# Patient Record
Sex: MALE | Race: WHITE | Employment: OTHER | URBAN - METROPOLITAN AREA
[De-identification: names, ages, dates, MRNs, and addresses within clinical notes are randomized per-mention and may not be internally consistent; named-entity substitution may affect disease eponyms.]

---

## 2017-02-15 ENCOUNTER — OFFICE VISIT (OUTPATIENT)
Dept: GASTROENTEROLOGY | Facility: CLINIC | Age: 67
End: 2017-02-15

## 2017-02-15 VITALS
HEIGHT: 68 IN | DIASTOLIC BLOOD PRESSURE: 75 MMHG | HEART RATE: 86 BPM | BODY MASS INDEX: 24.8 KG/M2 | SYSTOLIC BLOOD PRESSURE: 120 MMHG | WEIGHT: 163.63 LBS

## 2017-02-15 DIAGNOSIS — R10.9 RIGHT FLANK PAIN: Primary | ICD-10-CM

## 2017-02-15 PROCEDURE — G0463 HOSPITAL OUTPT CLINIC VISIT: HCPCS | Performed by: INTERNAL MEDICINE

## 2017-02-15 PROCEDURE — 99203 OFFICE O/P NEW LOW 30 MIN: CPT | Performed by: INTERNAL MEDICINE

## 2017-02-16 ENCOUNTER — OFFICE VISIT (OUTPATIENT)
Dept: INTERNAL MEDICINE CLINIC | Facility: CLINIC | Age: 67
End: 2017-02-16

## 2017-02-16 ENCOUNTER — TELEPHONE (OUTPATIENT)
Dept: INTERNAL MEDICINE CLINIC | Facility: CLINIC | Age: 67
End: 2017-02-16

## 2017-02-16 VITALS
DIASTOLIC BLOOD PRESSURE: 78 MMHG | HEIGHT: 68 IN | BODY MASS INDEX: 24.71 KG/M2 | HEART RATE: 78 BPM | WEIGHT: 163 LBS | SYSTOLIC BLOOD PRESSURE: 122 MMHG | RESPIRATION RATE: 16 BRPM

## 2017-02-16 DIAGNOSIS — R07.2 PRECORDIAL PAIN: Primary | ICD-10-CM

## 2017-02-16 PROCEDURE — G0463 HOSPITAL OUTPT CLINIC VISIT: HCPCS | Performed by: INTERNAL MEDICINE

## 2017-02-16 PROCEDURE — 99214 OFFICE O/P EST MOD 30 MIN: CPT | Performed by: INTERNAL MEDICINE

## 2017-02-16 NOTE — TELEPHONE ENCOUNTER
Patient was seen 2/16/17 - dropped ff copies of his cardiology report at the [facility]  2./16/17 - placed copies in the [facility] SYSTEM OF THE [facility] nurse station in box of  Dr Kristal Campo.

## 2017-02-17 NOTE — PROGRESS NOTES
HPI:    Patient ID: Mark Gamez is a 73year old male. HPI  The patient is seen at the request of Dr. Tina Fregoso.   He describes a 2 year history of right upper quadrant/right flank pain along with some epigastric discomfort that occurs after eating disease. His mother had dementia. 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       Review of Systems  See above    Wt Readings from Last 12 Encounters:  02/16/17 : 163 lb (73.936 kg)  02/15/17 : 163 lb 9.6 oz (74.208 kg)  12/19/16 : 162 lb (73.483 kg)  12/08 CREATININE      0.50-1.50 mg/dL 0.96   CALCIUM      8.5-10.5 mg/dL 10.0   ALT (SGPT)      17-63 U/L 23   AST (SGOT)      15-41 U/L 20   ALKALINE PHOSPHATASE       U/L 61   Total Bilirubin      0.3-1.2 mg/dL 0.7   TOTAL PROTEIN      5.9-8.4 g/dL 6.2 intra- or extrahepatic biliary ductal dilatation. SPLEEN:          No enlargement or focal lesion.     STOMACH:      There is thickening of the distal esophagus, most compatible with hiatal hernia. No gastric obstruction.   PANCREAS:     No lesion, fluid c hiatal hernia. 6. Retroaortic left renal vein. ASSESSMENT/PLAN:   Right flank pain  (primary encounter diagnosis)  The patient has a 2 year history of abdominal pain predominately in the right upper quadrant/right sravan pain.   There is associated

## 2017-02-21 NOTE — PATIENT INSTRUCTIONS
1.  Take MiraLAX on a daily basis to maintain bowel regularity. 2.  Your CT scan showed no calcium in the blood vessels supplying the intestine. 3.  You would normally be due for a colonoscopy in April 2019.   4.  Please contact me if your symptoms contin

## 2017-02-22 NOTE — TELEPHONE ENCOUNTER
Reviewed stress test from [facility] from last year and stress test appears normal. This is good news!  Please notify patient

## 2017-02-27 ENCOUNTER — TELEPHONE (OUTPATIENT)
Dept: GASTROENTEROLOGY | Facility: CLINIC | Age: 67
End: 2017-02-27

## 2017-02-27 NOTE — TELEPHONE ENCOUNTER
Spoke with wife who does not think pt will go to ER for evaluation    She states pain is always worse in the evening. Phoned pt who states he did take Miralax and did help the first couple days.   He states he has been having bowel movements since his r

## 2017-02-27 NOTE — TELEPHONE ENCOUNTER
If the MiraLAX is making the patient's pain worse, he should stop therapy. If his symptoms are severe and he cannot eat he should be evaluated in the emergency room.

## 2017-02-27 NOTE — TELEPHONE ENCOUNTER
Spoke with spouse. Last seen 2/15/17 - wife states abdominal pain is worse since starting on Miralax. Had taken Miralax daily for 5 days and pt thinks worse due to Miralax. Appetite is poor due to pain.   Wife states he tries to eat breakfast but pain is

## 2017-02-28 NOTE — TELEPHONE ENCOUNTER
Dr. Katie De La Fuente,     Pt on the phone arguing that he never received a call back yesterday w/ Dr. Jose Heard recommendations.  I went through each message below with the exact time frames and told him that after speaking to the MD, his wife, Shae Amaral (who is listed as OK t

## 2017-03-01 RX ORDER — POLYETHYLENE GLYCOL 3350, SODIUM CHLORIDE, SODIUM BICARBONATE, POTASSIUM CHLORIDE 420; 11.2; 5.72; 1.48 G/4L; G/4L; G/4L; G/4L
POWDER, FOR SOLUTION ORAL
Qty: 1 BOTTLE | Refills: 0 | Status: SHIPPED | OUTPATIENT
Start: 2017-03-01 | End: 2017-10-04

## 2017-03-01 NOTE — TELEPHONE ENCOUNTER
I spoke to the patient. He has right upper quadrant pain worsened with the MiraLAX along with generalized abdominal pain despite having a bowel movement.   I see no alternative endoscopic evaluation, however, for insurance reasons the patient states that h

## 2017-03-01 NOTE — TELEPHONE ENCOUNTER
Medicare will never give you a guarantee on coverage. Always based on Medical necessity. [facility] is a supplement plan and will say the same thing - they will cover whatever Medicare does and again cannot guarantee any coverage.   So unfortunately I cannot gi

## 2017-03-07 ENCOUNTER — OFFICE VISIT (OUTPATIENT)
Dept: OTOLARYNGOLOGY | Facility: CLINIC | Age: 67
End: 2017-03-07

## 2017-03-07 ENCOUNTER — OFFICE VISIT (OUTPATIENT)
Dept: AUDIOLOGY | Facility: CLINIC | Age: 67
End: 2017-03-07

## 2017-03-07 VITALS
WEIGHT: 163 LBS | DIASTOLIC BLOOD PRESSURE: 80 MMHG | SYSTOLIC BLOOD PRESSURE: 118 MMHG | HEIGHT: 68 IN | BODY MASS INDEX: 24.71 KG/M2

## 2017-03-07 DIAGNOSIS — H93.19 TINNITUS, UNSPECIFIED LATERALITY: Primary | ICD-10-CM

## 2017-03-07 DIAGNOSIS — J34.2 DEVIATED SEPTUM: ICD-10-CM

## 2017-03-07 DIAGNOSIS — H90.3 SENSORINEURAL HEARING LOSS, BILATERAL: Primary | ICD-10-CM

## 2017-03-07 DIAGNOSIS — H61.23 BILATERAL IMPACTED CERUMEN: ICD-10-CM

## 2017-03-07 DIAGNOSIS — H93.19 TINNITUS, UNSPECIFIED LATERALITY: ICD-10-CM

## 2017-03-07 PROCEDURE — 99203 OFFICE O/P NEW LOW 30 MIN: CPT | Performed by: OTOLARYNGOLOGY

## 2017-03-07 PROCEDURE — G0463 HOSPITAL OUTPT CLINIC VISIT: HCPCS | Performed by: OTOLARYNGOLOGY

## 2017-03-07 PROCEDURE — 92567 TYMPANOMETRY: CPT | Performed by: AUDIOLOGIST

## 2017-03-07 PROCEDURE — G0268 REMOVAL OF IMPACTED WAX MD: HCPCS | Performed by: OTOLARYNGOLOGY

## 2017-03-07 PROCEDURE — 92557 COMPREHENSIVE HEARING TEST: CPT | Performed by: AUDIOLOGIST

## 2017-03-07 NOTE — PATIENT INSTRUCTIONS
How Hearing Aids Can Help You  If you’re losing your hearing, it can be frustrating: But, hearing aids can help you hear what Bonnie Chacon been missing. Not everyone who has hearing loss needs hearing aids.  But if your hearing loss is keeping you from communic © 6213-2538 [address], [address]. All rights reserved. This information is not intended as a substitute for professional medical care. Always follow your healthcare professional's instructions.

## 2017-03-07 NOTE — PROGRESS NOTES
AUDIOGRAM     Ramiro Molina was referred for testing by Heather Pagan for decreased hearing in both ears and bilateral tinnitus. 3/5/1950  [id number]    Otoscopic inspection: right ear no cerumen; left ear no cerumen.        Tests/Procedures  Patien

## 2017-03-08 NOTE — PROGRESS NOTES
Ashanti Rojas is a 78year old male. Patient presents with:  Nose Problem: Deviated nasal septum since childhood  Ear Problem: Patient has had tinnitus for 8 years now. MRI of brain done in April 2016.  Last hearing test done in early 2016    HPI:   Has Normal Orientation - Oriented to time, place, person & situation. Appropriate mood and affect. Lymph Detail Normal Submental. Submandibular. Anterior cervical. Posterior cervical. Supraclavicular.    Eyes Normal Conjunctiva - Right: Normal, Left: Normal.

## 2017-03-27 ENCOUNTER — TELEPHONE (OUTPATIENT)
Dept: GASTROENTEROLOGY | Facility: CLINIC | Age: 67
End: 2017-03-27

## 2017-03-27 NOTE — TELEPHONE ENCOUNTER
See 2/27 encounter, where Deonna JHONS spoke to spouse. Spouse came to office, wanting to know if Medicare would cover egd/colon. They also have BCBS supplement.  I explained again that everything is determined by medical necessity after the fact with Medicare

## 2017-04-07 ENCOUNTER — TELEPHONE (OUTPATIENT)
Dept: GASTROENTEROLOGY | Facility: CLINIC | Age: 67
End: 2017-04-07

## 2017-04-07 ENCOUNTER — PATIENT MESSAGE (OUTPATIENT)
Dept: GASTROENTEROLOGY | Facility: CLINIC | Age: 67
End: 2017-04-07

## 2017-04-07 NOTE — TELEPHONE ENCOUNTER
Pt calling back to make sure that EDG and CLN are cancelled for 4/11. Pt. Wants to reschedule to beginning of May. Pt. Is requesting to get a call back today.

## 2017-04-07 NOTE — TELEPHONE ENCOUNTER
I spoke with patient who was very upset and states he does not want to deal with our staff.    He requested to be cancelled for his CLN/EGD scheduled with Dr. Warden Walters on 4/11/17 and for me to send a letter via [address]   confirming that his procedure is cancelled

## 2017-04-07 NOTE — TELEPHONE ENCOUNTER
From: Norman Resendez  To: Christy Ruano MD  Sent: 4/7/2017 1:25 PM CDT  Subject: Other    My name is Edra Service. I need to cancel my colonoscopy and endoscopy which is scheduled for April 11. Today is Friday, April 7.  Please call me today to c

## 2017-04-07 NOTE — TELEPHONE ENCOUNTER
I spoke with this patient today to reschedule his procedure which from the time he answered the phone he was very upset regarding his insurance coverage and that since his insurance is not covering his deductible he wants to cancel the procedure but make t

## 2017-04-24 ENCOUNTER — TELEPHONE (OUTPATIENT)
Dept: GASTROENTEROLOGY | Facility: CLINIC | Age: 67
End: 2017-04-24

## 2017-04-24 NOTE — TELEPHONE ENCOUNTER
Spoke with wife who was very pleasant to talk to and rescheduled colon & EGD. She states he is still having abdominal pain.     Scheduled for:  Colonoscopy & EGD  Provider Name: Geovani Calderon  Date:  06/26/2017  [facility]  [facility] - aware of new location  Kartikat

## 2017-04-24 NOTE — TELEPHONE ENCOUNTER
Also see TE from 4/7/2017 -- Pelon Fitzgerald calling to schedule EGD & CLN. Pls call - requesting to speak to someone else other than the   Thank you.

## 2017-05-12 ENCOUNTER — TELEPHONE (OUTPATIENT)
Dept: INTERNAL MEDICINE CLINIC | Facility: CLINIC | Age: 67
End: 2017-05-12

## 2017-05-12 NOTE — TELEPHONE ENCOUNTER
Patient calling for a new request for refill  Current Outpatient Prescriptions:  Atorvastatin Calcium 20 MG Oral   Tab Take 20 mg by mouth daily.  Disp:  Rfl:      [id number]

## 2017-05-16 ENCOUNTER — TELEPHONE (OUTPATIENT)
Dept: GASTROENTEROLOGY | Facility: CLINIC | Age: 67
End: 2017-05-16

## 2017-05-16 RX ORDER — ATORVASTATIN CALCIUM 20 MG/1
TABLET, FILM COATED ORAL
Qty: 90 TABLET | Refills: 0 | Status: SHIPPED | OUTPATIENT
Start: 2017-05-16 | End: 2017-08-07

## 2017-05-16 NOTE — TELEPHONE ENCOUNTER
Nursing: Reviewed pt chart. No evidence of diabetes, chronic renal/liver disease, or CHF. Okay to substitute Suprep - Rx sent.

## 2017-05-16 NOTE — TELEPHONE ENCOUNTER
Ladonna Floras - Pt's colon is 5.26.17 (date error in previous enc). Wife requesting Suprep instead of the [place]. Pls place order if ok to switch. Pls notify GI RNs, as we can [address] new instructions for Suprep to pt. Thank you!

## 2017-05-26 ENCOUNTER — LAB REQUISITION (OUTPATIENT)
Dept: LAB | Facility: HOSPITAL | Age: 67
End: 2017-05-26
Payer: MEDICARE

## 2017-05-26 DIAGNOSIS — Z01.89 ENCOUNTER FOR OTHER SPECIFIED SPECIAL EXAMINATIONS: ICD-10-CM

## 2017-05-26 PROCEDURE — 88305 TISSUE EXAM BY PATHOLOGIST: CPT | Performed by: INTERNAL MEDICINE

## 2017-05-26 PROCEDURE — 88312 SPECIAL STAINS GROUP 1: CPT | Performed by: INTERNAL MEDICINE

## 2017-06-05 ENCOUNTER — TELEPHONE (OUTPATIENT)
Dept: GASTROENTEROLOGY | Facility: CLINIC | Age: 67
End: 2017-06-05

## 2017-06-05 NOTE — TELEPHONE ENCOUNTER
----- Message from Juan Javed MD sent at 6/2/2017  8:31 PM CDT -----  I spoke to the patient. He had a total of 4 adenomatous polyps removed. The fifth polyp was hyperplastic. I have discussed the significance.   I have also discussed diverticu

## 2017-08-07 ENCOUNTER — OFFICE VISIT (OUTPATIENT)
Dept: INTERNAL MEDICINE CLINIC | Facility: CLINIC | Age: 67
End: 2017-08-07

## 2017-08-07 VITALS
BODY MASS INDEX: 23.64 KG/M2 | HEART RATE: 65 BPM | HEIGHT: 68 IN | RESPIRATION RATE: 16 BRPM | DIASTOLIC BLOOD PRESSURE: 74 MMHG | WEIGHT: 156 LBS | SYSTOLIC BLOOD PRESSURE: 127 MMHG

## 2017-08-07 DIAGNOSIS — Z12.5 SCREENING FOR PROSTATE CANCER: ICD-10-CM

## 2017-08-07 DIAGNOSIS — E78.2 MIXED HYPERLIPIDEMIA: ICD-10-CM

## 2017-08-07 DIAGNOSIS — M79.604 RIGHT LEG PAIN: ICD-10-CM

## 2017-08-07 DIAGNOSIS — R53.82 CHRONIC FATIGUE: Primary | ICD-10-CM

## 2017-08-07 DIAGNOSIS — F41.1 ANXIETY STATE: ICD-10-CM

## 2017-08-07 PROCEDURE — G0463 HOSPITAL OUTPT CLINIC VISIT: HCPCS | Performed by: INTERNAL MEDICINE

## 2017-08-07 PROCEDURE — 99214 OFFICE O/P EST MOD 30 MIN: CPT | Performed by: INTERNAL MEDICINE

## 2017-08-07 RX ORDER — HYDROCODONE BITARTRATE AND ACETAMINOPHEN 5; 325 MG/1; MG/1
1 TABLET ORAL EVERY 6 HOURS PRN
Qty: 5 TABLET | Refills: 0 | Status: SHIPPED | OUTPATIENT
Start: 2017-08-07 | End: 2017-11-06

## 2017-08-07 RX ORDER — ATORVASTATIN CALCIUM 20 MG/1
20 TABLET, FILM COATED ORAL DAILY
Qty: 90 TABLET | Refills: 3 | Status: SHIPPED | OUTPATIENT
Start: 2017-08-07 | End: 2018-08-20

## 2017-08-07 RX ORDER — HYDROCODONE BITARTRATE AND ACETAMINOPHEN 5; 325 MG/1; MG/1
1 TABLET ORAL EVERY 6 HOURS PRN
Qty: 5 TABLET | Refills: 0 | Status: SHIPPED | OUTPATIENT
Start: 2017-08-07 | End: 2017-08-07

## 2017-08-07 NOTE — PROGRESS NOTES
Maurice Wynne is a 78year old male. HPI:   1. Fatigue     Has been having more fatigue latey. Has low energy issues Mother had dementia in [place] and this stressed the patient. Has been sleeping poorly as a result of stress.      2. Mixed Hyperlipide heartburn  NEURO: denies headaches    EXAM:   /74 (BP Location: Right arm, Patient Position: Sitting)   Pulse 65   Resp 16   Ht 5' 8\" (1.727 m)   Wt 156 lb (70.8 kg)   BMI 23.72 kg/m²     GENERAL: well developed, well nourished,in no apparent distre

## 2017-08-09 ENCOUNTER — LAB ENCOUNTER (OUTPATIENT)
Dept: LAB | Facility: HOSPITAL | Age: 67
End: 2017-08-09
Attending: INTERNAL MEDICINE
Payer: MEDICARE

## 2017-08-09 DIAGNOSIS — E78.2 MIXED HYPERLIPIDEMIA: ICD-10-CM

## 2017-08-09 DIAGNOSIS — Z12.5 SCREENING FOR PROSTATE CANCER: ICD-10-CM

## 2017-08-09 DIAGNOSIS — R53.82 CHRONIC FATIGUE: ICD-10-CM

## 2017-08-09 LAB
ALBUMIN SERPL BCP-MCNC: 4.2 G/DL (ref 3.5–4.8)
ALBUMIN/GLOB SERPL: 2 {RATIO} (ref 1–2)
ALP SERPL-CCNC: 50 U/L (ref 32–100)
ALT SERPL-CCNC: 14 U/L (ref 17–63)
ANION GAP SERPL CALC-SCNC: 6 MMOL/L (ref 0–18)
AST SERPL-CCNC: 18 U/L (ref 15–41)
BASOPHILS # BLD: 0.1 K/UL (ref 0–0.2)
BASOPHILS NFR BLD: 1 %
BILIRUB SERPL-MCNC: 0.8 MG/DL (ref 0.3–1.2)
BILIRUB UR QL: NEGATIVE
BUN SERPL-MCNC: 9 MG/DL (ref 8–20)
BUN/CREAT SERPL: 11 (ref 10–20)
CALCIUM SERPL-MCNC: 10.2 MG/DL (ref 8.5–10.5)
CHLORIDE SERPL-SCNC: 107 MMOL/L (ref 95–110)
CHOLEST SERPL-MCNC: 148 MG/DL (ref 110–200)
CLARITY UR: CLEAR
CO2 SERPL-SCNC: 30 MMOL/L (ref 22–32)
COLOR UR: YELLOW
CREAT SERPL-MCNC: 0.82 MG/DL (ref 0.5–1.5)
EOSINOPHIL # BLD: 0.1 K/UL (ref 0–0.7)
EOSINOPHIL NFR BLD: 1 %
ERYTHROCYTE [DISTWIDTH] IN BLOOD BY AUTOMATED COUNT: 13.7 % (ref 11–15)
GLOBULIN PLAS-MCNC: 2.1 G/DL (ref 2.5–3.7)
GLUCOSE SERPL-MCNC: 90 MG/DL (ref 70–99)
GLUCOSE UR-MCNC: NEGATIVE MG/DL
HCT VFR BLD AUTO: 46.1 % (ref 41–52)
HDLC SERPL-MCNC: 50 MG/DL
HGB BLD-MCNC: 15.3 G/DL (ref 13.5–17.5)
HGB UR QL STRIP.AUTO: NEGATIVE
KETONES UR-MCNC: NEGATIVE MG/DL
LDLC SERPL CALC-MCNC: 82 MG/DL (ref 0–99)
LEUKOCYTE ESTERASE UR QL STRIP.AUTO: NEGATIVE
LYMPHOCYTES # BLD: 1.5 K/UL (ref 1–4)
LYMPHOCYTES NFR BLD: 24 %
MCH RBC QN AUTO: 30.2 PG (ref 27–32)
MCHC RBC AUTO-ENTMCNC: 33.1 G/DL (ref 32–37)
MCV RBC AUTO: 91.4 FL (ref 80–100)
MONOCYTES # BLD: 0.4 K/UL (ref 0–1)
MONOCYTES NFR BLD: 6 %
NEUTROPHILS # BLD AUTO: 4.2 K/UL (ref 1.8–7.7)
NEUTROPHILS NFR BLD: 67 %
NITRITE UR QL STRIP.AUTO: NEGATIVE
NONHDLC SERPL-MCNC: 98 MG/DL
OSMOLALITY UR CALC.SUM OF ELEC: 294 MOSM/KG (ref 275–295)
PH UR: 7 [PH] (ref 5–8)
PLATELET # BLD AUTO: 210 K/UL (ref 140–400)
PMV BLD AUTO: 8.3 FL (ref 7.4–10.3)
POTASSIUM SERPL-SCNC: 4.3 MMOL/L (ref 3.3–5.1)
PROT SERPL-MCNC: 6.3 G/DL (ref 5.9–8.4)
PROT UR-MCNC: NEGATIVE MG/DL
PSA SERPL-MCNC: 2.9 NG/ML (ref 0–4)
RBC # BLD AUTO: 5.04 M/UL (ref 4.5–5.9)
SODIUM SERPL-SCNC: 143 MMOL/L (ref 136–144)
SP GR UR STRIP: 1.01 (ref 1–1.03)
TRIGL SERPL-MCNC: 80 MG/DL (ref 1–149)
TSH SERPL-ACNC: 1.15 UIU/ML (ref 0.45–5.33)
UROBILINOGEN UR STRIP-ACNC: <2
VIT C UR-MCNC: NEGATIVE MG/DL
WBC # BLD AUTO: 6.2 K/UL (ref 4–11)

## 2017-08-09 PROCEDURE — 85025 COMPLETE CBC W/AUTO DIFF WBC: CPT

## 2017-08-09 PROCEDURE — 84443 ASSAY THYROID STIM HORMONE: CPT

## 2017-08-09 PROCEDURE — 81003 URINALYSIS AUTO W/O SCOPE: CPT

## 2017-08-09 PROCEDURE — 80061 LIPID PANEL: CPT

## 2017-08-09 PROCEDURE — 36415 COLL VENOUS BLD VENIPUNCTURE: CPT

## 2017-08-09 PROCEDURE — 80053 COMPREHEN METABOLIC PANEL: CPT

## 2017-08-17 ENCOUNTER — TELEPHONE (OUTPATIENT)
Dept: INTERNAL MEDICINE CLINIC | Facility: CLINIC | Age: 67
End: 2017-08-17

## 2017-08-23 NOTE — TELEPHONE ENCOUNTER
Message left that form is completed and ready for  at the [facility] SYSTEM OF THE [employer]

## 2017-09-09 ENCOUNTER — HOSPITAL ENCOUNTER (EMERGENCY)
Facility: HOSPITAL | Age: 67
Discharge: HOME | End: 2017-09-09
Attending: EMERGENCY MEDICINE
Payer: MEDICARE

## 2017-09-09 ENCOUNTER — APPOINTMENT (OUTPATIENT)
Dept: GENERAL RADIOLOGY | Facility: HOSPITAL | Age: 67
End: 2017-09-09
Attending: EMERGENCY MEDICINE
Payer: MEDICARE

## 2017-09-09 VITALS
OXYGEN SATURATION: 98 % | BODY MASS INDEX: 21.81 KG/M2 | TEMPERATURE: 98 F | SYSTOLIC BLOOD PRESSURE: 149 MMHG | RESPIRATION RATE: 10 BRPM | WEIGHT: 143.94 LBS | DIASTOLIC BLOOD PRESSURE: 84 MMHG | HEIGHT: 67.99 IN | HEART RATE: 95 BPM

## 2017-09-09 DIAGNOSIS — R53.1 WEAKNESS GENERALIZED: Primary | ICD-10-CM

## 2017-09-09 LAB
ANION GAP SERPL CALC-SCNC: 7 MMOL/L (ref 0–18)
BASOPHILS # BLD: 0 K/UL (ref 0–0.2)
BASOPHILS NFR BLD: 1 %
BILIRUB UR QL: NEGATIVE
BUN SERPL-MCNC: 7 MG/DL (ref 8–20)
BUN/CREAT SERPL: 7.6 (ref 10–20)
CALCIUM SERPL-MCNC: 9.9 MG/DL (ref 8.5–10.5)
CHLORIDE SERPL-SCNC: 106 MMOL/L (ref 95–110)
CLARITY UR: CLEAR
CO2 SERPL-SCNC: 30 MMOL/L (ref 22–32)
COLOR UR: YELLOW
CREAT SERPL-MCNC: 0.92 MG/DL (ref 0.5–1.5)
EOSINOPHIL # BLD: 0 K/UL (ref 0–0.7)
EOSINOPHIL NFR BLD: 1 %
ERYTHROCYTE [DISTWIDTH] IN BLOOD BY AUTOMATED COUNT: 13.7 % (ref 11–15)
GLUCOSE SERPL-MCNC: 98 MG/DL (ref 70–99)
GLUCOSE UR-MCNC: NEGATIVE MG/DL
HCT VFR BLD AUTO: 45.8 % (ref 41–52)
HGB BLD-MCNC: 15.3 G/DL (ref 13.5–17.5)
HGB UR QL STRIP.AUTO: NEGATIVE
KETONES UR-MCNC: NEGATIVE MG/DL
LEUKOCYTE ESTERASE UR QL STRIP.AUTO: NEGATIVE
LYMPHOCYTES # BLD: 1.4 K/UL (ref 1–4)
LYMPHOCYTES NFR BLD: 20 %
MCH RBC QN AUTO: 30.4 PG (ref 27–32)
MCHC RBC AUTO-ENTMCNC: 33.3 G/DL (ref 32–37)
MCV RBC AUTO: 91.1 FL (ref 80–100)
MONOCYTES # BLD: 0.5 K/UL (ref 0–1)
MONOCYTES NFR BLD: 7 %
NEUTROPHILS # BLD AUTO: 5.3 K/UL (ref 1.8–7.7)
NEUTROPHILS NFR BLD: 72 %
NITRITE UR QL STRIP.AUTO: NEGATIVE
OSMOLALITY UR CALC.SUM OF ELEC: 294 MOSM/KG (ref 275–295)
PH UR: 7 [PH] (ref 5–8)
PLATELET # BLD AUTO: 214 K/UL (ref 140–400)
PMV BLD AUTO: 7.7 FL (ref 7.4–10.3)
POTASSIUM SERPL-SCNC: 4.4 MMOL/L (ref 3.3–5.1)
PROT UR-MCNC: NEGATIVE MG/DL
RBC # BLD AUTO: 5.02 M/UL (ref 4.5–5.9)
SODIUM SERPL-SCNC: 143 MMOL/L (ref 136–144)
SP GR UR STRIP: 1 (ref 1–1.03)
TSH SERPL-ACNC: 2.28 UIU/ML (ref 0.45–5.33)
UROBILINOGEN UR STRIP-ACNC: <2
VIT C UR-MCNC: NEGATIVE MG/DL
WBC # BLD AUTO: 7.3 K/UL (ref 4–11)

## 2017-09-09 PROCEDURE — 80048 BASIC METABOLIC PNL TOTAL CA: CPT | Performed by: EMERGENCY MEDICINE

## 2017-09-09 PROCEDURE — 71020 XR CHEST PA + LAT CHEST (CPT=71020): CPT | Performed by: EMERGENCY MEDICINE

## 2017-09-09 PROCEDURE — 85025 COMPLETE CBC W/AUTO DIFF WBC: CPT | Performed by: EMERGENCY MEDICINE

## 2017-09-09 PROCEDURE — 81003 URINALYSIS AUTO W/O SCOPE: CPT | Performed by: EMERGENCY MEDICINE

## 2017-09-09 PROCEDURE — 96360 HYDRATION IV INFUSION INIT: CPT

## 2017-09-09 PROCEDURE — 84443 ASSAY THYROID STIM HORMONE: CPT | Performed by: EMERGENCY MEDICINE

## 2017-09-09 PROCEDURE — 99284 EMERGENCY DEPT VISIT MOD MDM: CPT
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
## 2017-09-09 NOTE — ED PROVIDER NOTES
Patient Seen in: [facility] AND [facility] Emergency Department    History   Patient presents with:  Fatigue (constitutional, neurologic)    Stated Complaint:     HPI    43-year-old male with history of tinnitus and chronic bilateral lower extremity pain seconda injection  ENT, Mouth: mucous membranes moist  Respiratory: there are no retractions, lungs are clear to auscultation  Cardiovascular: regular rate and rhythm  Gastrointestinal:  abdomen is soft and non tender, no masses, bowel sounds normal  Neurological: treatment of his chronic pain and possible referral to a pain clinic.       Disposition and Plan     Clinical Impression:  Weakness generalized  (primary encounter diagnosis)    Disposition:  Discharge    Follow-up:  Rachele Baumann MD  [address] [place] [address]

## 2017-10-03 ENCOUNTER — TELEPHONE (OUTPATIENT)
Dept: GASTROENTEROLOGY | Facility: CLINIC | Age: 67
End: 2017-10-03

## 2017-10-03 NOTE — TELEPHONE ENCOUNTER
Pain post eating upper abdomen. Denies nausea vomiting but can't eat. Pain lasts for days. TUMS are ineffective. Eating a high fiber diet. Denies fever, chills or body aches. C/o constipation. Stools are dry and hard. Pt is hydrating.   OTC stool so

## 2017-10-03 NOTE — TELEPHONE ENCOUNTER
You could add the patient onto the schedule tomorrow at 2:30 PM.  The patient that is currently scheduled at that time had a procedure today and will not be coming in tomorrow (that patient is aware).

## 2017-10-03 NOTE — TELEPHONE ENCOUNTER
Pelon Fitzgerald notified and accepts appt at 2:30. Procedure pt verified and that appt cancelled, Mr. Mara Rose booked. Verbalized understanding and denies questions.

## 2017-10-03 NOTE — TELEPHONE ENCOUNTER
Pts wife states that pt is having stomach pain when he eats anything and is having problems with bowel movements. She is requesting an appt with GS sooner than first available. Prefers to see GS and not Idalia/APN. Please call.

## 2017-10-04 ENCOUNTER — OFFICE VISIT (OUTPATIENT)
Dept: GASTROENTEROLOGY | Facility: CLINIC | Age: 67
End: 2017-10-04

## 2017-10-04 VITALS
HEART RATE: 82 BPM | BODY MASS INDEX: 23.61 KG/M2 | HEIGHT: 68 IN | DIASTOLIC BLOOD PRESSURE: 74 MMHG | SYSTOLIC BLOOD PRESSURE: 127 MMHG | WEIGHT: 155.81 LBS

## 2017-10-04 DIAGNOSIS — R10.11 RUQ PAIN: Primary | ICD-10-CM

## 2017-10-04 DIAGNOSIS — K59.01 SLOW TRANSIT CONSTIPATION: ICD-10-CM

## 2017-10-04 PROCEDURE — 99213 OFFICE O/P EST LOW 20 MIN: CPT | Performed by: INTERNAL MEDICINE

## 2017-10-04 PROCEDURE — G0463 HOSPITAL OUTPT CLINIC VISIT: HCPCS | Performed by: INTERNAL MEDICINE

## 2017-10-04 RX ORDER — VENLAFAXINE HYDROCHLORIDE 75 MG/1
75 CAPSULE, EXTENDED RELEASE ORAL DAILY
COMMUNITY
Start: 2017-08-03 | End: 2020-02-24

## 2017-10-05 NOTE — PROGRESS NOTES
HPI:    Patient ID: Ashanti Rojas is a 78year old male. HPI  The patient returns in follow-up. He was last seen in endoscopy in May 2017 and in the office in February 2017.     As per previous notes he has had over a 2 year history of right upper q MCG Oral Cap Take 145 mcg by mouth daily. Disp: 30 capsule Rfl: 1   Venlafaxine HCl ER 75 MG Oral Capsule SR 24 Hr Take 75 mg by mouth daily. Disp:  Rfl:    atorvastatin 20 MG Oral Tab Take 1 tablet (20 mg total) by mouth daily.  At Bedtime Disp: 90 tablet Count      140 - 400 K/   MEAN PLATELET VOLUME      7.4 - 10.3 fL 7.7   Neutrophils %      % 72   Lymphocytes %      % 20   Monocytes %      % 7   Eosinophils %      % 1   Basophils %      % 1   Neutrophils Absolute      1.8 - 7.7 K/UL 5.3   Lymphocy There is a 21 x 13 mm (0.4 HU) right adrenal nodule. Contralateral adrenal is unremarkable. KIDNEYS:          29 x 23 mm were density cyst in the midpole cortex of the right kidney. No stones or hydroureteronephrosis.   AORTA/VASCULAR:      There is ca are related to IBS-C. Symptoms do not suggest biliary colic or intestinal angina. Imaging has otherwise been negative as outlined above. We discussed potential treatment options. I believe Briseyda Shahid is a reasonable option.   Although I cannot exclude exac

## 2017-10-05 NOTE — PATIENT INSTRUCTIONS
1. Begin Linzess #1 daily. 2.  Watch for diarrhea. 3.  Please contact me with an update on the medication.

## 2017-11-06 ENCOUNTER — OFFICE VISIT (OUTPATIENT)
Dept: INTERNAL MEDICINE CLINIC | Facility: CLINIC | Age: 67
End: 2017-11-06

## 2017-11-06 VITALS
HEART RATE: 85 BPM | RESPIRATION RATE: 16 BRPM | DIASTOLIC BLOOD PRESSURE: 78 MMHG | BODY MASS INDEX: 22.96 KG/M2 | WEIGHT: 155 LBS | SYSTOLIC BLOOD PRESSURE: 125 MMHG | HEIGHT: 69 IN

## 2017-11-06 DIAGNOSIS — R10.11 RIGHT UPPER QUADRANT ABDOMINAL PAIN: ICD-10-CM

## 2017-11-06 DIAGNOSIS — Z23 NEED FOR VACCINATION: ICD-10-CM

## 2017-11-06 DIAGNOSIS — Z00.00 PHYSICAL EXAM, ANNUAL: Primary | ICD-10-CM

## 2017-11-06 DIAGNOSIS — E78.2 MIXED HYPERLIPIDEMIA: ICD-10-CM

## 2017-11-06 DIAGNOSIS — F41.1 ANXIETY STATE: ICD-10-CM

## 2017-11-06 DIAGNOSIS — M25.561 RECURRENT PAIN OF RIGHT KNEE: ICD-10-CM

## 2017-11-06 DIAGNOSIS — G89.4 CHRONIC PAIN SYNDROME: ICD-10-CM

## 2017-11-06 DIAGNOSIS — H90.3 SENSORINEURAL HEARING LOSS, BILATERAL: ICD-10-CM

## 2017-11-06 DIAGNOSIS — Z96.651 S/P REVISION OF TOTAL KNEE, RIGHT: ICD-10-CM

## 2017-11-06 DIAGNOSIS — H93.13 TINNITUS OF BOTH EARS: ICD-10-CM

## 2017-11-06 PROBLEM — Z98.890 S/P LEFT KNEE SURGERY: Status: ACTIVE | Noted: 2017-11-06

## 2017-11-06 PROCEDURE — 90653 IIV ADJUVANT VACCINE IM: CPT | Performed by: INTERNAL MEDICINE

## 2017-11-06 PROCEDURE — G0439 PPPS, SUBSEQ VISIT: HCPCS | Performed by: INTERNAL MEDICINE

## 2017-11-06 PROCEDURE — G0008 ADMIN INFLUENZA VIRUS VAC: HCPCS | Performed by: INTERNAL MEDICINE

## 2017-11-06 RX ORDER — HYDROCODONE BITARTRATE AND ACETAMINOPHEN 5; 325 MG/1; MG/1
1 TABLET ORAL EVERY 6 HOURS PRN
Qty: 20 TABLET | Refills: 0 | Status: SHIPPED | OUTPATIENT
Start: 2017-11-06 | End: 2018-02-15

## 2017-11-06 NOTE — PROGRESS NOTES
Maurice Wynne is a 78year old male who presents for a Medicare Annual Wellness visit.      Patient Care Team: Patient Care Team:  Trae Garces MD as PCP - General (Internal Medicine)  Bob Tee MD (Internal Medicine)    Patient Activ ALT 23 10/20/2016       Lab Results  Component Value Date   TSH 2.28 09/09/2017   TSH 1.15 08/09/2017   TSH 1.53 10/20/2016       Lab Results  Component Value Date   BUN 7 (L) 09/09/2017   BUN 9 08/09/2017   BUN 9 10/20/2016   CREATSERUM 0.92 09/09/2017 1-Yes    Does pain affect your day to day activities?: 1-Yes     Have you had any memory issues?: 0-No    Fall/Risk Scorin          Depression Screening (PHQ-2/PHQ-9): Over the LAST 2 WEEKS   Little interest or pleasure in doing things (over the last t (mg/dL)   Date Value   09/09/2017 98   ----------    Cardiovascular Disease Screening     LDL Annually LDL Cholesterol (mg/dL)   Date Value   08/09/2017 82        EKG - w/ Initial Preventative Physical Exam only, or if medically necessary     Colorectal Ca (mg/dL)   Date Value   08/09/2017 82    No flowsheet data found. Dilated Eye exam  Annually No flowsheet data found. No flowsheet data found. COPD      Spirometry Testing Annually No results found for this or any previous visit.  No flowsheet data f urinary incontinence  MUSCULOSKELETAL: denies back pain  NEURO: denies headaches  PSYCHE: denies depression or anxiety  HEMATOLOGIC: denies hx of anemia  ENDOCRINE: denies thyroid history  ALL/ASTHMA: denies hx of allergy or asthma    EXAM:   /79 (BP years of age unless a positive family history of colon cancer or polyps necessitates earlier testing. 2. Sensorineural hearing loss, bilateral    Has bilateral hearing loss. Refuses to get hearing aides even though recommended by several ENTs.      3. Ti

## 2018-01-16 ENCOUNTER — TELEPHONE (OUTPATIENT)
Dept: INTERNAL MEDICINE CLINIC | Facility: CLINIC | Age: 68
End: 2018-01-16

## 2018-01-16 DIAGNOSIS — E78.5 HYPERLIPIDEMIA, UNSPECIFIED HYPERLIPIDEMIA TYPE: Primary | ICD-10-CM

## 2018-01-16 NOTE — TELEPHONE ENCOUNTER
Donna Myers from [facility] called in stating pt is coming in on 2/8/2018 for an MRI with them. Donna Myers states she needs pt to have lab work done prior to the MRI. Donna Myers states pt's creatine levels needs to be rechecked.   Donna Myers asking if Dr. Ayesha Maher

## 2018-01-20 ENCOUNTER — APPOINTMENT (OUTPATIENT)
Dept: LAB | Facility: HOSPITAL | Age: 68
End: 2018-01-20
Attending: INTERNAL MEDICINE
Payer: MEDICARE

## 2018-01-20 DIAGNOSIS — E78.5 HYPERLIPIDEMIA, UNSPECIFIED HYPERLIPIDEMIA TYPE: ICD-10-CM

## 2018-01-20 LAB
ANION GAP SERPL CALC-SCNC: 6 MMOL/L (ref 0–18)
BUN SERPL-MCNC: 14 MG/DL (ref 8–20)
BUN/CREAT SERPL: 15.6 (ref 10–20)
CALCIUM SERPL-MCNC: 9.8 MG/DL (ref 8.5–10.5)
CHLORIDE SERPL-SCNC: 105 MMOL/L (ref 95–110)
CO2 SERPL-SCNC: 31 MMOL/L (ref 22–32)
CREAT SERPL-MCNC: 0.9 MG/DL (ref 0.5–1.5)
GLUCOSE SERPL-MCNC: 96 MG/DL (ref 70–99)
OSMOLALITY UR CALC.SUM OF ELEC: 294 MOSM/KG (ref 275–295)
POTASSIUM SERPL-SCNC: 4.3 MMOL/L (ref 3.3–5.1)
SODIUM SERPL-SCNC: 142 MMOL/L (ref 136–144)

## 2018-01-20 PROCEDURE — 80048 BASIC METABOLIC PNL TOTAL CA: CPT

## 2018-01-20 PROCEDURE — 36415 COLL VENOUS BLD VENIPUNCTURE: CPT

## 2018-01-24 ENCOUNTER — TELEPHONE (OUTPATIENT)
Dept: OTHER | Age: 68
End: 2018-01-24

## 2018-01-24 NOTE — TELEPHONE ENCOUNTER
Please advise. Thank you.  Please reply to pool: EM RN TRIAGE    Pt calling (Name and  verified) and states that he reviewed his results on MyChart but has some concerns that even though the results are normal, he notices that his BUN and BUN/Creat ratio

## 2018-01-24 NOTE — TELEPHONE ENCOUNTER
What \" increase is he referring to\" Labs were normal. And if he has had mris in the past with infusion getting it now at [facility] should not be a problem. Who ordered the MRI of brain?

## 2018-01-26 NOTE — TELEPHONE ENCOUNTER
Patient is calling and advised Dr Beth Aguilar's note, states that MRI is ordered by his eye Md.  The lab results that patient is referring  Were the lab tests from previous years [id number], advised that the latest blood test that was done on 1/20/18 were all

## 2018-02-15 NOTE — TELEPHONE ENCOUNTER
Pt states that he is having 2 MRIs next week (brain and eyes). He is requesting a refill of his pain medication as he states that it is very hard for him to sit still due to chronic pain ([facility])/  Please advise on refill request. .   Please respond to p

## 2018-02-16 NOTE — TELEPHONE ENCOUNTER
Pt called back to follow up on his norco refill. Pt has 2 MRI Brain Wed, has right side body pain and doesn't know if able to lay down for to long as well as driving his spouse to her appt/test too.   Requesting only 3 tablets of norco to help him thru his

## 2018-02-19 NOTE — TELEPHONE ENCOUNTER
Pt called back again for update. Advised awaiting MD approval if no response by tomorrow at noon please call again.

## 2018-02-20 RX ORDER — HYDROCODONE BITARTRATE AND ACETAMINOPHEN 5; 325 MG/1; MG/1
1 TABLET ORAL EVERY 6 HOURS PRN
Qty: 3 TABLET | Refills: 0 | Status: SHIPPED | OUTPATIENT
Start: 2018-02-20 | End: 2018-04-03

## 2018-02-20 NOTE — TELEPHONE ENCOUNTER
Dr. Sidney Gannon signed and printed the Rx. Pt advised to  at [facility] today.

## 2018-02-21 ENCOUNTER — HOSPITAL ENCOUNTER (OUTPATIENT)
Dept: MRI IMAGING | Facility: HOSPITAL | Age: 68
Discharge: HOME | End: 2018-02-21
Payer: MEDICARE

## 2018-02-21 DIAGNOSIS — R51.9 HEADACHE: ICD-10-CM

## 2018-02-21 DIAGNOSIS — H57.09: ICD-10-CM

## 2018-02-21 PROCEDURE — 70553 MRI BRAIN STEM W/O & W/DYE: CPT

## 2018-02-21 PROCEDURE — A9575 INJ GADOTERATE MEGLUMI 0.1ML: HCPCS

## 2018-02-21 PROCEDURE — 70553 MRI BRAIN STEM W/O & W/DYE: CPT | Performed by: OPTOMETRIST

## 2018-02-21 PROCEDURE — 70543 MRI ORBT/FAC/NCK W/O &W/DYE: CPT | Performed by: OPTOMETRIST

## 2018-02-21 PROCEDURE — 70543 MRI ORBT/FAC/NCK W/O &W/DYE: CPT

## 2018-04-03 ENCOUNTER — TELEPHONE (OUTPATIENT)
Dept: OTHER | Age: 68
End: 2018-04-03

## 2018-04-03 RX ORDER — HYDROCODONE BITARTRATE AND ACETAMINOPHEN 5; 325 MG/1; MG/1
1 TABLET ORAL EVERY 6 HOURS PRN
Qty: 20 TABLET | Refills: 0 | OUTPATIENT
Start: 2018-04-03 | End: 2018-04-04

## 2018-04-03 NOTE — TELEPHONE ENCOUNTER
Patient calling and requesting refill for [facility], seen PCP last 11/6/17, states that his BLE painful right now , and his wife is undergoing angiogram, he needs to do everything at home to take care of the wife and will need pain meds to move around, advised

## 2018-04-04 RX ORDER — HYDROCODONE BITARTRATE AND ACETAMINOPHEN 5; 325 MG/1; MG/1
1 TABLET ORAL EVERY 6 HOURS PRN
Qty: 20 TABLET | Refills: 0 | Status: SHIPPED | OUTPATIENT
Start: 2018-04-04 | End: 2018-05-17

## 2018-05-08 ENCOUNTER — PATIENT MESSAGE (OUTPATIENT)
Dept: INTERNAL MEDICINE CLINIC | Facility: CLINIC | Age: 68
End: 2018-05-08

## 2018-05-08 NOTE — TELEPHONE ENCOUNTER
From: Ayaka Peck  To: Vikki Lemus MD  Sent: 5/8/2018 3:06 PM CDT  Subject: Non-Urgent Medical Question    I have a skin tag on my left eyelid that is becoming increasingly large.  Can I come to you for this or should I go to a dermatologist?

## 2018-05-17 ENCOUNTER — OFFICE VISIT (OUTPATIENT)
Dept: INTERNAL MEDICINE CLINIC | Facility: CLINIC | Age: 68
End: 2018-05-17

## 2018-05-17 VITALS
SYSTOLIC BLOOD PRESSURE: 126 MMHG | RESPIRATION RATE: 16 BRPM | DIASTOLIC BLOOD PRESSURE: 78 MMHG | BODY MASS INDEX: 23.11 KG/M2 | HEIGHT: 69 IN | HEART RATE: 63 BPM | WEIGHT: 156 LBS

## 2018-05-17 DIAGNOSIS — E78.2 MIXED HYPERLIPIDEMIA: ICD-10-CM

## 2018-05-17 DIAGNOSIS — F41.1 ANXIETY STATE: ICD-10-CM

## 2018-05-17 DIAGNOSIS — R53.82 CHRONIC FATIGUE: Primary | ICD-10-CM

## 2018-05-17 DIAGNOSIS — M79.604 RIGHT LEG PAIN: ICD-10-CM

## 2018-05-17 PROCEDURE — G0463 HOSPITAL OUTPT CLINIC VISIT: HCPCS | Performed by: INTERNAL MEDICINE

## 2018-05-17 PROCEDURE — 99214 OFFICE O/P EST MOD 30 MIN: CPT | Performed by: INTERNAL MEDICINE

## 2018-05-17 RX ORDER — HYDROCODONE BITARTRATE AND ACETAMINOPHEN 5; 325 MG/1; MG/1
1 TABLET ORAL EVERY 6 HOURS PRN
Qty: 20 TABLET | Refills: 0 | Status: SHIPPED | OUTPATIENT
Start: 2018-05-17 | End: 2018-08-20

## 2018-05-17 NOTE — PROGRESS NOTES
Mc Tamez is a 80year old male. HPI:   1. Fatigue     Has been having more fatigue latey. Has low energy issues  Wife has been ill with uindefined illness. She has been unable to do housework. Has been sleeping poorly as a result of stress.      2 HEALTH: feels well otherwise  SKIN: denies any unusual skin lesions or rashes  RESPIRATORY: denies shortness of breath with exertion  CARDIOVASCULAR: denies chest pain on exertion  GI: denies abdominal pain and denies heartburn  NEURO: denies headaches of these issues and agrees to the plan. The patient is asked to return as needed.

## 2018-08-02 ENCOUNTER — OFFICE VISIT (OUTPATIENT)
Dept: OTOLARYNGOLOGY | Facility: CLINIC | Age: 68
End: 2018-08-02
Payer: MEDICARE

## 2018-08-02 VITALS
TEMPERATURE: 99 F | BODY MASS INDEX: 23.34 KG/M2 | DIASTOLIC BLOOD PRESSURE: 80 MMHG | SYSTOLIC BLOOD PRESSURE: 122 MMHG | WEIGHT: 154 LBS | HEIGHT: 68 IN

## 2018-08-02 DIAGNOSIS — M26.69 TMJ INFLAMMATION: Primary | ICD-10-CM

## 2018-08-02 DIAGNOSIS — H93.13 TINNITUS OF BOTH EARS: ICD-10-CM

## 2018-08-02 PROCEDURE — G0463 HOSPITAL OUTPT CLINIC VISIT: HCPCS | Performed by: OTOLARYNGOLOGY

## 2018-08-02 PROCEDURE — 99214 OFFICE O/P EST MOD 30 MIN: CPT | Performed by: OTOLARYNGOLOGY

## 2018-08-03 NOTE — PROGRESS NOTES
Bryn Mac is a 80year old male.   Patient presents with:  Ringing In Ear: in both ears, causing headaches every night, pain in both ears, trouble hearing out of both ears  Jaw Pain: pain on right and left side      HISTORY OF PRESENT ILLNESS  She h Grandmother    • Other Haley Mulling Father      parkinson disease   • Dementia Mother    • Heart Attack Sister        Past Medical History:   Diagnosis Date   • Colon polyp 05/2017   • Esophageal reflux    • Tinnitus        Past Surgical History:  05/2017: Cheryl Chin Ears Normal Inspection - Right: Normal, Left: Normal. Canal - Right: Normal, Left: Normal. TM - Right: Normal, Left: Normal.   Skin Normal Inspection - Normal.        Lymph Detail Normal Submental. Submandibular.  Anterior cervical. Posterior cervical. Hernandez return to see me in about a month to see if things have improved. Dorina Rust.  Naun Bunch MD    8/3/2018    5:46 AM

## 2018-08-20 ENCOUNTER — OFFICE VISIT (OUTPATIENT)
Dept: INTERNAL MEDICINE CLINIC | Facility: CLINIC | Age: 68
End: 2018-08-20
Payer: MEDICARE

## 2018-08-20 VITALS
WEIGHT: 159 LBS | HEIGHT: 68 IN | RESPIRATION RATE: 16 BRPM | DIASTOLIC BLOOD PRESSURE: 78 MMHG | SYSTOLIC BLOOD PRESSURE: 121 MMHG | HEART RATE: 75 BPM | BODY MASS INDEX: 24.1 KG/M2

## 2018-08-20 DIAGNOSIS — R53.82 CHRONIC FATIGUE: Primary | ICD-10-CM

## 2018-08-20 DIAGNOSIS — F41.1 ANXIETY STATE: ICD-10-CM

## 2018-08-20 DIAGNOSIS — R51.9 HEADACHE DISORDER: ICD-10-CM

## 2018-08-20 DIAGNOSIS — E78.2 MIXED HYPERLIPIDEMIA: ICD-10-CM

## 2018-08-20 PROCEDURE — 99214 OFFICE O/P EST MOD 30 MIN: CPT | Performed by: INTERNAL MEDICINE

## 2018-08-20 PROCEDURE — G0463 HOSPITAL OUTPT CLINIC VISIT: HCPCS | Performed by: INTERNAL MEDICINE

## 2018-08-20 RX ORDER — HYDROCODONE BITARTRATE AND ACETAMINOPHEN 5; 325 MG/1; MG/1
1 TABLET ORAL EVERY 6 HOURS PRN
Qty: 20 TABLET | Refills: 0 | Status: SHIPPED | OUTPATIENT
Start: 2018-08-20 | End: 2018-11-19

## 2018-08-20 RX ORDER — ATORVASTATIN CALCIUM 20 MG/1
20 TABLET, FILM COATED ORAL DAILY
Qty: 90 TABLET | Refills: 3 | Status: SHIPPED | OUTPATIENT
Start: 2018-08-20 | End: 2019-05-23

## 2018-08-20 NOTE — PROGRESS NOTES
Norman Resendez is a 80year old male. HPI:   1. Fatigue     Has been having more fatigue latey. Has low energy issues  Wife has been ill with uindefined illness. She has been unable to do housework. Has been sleeping poorly as a result of stress.      2 heartburn  NEURO: denies headaches    EXAM:   /78   Pulse 75   Resp 16   Ht 5' 8\" (1.727 m)   Wt 159 lb (72.1 kg)   BMI 24.18 kg/m²     GENERAL: well developed, well nourished,in no apparent distress  SKIN: no rashes,no suspicious lesions  HEENT: at

## 2018-08-21 ENCOUNTER — LAB ENCOUNTER (OUTPATIENT)
Dept: LAB | Facility: HOSPITAL | Age: 68
End: 2018-08-21
Attending: INTERNAL MEDICINE
Payer: MEDICARE

## 2018-08-21 DIAGNOSIS — R53.82 CHRONIC FATIGUE: ICD-10-CM

## 2018-08-21 DIAGNOSIS — E78.2 MIXED HYPERLIPIDEMIA: ICD-10-CM

## 2018-08-21 LAB
ALBUMIN SERPL BCP-MCNC: 3.9 G/DL (ref 3.5–4.8)
ALBUMIN/GLOB SERPL: 1.8 {RATIO} (ref 1–2)
ALP SERPL-CCNC: 56 U/L (ref 32–100)
ALT SERPL-CCNC: 19 U/L (ref 17–63)
ANION GAP SERPL CALC-SCNC: 5 MMOL/L (ref 0–18)
AST SERPL-CCNC: 21 U/L (ref 15–41)
BASOPHILS # BLD: 0.1 K/UL (ref 0–0.2)
BASOPHILS NFR BLD: 1 %
BILIRUB SERPL-MCNC: 0.8 MG/DL (ref 0.3–1.2)
BILIRUB UR QL: NEGATIVE
BUN SERPL-MCNC: 10 MG/DL (ref 8–20)
BUN/CREAT SERPL: 10.4 (ref 10–20)
CALCIUM SERPL-MCNC: 9.9 MG/DL (ref 8.5–10.5)
CHLORIDE SERPL-SCNC: 106 MMOL/L (ref 95–110)
CHOLEST SERPL-MCNC: 150 MG/DL (ref 110–200)
CLARITY UR: CLEAR
CO2 SERPL-SCNC: 31 MMOL/L (ref 22–32)
COLOR UR: YELLOW
CREAT SERPL-MCNC: 0.96 MG/DL (ref 0.5–1.5)
EOSINOPHIL # BLD: 0.1 K/UL (ref 0–0.7)
EOSINOPHIL NFR BLD: 2 %
ERYTHROCYTE [DISTWIDTH] IN BLOOD BY AUTOMATED COUNT: 13.6 % (ref 11–15)
GLOBULIN PLAS-MCNC: 2.2 G/DL (ref 2.5–3.7)
GLUCOSE SERPL-MCNC: 90 MG/DL (ref 70–99)
GLUCOSE UR-MCNC: NEGATIVE MG/DL
HCT VFR BLD AUTO: 44.3 % (ref 41–52)
HDLC SERPL-MCNC: 50 MG/DL
HGB BLD-MCNC: 15 G/DL (ref 13.5–17.5)
HGB UR QL STRIP.AUTO: NEGATIVE
KETONES UR-MCNC: NEGATIVE MG/DL
LDLC SERPL CALC-MCNC: 82 MG/DL (ref 0–99)
LYMPHOCYTES # BLD: 2.1 K/UL (ref 1–4)
LYMPHOCYTES NFR BLD: 30 %
MCH RBC QN AUTO: 30.8 PG (ref 27–32)
MCHC RBC AUTO-ENTMCNC: 33.9 G/DL (ref 32–37)
MCV RBC AUTO: 90.7 FL (ref 80–100)
MONOCYTES # BLD: 0.5 K/UL (ref 0–1)
MONOCYTES NFR BLD: 8 %
NEUTROPHILS # BLD AUTO: 4.1 K/UL (ref 1.8–7.7)
NEUTROPHILS NFR BLD: 60 %
NITRITE UR QL STRIP.AUTO: NEGATIVE
NONHDLC SERPL-MCNC: 100 MG/DL
OSMOLALITY UR CALC.SUM OF ELEC: 293 MOSM/KG (ref 275–295)
PATIENT FASTING: YES
PH UR: 6 [PH] (ref 5–8)
PLATELET # BLD AUTO: 235 K/UL (ref 140–400)
PMV BLD AUTO: 7.9 FL (ref 7.4–10.3)
POTASSIUM SERPL-SCNC: 4.4 MMOL/L (ref 3.3–5.1)
PROT SERPL-MCNC: 6.1 G/DL (ref 5.9–8.4)
PROT UR-MCNC: NEGATIVE MG/DL
RBC # BLD AUTO: 4.88 M/UL (ref 4.5–5.9)
RBC #/AREA URNS AUTO: 2 /HPF
SODIUM SERPL-SCNC: 142 MMOL/L (ref 136–144)
SP GR UR STRIP: 1.02 (ref 1–1.03)
TRIGL SERPL-MCNC: 91 MG/DL (ref 1–149)
TSH SERPL-ACNC: 3.61 UIU/ML (ref 0.45–5.33)
UROBILINOGEN UR STRIP-ACNC: <2
VIT C UR-MCNC: NEGATIVE MG/DL
WBC # BLD AUTO: 6.9 K/UL (ref 4–11)
WBC #/AREA URNS AUTO: 9 /HPF

## 2018-08-21 PROCEDURE — 81001 URINALYSIS AUTO W/SCOPE: CPT

## 2018-08-21 PROCEDURE — 85025 COMPLETE CBC W/AUTO DIFF WBC: CPT

## 2018-08-21 PROCEDURE — 84443 ASSAY THYROID STIM HORMONE: CPT

## 2018-08-21 PROCEDURE — 36415 COLL VENOUS BLD VENIPUNCTURE: CPT

## 2018-08-21 PROCEDURE — 80061 LIPID PANEL: CPT

## 2018-08-21 PROCEDURE — 80053 COMPREHEN METABOLIC PANEL: CPT

## 2018-08-27 ENCOUNTER — TELEPHONE (OUTPATIENT)
Dept: INTERNAL MEDICINE CLINIC | Facility: CLINIC | Age: 68
End: 2018-08-27

## 2018-08-27 NOTE — TELEPHONE ENCOUNTER
Pt's spouse called in requesting to have a letter generated stating that pt is disabled in order for pt to receive a tax credit for his home. Pt's spouse is requesting to have the letter sent through [address] Please advise when the letter is ready.

## 2018-09-04 ENCOUNTER — TELEPHONE (OUTPATIENT)
Dept: OTHER | Age: 68
End: 2018-09-04

## 2018-09-04 NOTE — TELEPHONE ENCOUNTER
Dr Serge Aguilar=please see message below and also Greetzt message sent last 8/30/18 regarding the urine test,with question about the results.       Patient calling and states that he viewed the UA results and read the message of Dr Ashley Oswald sent POST [facility]

## 2018-09-12 NOTE — TELEPHONE ENCOUNTER
Pt's wife called in to check status of note. She states that Pt has been disabled/not able to work since 2008. She states that this will need to be completed and submitted by 10/1 for her property taxes. Please call back once completed.    She states

## 2018-09-18 ENCOUNTER — OFFICE VISIT (OUTPATIENT)
Dept: SURGERY | Facility: CLINIC | Age: 68
End: 2018-09-18
Payer: MEDICARE

## 2018-09-18 VITALS
WEIGHT: 159 LBS | SYSTOLIC BLOOD PRESSURE: 142 MMHG | HEIGHT: 69 IN | BODY MASS INDEX: 23.55 KG/M2 | DIASTOLIC BLOOD PRESSURE: 70 MMHG

## 2018-09-18 DIAGNOSIS — R39.12 WEAK URINARY STREAM: ICD-10-CM

## 2018-09-18 DIAGNOSIS — R39.198 DIFFICULTY IN URINATION: ICD-10-CM

## 2018-09-18 DIAGNOSIS — R35.0 FREQUENCY OF URINATION: Primary | ICD-10-CM

## 2018-09-18 DIAGNOSIS — Z12.5 PROSTATE CANCER SCREENING: ICD-10-CM

## 2018-09-18 DIAGNOSIS — R39.15 URGENCY OF URINATION: ICD-10-CM

## 2018-09-18 DIAGNOSIS — N40.1 BPH ASSOCIATED WITH NOCTURIA: ICD-10-CM

## 2018-09-18 DIAGNOSIS — R35.1 BPH ASSOCIATED WITH NOCTURIA: ICD-10-CM

## 2018-09-18 PROCEDURE — G0463 HOSPITAL OUTPT CLINIC VISIT: HCPCS | Performed by: NURSE PRACTITIONER

## 2018-09-18 PROCEDURE — 99203 OFFICE O/P NEW LOW 30 MIN: CPT | Performed by: NURSE PRACTITIONER

## 2018-09-18 NOTE — PROGRESS NOTES
HPI:    Patient ID: Ashanti Rojas is a 80year old male. Patient is a 80year old male who presents to the clinic with a chief complaint of possible UTI.   Patient states he recently had a UA done and he was concerned it was abnormal and could indica daily. At Bedtime Disp: 90 tablet Rfl: 3   HYDROcodone-acetaminophen (NORCO) 5-325 MG Oral Tab Take 1 tablet by mouth every 6 (six) hours as needed for Pain. Disp: 20 tablet Rfl: 0   Venlafaxine HCl ER 75 MG Oral Capsule SR 24 Hr Take 75 mg by mouth daily. Skin is warm and dry. Psychiatric: He has a normal mood and affect.             ASSESSMENT/PLAN:   Frequency of urination  (primary encounter diagnosis)  Prostate cancer screening  Difficulty in urination  Weak urinary stream  Urgency of urination  Bph as

## 2018-09-22 ENCOUNTER — APPOINTMENT (OUTPATIENT)
Dept: LAB | Facility: HOSPITAL | Age: 68
End: 2018-09-22
Attending: INTERNAL MEDICINE
Payer: MEDICARE

## 2018-09-22 DIAGNOSIS — R35.0 FREQUENCY OF URINATION: ICD-10-CM

## 2018-09-22 DIAGNOSIS — Z12.5 PROSTATE CANCER SCREENING: ICD-10-CM

## 2018-09-22 LAB
BILIRUB UR QL: NEGATIVE
CLARITY UR: CLEAR
COLOR UR: YELLOW
GLUCOSE UR-MCNC: NEGATIVE MG/DL
HGB UR QL STRIP.AUTO: NEGATIVE
KETONES UR-MCNC: NEGATIVE MG/DL
LEUKOCYTE ESTERASE UR QL STRIP.AUTO: NEGATIVE
NITRITE UR QL STRIP.AUTO: NEGATIVE
PH UR: 6 [PH] (ref 5–8)
PROT UR-MCNC: NEGATIVE MG/DL
PSA SERPL-MCNC: 2.3 NG/ML (ref 0–4)
SP GR UR STRIP: 1.02 (ref 1–1.03)
UROBILINOGEN UR STRIP-ACNC: <2
VIT C UR-MCNC: NEGATIVE MG/DL

## 2018-09-22 PROCEDURE — 36415 COLL VENOUS BLD VENIPUNCTURE: CPT

## 2018-09-22 PROCEDURE — 81003 URINALYSIS AUTO W/O SCOPE: CPT | Performed by: NURSE PRACTITIONER

## 2018-11-19 ENCOUNTER — OFFICE VISIT (OUTPATIENT)
Dept: INTERNAL MEDICINE CLINIC | Facility: CLINIC | Age: 68
End: 2018-11-19
Payer: MEDICARE

## 2018-11-19 VITALS
RESPIRATION RATE: 16 BRPM | HEIGHT: 69 IN | WEIGHT: 160 LBS | HEART RATE: 85 BPM | SYSTOLIC BLOOD PRESSURE: 133 MMHG | DIASTOLIC BLOOD PRESSURE: 80 MMHG | BODY MASS INDEX: 23.7 KG/M2

## 2018-11-19 DIAGNOSIS — H93.13 TINNITUS OF BOTH EARS: Primary | ICD-10-CM

## 2018-11-19 DIAGNOSIS — E78.2 MIXED HYPERLIPIDEMIA: ICD-10-CM

## 2018-11-19 DIAGNOSIS — Z23 NEED FOR VACCINATION: ICD-10-CM

## 2018-11-19 DIAGNOSIS — R51.9 HEADACHE DISORDER: ICD-10-CM

## 2018-11-19 PROCEDURE — 90670 PCV13 VACCINE IM: CPT | Performed by: INTERNAL MEDICINE

## 2018-11-19 PROCEDURE — G0009 ADMIN PNEUMOCOCCAL VACCINE: HCPCS | Performed by: INTERNAL MEDICINE

## 2018-11-19 PROCEDURE — G0463 HOSPITAL OUTPT CLINIC VISIT: HCPCS | Performed by: INTERNAL MEDICINE

## 2018-11-19 PROCEDURE — 99214 OFFICE O/P EST MOD 30 MIN: CPT | Performed by: INTERNAL MEDICINE

## 2018-11-19 RX ORDER — HYDROCODONE BITARTRATE AND ACETAMINOPHEN 5; 325 MG/1; MG/1
1 TABLET ORAL EVERY 6 HOURS PRN
Qty: 20 TABLET | Refills: 0 | Status: SHIPPED | OUTPATIENT
Start: 2018-11-19 | End: 2019-05-23

## 2018-11-19 NOTE — PROGRESS NOTES
Toñito Pimentel is a 80year old male. HPI:   1. Tinnitus of both ears    Has been having tinnitus for years and it seems to be getting worse. Is very bothersome. Thinks his hearing is going as well. 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  A developed, well nourished,in no apparent distress  SKIN: no rashes,no suspicious lesions  HEENT: atraumatic, normocephalic,ears and throat are clear  NECK: supple,no adenopathy,no bruits  LUNGS: clear to auscultation  CARDIO: RRR without murmur  GI: good B

## 2018-11-28 ENCOUNTER — HOSPITAL ENCOUNTER (EMERGENCY)
Facility: HOSPITAL | Age: 68
Discharge: HOME | End: 2018-11-28
Attending: EMERGENCY MEDICINE
Payer: MEDICARE

## 2018-11-28 ENCOUNTER — APPOINTMENT (OUTPATIENT)
Dept: GENERAL RADIOLOGY | Facility: HOSPITAL | Age: 68
End: 2018-11-28
Attending: EMERGENCY MEDICINE
Payer: MEDICARE

## 2018-11-28 ENCOUNTER — APPOINTMENT (OUTPATIENT)
Dept: CT IMAGING | Facility: HOSPITAL | Age: 68
End: 2018-11-28
Attending: EMERGENCY MEDICINE
Payer: MEDICARE

## 2018-11-28 VITALS
BODY MASS INDEX: 23.7 KG/M2 | HEIGHT: 69 IN | DIASTOLIC BLOOD PRESSURE: 87 MMHG | TEMPERATURE: 98 F | OXYGEN SATURATION: 97 % | HEART RATE: 105 BPM | RESPIRATION RATE: 20 BRPM | WEIGHT: 160 LBS | SYSTOLIC BLOOD PRESSURE: 140 MMHG

## 2018-11-28 DIAGNOSIS — G44.209 TENSION HEADACHE: Primary | ICD-10-CM

## 2018-11-28 DIAGNOSIS — M25.561 ACUTE PAIN OF RIGHT KNEE: ICD-10-CM

## 2018-11-28 PROCEDURE — 73560 X-RAY EXAM OF KNEE 1 OR 2: CPT | Performed by: EMERGENCY MEDICINE

## 2018-11-28 PROCEDURE — 80048 BASIC METABOLIC PNL TOTAL CA: CPT | Performed by: EMERGENCY MEDICINE

## 2018-11-28 PROCEDURE — 36415 COLL VENOUS BLD VENIPUNCTURE: CPT

## 2018-11-28 PROCEDURE — 70450 CT HEAD/BRAIN W/O DYE: CPT | Performed by: EMERGENCY MEDICINE

## 2018-11-28 PROCEDURE — 99284 EMERGENCY DEPT VISIT MOD MDM: CPT

## 2018-11-28 PROCEDURE — 85025 COMPLETE CBC W/AUTO DIFF WBC: CPT | Performed by: EMERGENCY MEDICINE

## 2018-11-28 NOTE — ED INITIAL ASSESSMENT (HPI)
Pt to ER with c/o \"very bad\" headache for last \"16 hours. Pt states hx of tinnitus. Pt denies cough or fever. Pt denies new visual changes. Pt is alert and oriented x4.  Pt states he took a Norco at home today at 1100 am.  Pt would also like to see MD damon

## 2018-11-28 NOTE — ED PROVIDER NOTES
Patient Seen in: [facility] AND [facility] Emergency Department    History   Patient presents with:  Headache (neurologic)  Pain (neurologic)    Stated Complaint: headache for 16 hours    HPI    58-year-old male with history of GERD and tinnitus with associated Negative for back pain. Skin: Negative for rash. Neurological: Positive for headaches. Negative for weakness and light-headedness. All other systems reviewed and are negative.       Positive for stated complaint: headache for 16 hours  Other systems a Oximeter:  Pulse oximetry on room air is 97%, indicating adequate oxygenation.     PROCEDURES:  none    DIAGNOSTICS:   Labs:  Recent Results (from the past 24 hour(s))   BASIC METABOLIC PANEL (8)    Collection Time: 11/28/18  1:24 PM   Result Value Ref Jesusita Diaz Approved by (CST): Catia Willams MD on 11/28/2018 at [address] ([id number])    Result Date: 11/28/2018  CONCLUSION:  1. No acute intracranial process by noncontrast CT technique.  2. Left anterior frontal lobe encephalomalacia, which lik Sensorineural hearing loss, bilateral; Physical exam, annual; S/P revision of total knee, right; Right leg pain; and Chronic fatigue on their problem list. to contribute to the complexity of his ED evaluation.     - pt  comfortable with d/c at this time, wi

## 2019-02-21 ENCOUNTER — OFFICE VISIT (OUTPATIENT)
Dept: INTERNAL MEDICINE CLINIC | Facility: CLINIC | Age: 69
End: 2019-02-21
Payer: MEDICARE

## 2019-02-21 VITALS
RESPIRATION RATE: 16 BRPM | HEIGHT: 69 IN | HEART RATE: 96 BPM | SYSTOLIC BLOOD PRESSURE: 144 MMHG | WEIGHT: 164 LBS | BODY MASS INDEX: 24.29 KG/M2 | DIASTOLIC BLOOD PRESSURE: 93 MMHG

## 2019-02-21 DIAGNOSIS — Z00.00 PHYSICAL EXAM, ANNUAL: Primary | ICD-10-CM

## 2019-02-21 DIAGNOSIS — E78.2 MIXED HYPERLIPIDEMIA: ICD-10-CM

## 2019-02-21 DIAGNOSIS — Z12.5 SCREENING FOR PROSTATE CANCER: ICD-10-CM

## 2019-02-21 PROBLEM — R51.9 HEADACHE DISORDER: Status: ACTIVE | Noted: 2019-02-21

## 2019-02-21 PROBLEM — H93.13 TINNITUS OF BOTH EARS: Status: ACTIVE | Noted: 2019-02-21

## 2019-02-21 PROCEDURE — G0439 PPPS, SUBSEQ VISIT: HCPCS | Performed by: INTERNAL MEDICINE

## 2019-02-21 RX ORDER — HYDROCODONE BITARTRATE AND ACETAMINOPHEN 5; 325 MG/1; MG/1
1 TABLET ORAL EVERY 8 HOURS PRN
Qty: 20 TABLET | Refills: 0 | Status: SHIPPED | OUTPATIENT
Start: 2019-02-21 | End: 2019-05-23

## 2019-03-06 ENCOUNTER — TELEPHONE (OUTPATIENT)
Dept: INTERNAL MEDICINE CLINIC | Facility: CLINIC | Age: 69
End: 2019-03-06

## 2019-03-06 NOTE — TELEPHONE ENCOUNTER
Pt states he had a pneumonia vaccine administered last year and believes needs a second one.  Informed pt per vaccine record (unless had vaccine elsewhere that we do not have on file), he had the Prevnar 13 on 11/19/18 so is due for the Pneumococcal PPSV23

## 2019-03-27 ENCOUNTER — TELEPHONE (OUTPATIENT)
Dept: OTHER | Age: 69
End: 2019-03-27

## 2019-03-27 NOTE — TELEPHONE ENCOUNTER
Pt states he received a bill from Medicare for a wellness visit on 2/21/18 that was free but was not coded correctly.  Pt states the visit was scheduled for a wellness physical. Pt is requesting that Dr. Adina Nolen code the visit a medicare wellness physical

## 2019-03-28 PROBLEM — Z12.5 SCREENING FOR PROSTATE CANCER: Status: ACTIVE | Noted: 2019-03-28

## 2019-03-29 NOTE — TELEPHONE ENCOUNTER
I redid visit.  Please forward new visit and code to billing department and I sent pt message on my chart

## 2019-04-04 ENCOUNTER — OFFICE VISIT (OUTPATIENT)
Dept: OTOLARYNGOLOGY | Facility: CLINIC | Age: 69
End: 2019-04-04
Payer: MEDICARE

## 2019-04-04 VITALS
BODY MASS INDEX: 24.29 KG/M2 | WEIGHT: 164 LBS | DIASTOLIC BLOOD PRESSURE: 70 MMHG | HEIGHT: 69 IN | TEMPERATURE: 98 F | SYSTOLIC BLOOD PRESSURE: 120 MMHG

## 2019-04-04 DIAGNOSIS — H93.13 TINNITUS OF BOTH EARS: Primary | ICD-10-CM

## 2019-04-04 DIAGNOSIS — H61.23 BILATERAL IMPACTED CERUMEN: ICD-10-CM

## 2019-04-04 PROCEDURE — 69210 REMOVE IMPACTED EAR WAX UNI: CPT | Performed by: OTOLARYNGOLOGY

## 2019-04-04 NOTE — PROGRESS NOTES
Andrea Vazquez is a 77year old male.   Patient presents with:  Cerumen Impaction: Bilateral ear wax       HISTORY OF PRESENT ILLNESS    Patient presents for cerumen removal. No other complaints or concerns at this time    Social History    Socioeconomic (36.6 °C) (Tympanic)   Ht 5' 9\" (1.753 m)   Wt 164 lb (74.4 kg)   BMI 24.22 kg/m²        Constitutional Normal Overall appearance - Normal.        Neck Exam Normal Inspection - Normal. Palpation - Normal. Parotid gland - Normal. Thyroid gland - Normal. REQUIRING INSTRUMENTATION, UNILATERAL      All cerumen was removed using microscopy. I have asked the patient to return to see me as needed for repeat cerumen removal in the future. Kya Lopez.  John De Luna MD    4/4/2019    3:42 PM

## 2019-04-12 ENCOUNTER — LAB ENCOUNTER (OUTPATIENT)
Dept: LAB | Facility: HOSPITAL | Age: 69
End: 2019-04-12
Attending: INTERNAL MEDICINE
Payer: MEDICARE

## 2019-04-12 DIAGNOSIS — Z12.5 SCREENING FOR PROSTATE CANCER: ICD-10-CM

## 2019-04-12 DIAGNOSIS — E78.2 MIXED HYPERLIPIDEMIA: ICD-10-CM

## 2019-04-12 PROCEDURE — 36415 COLL VENOUS BLD VENIPUNCTURE: CPT

## 2019-04-12 PROCEDURE — 81003 URINALYSIS AUTO W/O SCOPE: CPT

## 2019-04-12 PROCEDURE — 80053 COMPREHEN METABOLIC PANEL: CPT

## 2019-04-12 PROCEDURE — 85025 COMPLETE CBC W/AUTO DIFF WBC: CPT

## 2019-04-12 PROCEDURE — 80061 LIPID PANEL: CPT

## 2019-04-12 PROCEDURE — 84443 ASSAY THYROID STIM HORMONE: CPT

## 2019-05-06 ENCOUNTER — PATIENT MESSAGE (OUTPATIENT)
Dept: OTOLARYNGOLOGY | Facility: CLINIC | Age: 69
End: 2019-05-06

## 2019-05-07 NOTE — TELEPHONE ENCOUNTER
From: May Enter  To: Cyndi Givens MD  Sent: 5/6/2019 11:23 PM CDT  Subject: Visit Follow-up Question    I asked the financial and billing department about a facility charge before my last visit to you.  They could not connect me with anybody to g

## 2019-05-23 ENCOUNTER — OFFICE VISIT (OUTPATIENT)
Dept: INTERNAL MEDICINE CLINIC | Facility: CLINIC | Age: 69
End: 2019-05-23
Payer: MEDICARE

## 2019-05-23 VITALS
BODY MASS INDEX: 24.55 KG/M2 | WEIGHT: 162 LBS | RESPIRATION RATE: 16 BRPM | DIASTOLIC BLOOD PRESSURE: 78 MMHG | HEIGHT: 68 IN | SYSTOLIC BLOOD PRESSURE: 122 MMHG

## 2019-05-23 DIAGNOSIS — H93.13 TINNITUS OF BOTH EARS: Primary | ICD-10-CM

## 2019-05-23 DIAGNOSIS — E78.2 MIXED HYPERLIPIDEMIA: ICD-10-CM

## 2019-05-23 DIAGNOSIS — R51.9 HEADACHE DISORDER: ICD-10-CM

## 2019-05-23 PROCEDURE — G0463 HOSPITAL OUTPT CLINIC VISIT: HCPCS | Performed by: INTERNAL MEDICINE

## 2019-05-23 PROCEDURE — 99214 OFFICE O/P EST MOD 30 MIN: CPT | Performed by: INTERNAL MEDICINE

## 2019-05-23 RX ORDER — HYDROCODONE BITARTRATE AND ACETAMINOPHEN 5; 325 MG/1; MG/1
1 TABLET ORAL EVERY 6 HOURS PRN
Qty: 20 TABLET | Refills: 0 | Status: SHIPPED | OUTPATIENT
Start: 2019-05-23 | End: 2019-08-26

## 2019-05-23 RX ORDER — ATORVASTATIN CALCIUM 20 MG/1
20 TABLET, FILM COATED ORAL DAILY
Qty: 90 TABLET | Refills: 3 | Status: SHIPPED | OUTPATIENT
Start: 2019-05-23 | End: 2020-06-09

## 2019-05-23 NOTE — PROGRESS NOTES
Toñito Piemntel is a 77year old male. HPI:   1. Tinnitus of both ears    Has been having tinnitus for years and it seems to be getting worse. Is very bothersome. Thinks his hearing is going as well. 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  A Resp 16   Ht 5' 8\" (1.727 m)   Wt 162 lb (73.5 kg)   BMI 24.63 kg/m²     GENERAL: well developed, well nourished,in no apparent distress  SKIN: no rashes,no suspicious lesions  HEENT: atraumatic, normocephalic,ears and throat are clear  NECK: supple,no

## 2019-06-18 ENCOUNTER — TELEPHONE (OUTPATIENT)
Dept: INTERNAL MEDICINE CLINIC | Facility: CLINIC | Age: 69
End: 2019-06-18

## 2019-06-18 NOTE — TELEPHONE ENCOUNTER
Will check at [facility] Thursday

## 2019-06-18 NOTE — TELEPHONE ENCOUNTER
From: Kelsey Monroy  To: Christy Smith MD  Sent: 6/17/2019  7:24 PM CDT  Subject: Other    About 3 weeks ago my , Isaura Boyer, saw Dr. Ronnell Dumont and at that time gave him a form to sign that is a [address] for Persons with

## 2019-06-20 NOTE — TELEPHONE ENCOUNTER
[facility] When patient calls back please inform him that his form and letter is ready for  at the [address]

## 2019-06-24 NOTE — TELEPHONE ENCOUNTER
Patient  notified that  forms are completed and ready for  at the [facility]

## 2019-08-26 ENCOUNTER — OFFICE VISIT (OUTPATIENT)
Dept: INTERNAL MEDICINE CLINIC | Facility: CLINIC | Age: 69
End: 2019-08-26
Payer: MEDICARE

## 2019-08-26 VITALS
DIASTOLIC BLOOD PRESSURE: 83 MMHG | HEIGHT: 69 IN | RESPIRATION RATE: 16 BRPM | BODY MASS INDEX: 23.7 KG/M2 | HEART RATE: 60 BPM | WEIGHT: 160 LBS | SYSTOLIC BLOOD PRESSURE: 125 MMHG

## 2019-08-26 DIAGNOSIS — R51.9 HEADACHE DISORDER: ICD-10-CM

## 2019-08-26 DIAGNOSIS — H93.13 TINNITUS OF BOTH EARS: ICD-10-CM

## 2019-08-26 DIAGNOSIS — E78.2 MIXED HYPERLIPIDEMIA: ICD-10-CM

## 2019-08-26 DIAGNOSIS — H53.8 BLURRED VISION, BILATERAL: Primary | ICD-10-CM

## 2019-08-26 PROCEDURE — G0463 HOSPITAL OUTPT CLINIC VISIT: HCPCS | Performed by: INTERNAL MEDICINE

## 2019-08-26 PROCEDURE — 99214 OFFICE O/P EST MOD 30 MIN: CPT | Performed by: INTERNAL MEDICINE

## 2019-08-26 RX ORDER — HYDROCODONE BITARTRATE AND ACETAMINOPHEN 5; 325 MG/1; MG/1
1 TABLET ORAL EVERY 6 HOURS PRN
Qty: 20 TABLET | Refills: 0 | Status: SHIPPED | OUTPATIENT
Start: 2019-08-26 | End: 2019-11-18

## 2019-08-26 NOTE — PROGRESS NOTES
Rossana Zelaya is a 77year old male. HPI:   1. Blurred vision, bilateral    Agree with seeing eye MD for follow up of this problem. Has appointment set up for next 2 weeks as well.      2. Tinnitus of both ears    Has been having tinnitus for years and denies abdominal pain and denies heartburn  NEURO: denies headaches    EXAM:   /83 (BP Location: Right arm, Patient Position: Sitting, Cuff Size: large)   Pulse 60   Resp 16   Ht 5' 9\" (1.753 m)   Wt 160 lb (72.6 kg)   BMI 23.63 kg/m²     GENERAL: w

## 2019-08-31 ENCOUNTER — APPOINTMENT (OUTPATIENT)
Dept: LAB | Facility: HOSPITAL | Age: 69
End: 2019-08-31
Attending: INTERNAL MEDICINE
Payer: MEDICARE

## 2019-08-31 DIAGNOSIS — E78.2 MIXED HYPERLIPIDEMIA: ICD-10-CM

## 2019-08-31 LAB
ALBUMIN SERPL-MCNC: 3.8 G/DL (ref 3.4–5)
ALBUMIN/GLOB SERPL: 1.3 {RATIO} (ref 1–2)
ALP LIVER SERPL-CCNC: 71 U/L (ref 45–117)
ALT SERPL-CCNC: 62 U/L (ref 16–61)
ANION GAP SERPL CALC-SCNC: 1 MMOL/L (ref 0–18)
AST SERPL-CCNC: 28 U/L (ref 15–37)
BILIRUB SERPL-MCNC: 0.6 MG/DL (ref 0.1–2)
BUN BLD-MCNC: 14 MG/DL (ref 7–18)
BUN/CREAT SERPL: 13.1 (ref 10–20)
CALCIUM BLD-MCNC: 9.7 MG/DL (ref 8.5–10.1)
CHLORIDE SERPL-SCNC: 109 MMOL/L (ref 98–112)
CO2 SERPL-SCNC: 35 MMOL/L (ref 21–32)
CREAT BLD-MCNC: 1.07 MG/DL (ref 0.7–1.3)
GLOBULIN PLAS-MCNC: 2.9 G/DL (ref 2.8–4.4)
GLUCOSE BLD-MCNC: 92 MG/DL (ref 70–99)
M PROTEIN MFR SERPL ELPH: 6.7 G/DL (ref 6.4–8.2)
OSMOLALITY SERPL CALC.SUM OF ELEC: 300 MOSM/KG (ref 275–295)
PATIENT FASTING: YES
POTASSIUM SERPL-SCNC: 4.3 MMOL/L (ref 3.5–5.1)
SODIUM SERPL-SCNC: 145 MMOL/L (ref 136–145)

## 2019-08-31 PROCEDURE — 36415 COLL VENOUS BLD VENIPUNCTURE: CPT

## 2019-08-31 PROCEDURE — 80053 COMPREHEN METABOLIC PANEL: CPT

## 2019-09-03 ENCOUNTER — OFFICE VISIT (OUTPATIENT)
Dept: OTOLARYNGOLOGY | Facility: CLINIC | Age: 69
End: 2019-09-03
Payer: MEDICARE

## 2019-09-03 VITALS
BODY MASS INDEX: 23.7 KG/M2 | HEIGHT: 69 IN | WEIGHT: 160 LBS | SYSTOLIC BLOOD PRESSURE: 128 MMHG | DIASTOLIC BLOOD PRESSURE: 78 MMHG

## 2019-09-03 DIAGNOSIS — H61.23 BILATERAL IMPACTED CERUMEN: Primary | ICD-10-CM

## 2019-09-03 PROCEDURE — 69210 REMOVE IMPACTED EAR WAX UNI: CPT | Performed by: OTOLARYNGOLOGY

## 2019-09-03 NOTE — PROGRESS NOTES
Juan David Wells is a 77year old male.   Patient presents with:  Ear Problem: pt preents with Cerumen impaction in both ears       HISTORY OF PRESENT ILLNESS    Patient presents for cerumen removal. No other complaints or concerns at this time    Social H Sitting, Cuff Size: adult)   Ht 5' 9\" (1.753 m)   Wt 160 lb (72.6 kg)   BMI 23.63 kg/m²        Constitutional Normal Overall appearance - Normal.        Neck Exam Normal Inspection - Normal. Palpation - Normal. Parotid gland - Normal. Thyroid gland - Norm

## 2019-09-04 RX ORDER — ATORVASTATIN CALCIUM 20 MG/1
20 TABLET, FILM COATED ORAL DAILY
Qty: 90 TABLET | Refills: 3 | OUTPATIENT
Start: 2019-09-04

## 2019-09-05 NOTE — TELEPHONE ENCOUNTER
Duplicate request, previously addressed. [id number] [place], [place] - [address], [phone], [phone]   Outpatient Medication Detail      Disp Refills Start End    atorvastatin 20 MG Oral Tab 90 tablet 3 5/23/2019     Sig - Route: Take 1 tablet (20 mg total) by mouth daily. At Bedtime - Oral    Sent to pharmacy as:  Atorvastatin Calcium 20 MG Oral Tablet    E-Prescribing Status: Receipt confirmed by pharmacy (5/23/2019 12:21 PM CDT)      Too soon for refill - message sent through my chart

## 2019-09-11 ENCOUNTER — TELEPHONE (OUTPATIENT)
Dept: OTHER | Age: 69
End: 2019-09-11

## 2019-09-11 NOTE — TELEPHONE ENCOUNTER
Pt states he received my chart letter with his results today and is concerned about the liver enzyme elevation. Pt had multiple questions about past results and that his anion gap results have been consistently abnormal for several years.     Pt states h

## 2019-09-12 ENCOUNTER — APPOINTMENT (OUTPATIENT)
Dept: LAB | Facility: HOSPITAL | Age: 69
End: 2019-09-12
Attending: INTERNAL MEDICINE
Payer: MEDICARE

## 2019-09-12 ENCOUNTER — OFFICE VISIT (OUTPATIENT)
Dept: INTERNAL MEDICINE CLINIC | Facility: CLINIC | Age: 69
End: 2019-09-12
Payer: MEDICARE

## 2019-09-12 VITALS
HEIGHT: 69 IN | WEIGHT: 159 LBS | RESPIRATION RATE: 16 BRPM | BODY MASS INDEX: 23.55 KG/M2 | SYSTOLIC BLOOD PRESSURE: 117 MMHG | HEART RATE: 78 BPM | DIASTOLIC BLOOD PRESSURE: 75 MMHG

## 2019-09-12 DIAGNOSIS — R51.9 HEADACHE DISORDER: ICD-10-CM

## 2019-09-12 DIAGNOSIS — E78.2 MIXED HYPERLIPIDEMIA: ICD-10-CM

## 2019-09-12 DIAGNOSIS — R53.82 CHRONIC FATIGUE: Primary | ICD-10-CM

## 2019-09-12 DIAGNOSIS — H93.13 TINNITUS OF BOTH EARS: ICD-10-CM

## 2019-09-12 DIAGNOSIS — R53.82 CHRONIC FATIGUE: ICD-10-CM

## 2019-09-12 PROCEDURE — 99214 OFFICE O/P EST MOD 30 MIN: CPT | Performed by: INTERNAL MEDICINE

## 2019-09-12 PROCEDURE — 36415 COLL VENOUS BLD VENIPUNCTURE: CPT

## 2019-09-12 PROCEDURE — G0463 HOSPITAL OUTPT CLINIC VISIT: HCPCS | Performed by: INTERNAL MEDICINE

## 2019-09-12 PROCEDURE — 80076 HEPATIC FUNCTION PANEL: CPT

## 2019-09-12 NOTE — PROGRESS NOTES
Sarthak Gastelum is a 77year old male. HPI:   1. Fatigue    Has been more fatigued than usual the last few weeks. Has been sleeping quite poorly the last few years as well. Has NOT been feeling rested.      2. Tinnitus of both ears    Has been having tin exertion  GI: denies abdominal pain and denies heartburn  NEURO: denies headaches    EXAM:   /75 (BP Location: Right arm, Patient Position: Sitting, Cuff Size: large)   Pulse 78   Resp 16   Ht 5' 9\" (1.753 m)   Wt 159 lb (72.1 kg)   BMI 23.48 kg/m²

## 2019-11-18 ENCOUNTER — OFFICE VISIT (OUTPATIENT)
Dept: INTERNAL MEDICINE CLINIC | Facility: CLINIC | Age: 69
End: 2019-11-18
Payer: MEDICARE

## 2019-11-18 VITALS
DIASTOLIC BLOOD PRESSURE: 82 MMHG | SYSTOLIC BLOOD PRESSURE: 147 MMHG | BODY MASS INDEX: 23.25 KG/M2 | HEIGHT: 69 IN | WEIGHT: 157 LBS | RESPIRATION RATE: 16 BRPM

## 2019-11-18 DIAGNOSIS — R51.9 HEADACHE DISORDER: ICD-10-CM

## 2019-11-18 DIAGNOSIS — Z23 NEED FOR VACCINATION: ICD-10-CM

## 2019-11-18 DIAGNOSIS — H93.13 TINNITUS OF BOTH EARS: ICD-10-CM

## 2019-11-18 DIAGNOSIS — E78.2 MIXED HYPERLIPIDEMIA: ICD-10-CM

## 2019-11-18 DIAGNOSIS — R53.82 CHRONIC FATIGUE: Primary | ICD-10-CM

## 2019-11-18 PROCEDURE — G0008 ADMIN INFLUENZA VIRUS VAC: HCPCS | Performed by: INTERNAL MEDICINE

## 2019-11-18 PROCEDURE — 90662 IIV NO PRSV INCREASED AG IM: CPT | Performed by: INTERNAL MEDICINE

## 2019-11-18 PROCEDURE — G0463 HOSPITAL OUTPT CLINIC VISIT: HCPCS | Performed by: INTERNAL MEDICINE

## 2019-11-18 PROCEDURE — 99214 OFFICE O/P EST MOD 30 MIN: CPT | Performed by: INTERNAL MEDICINE

## 2019-11-18 RX ORDER — HYDROCODONE BITARTRATE AND ACETAMINOPHEN 5; 325 MG/1; MG/1
1 TABLET ORAL EVERY 6 HOURS PRN
Qty: 20 TABLET | Refills: 0 | Status: SHIPPED | OUTPATIENT
Start: 2019-11-18 | End: 2020-02-24

## 2019-11-18 NOTE — PROGRESS NOTES
Amara Omer is a 77year old male. HPI:   1. Fatigue    Has been more fatigued than usual the last few weeks. Has been sleeping quite poorly the last few years as well. Has NOT been feeling rested.      Wt Readings from Last 6 Encounters:  11/18/19 : REVIEW OF SYSTEMS:     GENERAL HEALTH: feels well otherwise  SKIN: denies any unusual skin lesions or rashes  RESPIRATORY: denies shortness of breath with exertion  CARDIOVASCULAR: denies chest pain on exertion  GI: denies abdominal pain and denies hea disorder    Continues to have headache issues. Seeing a headache specialist regularly to help with this condition. Getting eye evaluation in next few weeks. Refill hydrocodone 5/325    5.  Need for vaccination      Patient is to receive HIGH DOSE FLU SHOT T

## 2020-02-24 ENCOUNTER — OFFICE VISIT (OUTPATIENT)
Dept: INTERNAL MEDICINE CLINIC | Facility: CLINIC | Age: 70
End: 2020-02-24
Payer: MEDICARE

## 2020-02-24 VITALS
RESPIRATION RATE: 16 BRPM | BODY MASS INDEX: 22.81 KG/M2 | WEIGHT: 154 LBS | DIASTOLIC BLOOD PRESSURE: 78 MMHG | HEART RATE: 64 BPM | SYSTOLIC BLOOD PRESSURE: 135 MMHG | HEIGHT: 69 IN

## 2020-02-24 DIAGNOSIS — Z00.00 PHYSICAL EXAM, ANNUAL: Primary | ICD-10-CM

## 2020-02-24 DIAGNOSIS — Z12.5 SCREENING FOR PROSTATE CANCER: ICD-10-CM

## 2020-02-24 DIAGNOSIS — Z12.11 SCREENING FOR COLORECTAL CANCER: ICD-10-CM

## 2020-02-24 DIAGNOSIS — Z12.12 SCREENING FOR COLORECTAL CANCER: ICD-10-CM

## 2020-02-24 DIAGNOSIS — E78.2 MIXED HYPERLIPIDEMIA: ICD-10-CM

## 2020-02-24 PROCEDURE — G0439 PPPS, SUBSEQ VISIT: HCPCS | Performed by: INTERNAL MEDICINE

## 2020-02-24 RX ORDER — HYDROCODONE BITARTRATE AND ACETAMINOPHEN 5; 325 MG/1; MG/1
1 TABLET ORAL EVERY 6 HOURS PRN
Qty: 20 TABLET | Refills: 0 | Status: SHIPPED | OUTPATIENT
Start: 2020-02-24 | End: 2020-05-18

## 2020-02-24 NOTE — PROGRESS NOTES
REASON FOR VISIT:    Elroy Kurtz is a 77year old male who presents for a Medicare Annual Wellness visit.      Patient Care Team: Patient Care Team:  Rogers Pederson MD as PCP - General (Internal Medicine)  João Briseno MD as PCP - [facility] 4/12/2019 8/21/2018 8/9/2017 10/20/2016   AST 15 - 37 U/L 22 28 24 21 18 20   ALT 16 - 61 U/L 36 62(H) 42 19 14(L) 23     TSH and Free T4 Latest Ref Rng & Units 4/12/2019 8/21/2018 9/9/2017 8/9/2017 10/20/2016   TSH 0.358 - 3.740 mIU/mL 1.810 3.61 2.28 1.1 have a healthcare power of ?: Yes  Do you have a living will?: Yes     Cognitive Assessment     What day of the week is this?: Correct  What month is it?: Correct  What year is it?: Correct  Recall \"Ball\": Correct  Recall \"Flag\": Correct  Recal 10/24/2018  11/18/2019      FLUAD High Dose 65 yr and older (88694)                          11/06/2017      Influenza             10/23/2018      Pneumococcal (Prevnar 13)                          11/19/2018      Pneumovax 23          11/01 back  EXTREMITIES: no cyanosis, clubbing or edema  NEURO: Oriented times three, cranial nerves are intact, motor and sensory are grossly intact      ASSESSMENT AND OTHER RELEVANT CHRONIC CONDITIONS:   Manuel Favorite is a 77year old male who presents fo

## 2020-02-26 ENCOUNTER — TELEPHONE (OUTPATIENT)
Dept: GASTROENTEROLOGY | Facility: CLINIC | Age: 70
End: 2020-02-26

## 2020-02-26 DIAGNOSIS — Z86.010 PERSONAL HISTORY OF COLONIC POLYPS: Primary | ICD-10-CM

## 2020-02-26 NOTE — TELEPHONE ENCOUNTER
The patient's chart has been reviewed. Okay to schedule pt for 3 year colonoscopy recall r/t history of colon polyps with Dr. Marimar Monet.      Advise MAC sedation related to known history of high sedation requirements with split dose Colyte/TriLyte or equ

## 2020-02-26 NOTE — TELEPHONE ENCOUNTER
Shaan Brooke  MR #:   [id number] :   3/5/1950 [facility]  PHYSICIAN:  Kenyatta Gonzalez M.D. Operative Report    DATE OF PROCEDURE: May 26, 2017    PREOPERATIVE DIAGNOSES:    1. Colorectal cancer screening. 2.  Abdominal pain.          POSTOPERATIVE the anal verge was normal with an intact vascular pattern. There were multiple colon polyps encountered and removed as follows:         1. In the ascending colon there were a total of three polyps measuring approximately 5-9 mm in size.   The polyps had a Insufflated air and fluid were suctioned and the endoscope was withdrawn. There were no immediate complications. IMPRESSION:    1. Colon polyps. 2.  Sigmoid colon diverticulosis. 3.  Fair colonoscopic preparation.     4.  Normal upper endosc

## 2020-02-26 NOTE — TELEPHONE ENCOUNTER
Kian Rosas-     Patient is due for CLN. I reviewed medications, allergies and pharmacy. Please advise on orders and prep. Thank you.

## 2020-02-26 NOTE — TELEPHONE ENCOUNTER
Component  Ref Range & Units    Case Report   Surgical Pathology                                Case: [id number]                                   Authorizing Provider: Lindsey Caal MD   Collected:           05/26/2017                    Pathol Giemsa stain (with appropriate control reactivity).     E.  Distal esophagus; biopsy:   · Fragments of squamous esophageal mucosa demonstrating features compatible with mild chronic reflux esophagitis and small superficial focus of minimally active/acute es specimen container are multiple irregular to granular fragments of tan-brown to white foreign body food/fecal material measuring 3.2 cm in maximum dimension in aggregate.  This component of the specimen is submitted for gross examination only.      Specimen

## 2020-02-26 NOTE — TELEPHONE ENCOUNTER
Last Procedure, Date, MD: Colonoscopy w/ polyptectomy   5/26/2017, Dr. Mireles Alert,   Last Diagnosis:   1. Colon polyps.     2. Sigmoid colon diverticulosis.     3.   Fair colonoscopic preparation.     4.  Normal upper endoscopy  Recalled for (mth/yrs): 3

## 2020-02-26 NOTE — TELEPHONE ENCOUNTER
Pt calling to schedule repeat CLN in May. Pt aware of at least 72hr call back time.  Please call [phone](CARRINGTON schultz)

## 2020-02-27 ENCOUNTER — TELEPHONE (OUTPATIENT)
Dept: GASTROENTEROLOGY | Facility: CLINIC | Age: 70
End: 2020-02-27

## 2020-02-27 ENCOUNTER — OFFICE VISIT (OUTPATIENT)
Dept: OTOLARYNGOLOGY | Facility: CLINIC | Age: 70
End: 2020-02-27
Payer: MEDICARE

## 2020-02-27 VITALS
WEIGHT: 152 LBS | SYSTOLIC BLOOD PRESSURE: 134 MMHG | HEART RATE: 63 BPM | TEMPERATURE: 98 F | BODY MASS INDEX: 22.51 KG/M2 | HEIGHT: 69 IN | DIASTOLIC BLOOD PRESSURE: 77 MMHG

## 2020-02-27 DIAGNOSIS — Z86.010 PERSONAL HISTORY OF COLONIC POLYPS: Primary | ICD-10-CM

## 2020-02-27 DIAGNOSIS — H61.23 BILATERAL IMPACTED CERUMEN: Primary | ICD-10-CM

## 2020-02-27 PROCEDURE — 69210 REMOVE IMPACTED EAR WAX UNI: CPT | Performed by: OTOLARYNGOLOGY

## 2020-02-27 NOTE — TELEPHONE ENCOUNTER
Scheduled for:  Colonoscopy - 30235  Provider Name:  Dr. Negro Benedict  Date:  5/26/20  Location:  [facility]  Sedation:  MAC  Time:  8:30 am (pt is aware to arrive at 7:30 am)  Prep:  Colyte, Prep instructions were given to pt's wife over the phone, pt verbalized

## 2020-02-27 NOTE — PROGRESS NOTES
Bryn Mac is a 77year old male.   Patient presents with:  Ear Wax      HISTORY OF PRESENT ILLNESS    Patient presents for cerumen removal. No other complaints or concerns at this time    Social History    Socioeconomic History      Marital status: 22.45 kg/m²        Constitutional Normal Overall appearance - Normal.        Neck Exam Normal Inspection - Normal. Palpation - Normal. Parotid gland - Normal. Thyroid gland - Normal.             Head/Face Normal Facial features - Normal. Eyebrows - Normal. removal in the future. Vicky Blanchard.  Maynor Casillas MD    2/27/2020    3:46 PM

## 2020-02-27 NOTE — TELEPHONE ENCOUNTER
Rescheduled for:  Colonoscopy - 41965  Provider Name:  Dr. Marco Tejada  Date: FROM- 5/26/20               TO 6/8/20  Location:  [facility]  Sedation:  MAC  Time:  FROM- 8:30 am        TO-7:30am (pt is aware to arrive at 6:30am)    Prep:  Colyte, Prep instruction

## 2020-02-27 NOTE — TELEPHONE ENCOUNTER
Pts wife requesting to speak to Surgery sched to resched pts 5/26/20 proc. Wife wants to know if proc can be done in June?

## 2020-05-11 ENCOUNTER — TELEPHONE (OUTPATIENT)
Dept: GASTROENTEROLOGY | Facility: CLINIC | Age: 70
End: 2020-05-11

## 2020-05-11 DIAGNOSIS — Z86.010 HISTORY OF COLON POLYPS: Primary | ICD-10-CM

## 2020-05-11 NOTE — TELEPHONE ENCOUNTER
Patient contacted. He confirms he wants to cancel 6/8 colonoscopy. Aware I am forwarding to GI schedulers to reschedule. Cancelled in epic, case request sent to endo. Thanks.

## 2020-05-13 ENCOUNTER — APPOINTMENT (OUTPATIENT)
Dept: LAB | Facility: HOSPITAL | Age: 70
End: 2020-05-13
Attending: INTERNAL MEDICINE
Payer: MEDICARE

## 2020-05-13 DIAGNOSIS — Z12.5 SCREENING FOR PROSTATE CANCER: ICD-10-CM

## 2020-05-13 DIAGNOSIS — E78.2 MIXED HYPERLIPIDEMIA: ICD-10-CM

## 2020-05-13 PROCEDURE — 80053 COMPREHEN METABOLIC PANEL: CPT

## 2020-05-13 PROCEDURE — 80061 LIPID PANEL: CPT

## 2020-05-13 PROCEDURE — 84443 ASSAY THYROID STIM HORMONE: CPT

## 2020-05-13 PROCEDURE — 36415 COLL VENOUS BLD VENIPUNCTURE: CPT

## 2020-05-13 PROCEDURE — 81003 URINALYSIS AUTO W/O SCOPE: CPT

## 2020-05-13 PROCEDURE — 85025 COMPLETE CBC W/AUTO DIFF WBC: CPT

## 2020-05-18 ENCOUNTER — VIRTUAL PHONE E/M (OUTPATIENT)
Dept: INTERNAL MEDICINE CLINIC | Facility: CLINIC | Age: 70
End: 2020-05-18
Payer: MEDICARE

## 2020-05-18 DIAGNOSIS — R51.9 HEADACHE DISORDER: ICD-10-CM

## 2020-05-18 DIAGNOSIS — E78.2 MIXED HYPERLIPIDEMIA: ICD-10-CM

## 2020-05-18 DIAGNOSIS — H93.13 TINNITUS OF BOTH EARS: ICD-10-CM

## 2020-05-18 DIAGNOSIS — R53.82 CHRONIC FATIGUE: Primary | ICD-10-CM

## 2020-05-18 PROCEDURE — 99442 PHONE E/M BY PHYS 11-20 MIN: CPT | Performed by: INTERNAL MEDICINE

## 2020-05-18 RX ORDER — HYDROCODONE BITARTRATE AND ACETAMINOPHEN 5; 325 MG/1; MG/1
1 TABLET ORAL EVERY 6 HOURS PRN
Qty: 20 TABLET | Refills: 0 | Status: SHIPPED | OUTPATIENT
Start: 2020-05-18 | End: 2020-08-20

## 2020-05-18 NOTE — PROGRESS NOTES
Patient ID: David Perdomo is a 72year old male. No chief complaint on file. Virtual/Telephone Check-In    David Perdomo verbally consents to   David Perdomo is a 72year old male. HPI:   1.  Fatigue    Has been more fatigued than usual daily. Past Medical History:   Diagnosis Date   • Colon polyp 05/2017   • Esophageal reflux    • Tinnitus       Social History:  Social History    Tobacco Use      Smoking status: Never Smoker      Smokeless tobacco: Never Used    Alcohol use:  No make tinnitus worse. 3. Mixed Hyperlipidemia    Patient has been following low cholesterol diet and has been taking and tolerating Cholesterol medicine as prescribed.  No serious side effects such as rash, muscle tenderness or weakness have been encount

## 2020-06-09 RX ORDER — ATORVASTATIN CALCIUM 20 MG/1
20 TABLET, FILM COATED ORAL DAILY
Qty: 90 TABLET | Refills: 3 | Status: ON HOLD | OUTPATIENT
Start: 2020-06-09 | End: 2020-11-01

## 2020-06-17 NOTE — TELEPHONE ENCOUNTER
Scheduled for:  Colonoscopy 27710  Provider Name:  Dr. Cheyenne Yip  Date:  7/24/20  Location:    [facility]  Sedation:  MAC  Time:  1000 (pt is aware that [address] will call the day before to confirm arrival time)   Prep:   Talita, sent new  Instructions via [employer]

## 2020-07-15 ENCOUNTER — TELEPHONE (OUTPATIENT)
Dept: GASTROENTEROLOGY | Facility: CLINIC | Age: 70
End: 2020-07-15

## 2020-07-15 NOTE — TELEPHONE ENCOUNTER
Canceled in [place]:    Please cancel in OMP and contact patient to reschedule.     Thank You.     TE from 5-:    Scheduled for:  Colonoscopy 37924  Provider Name:  Dr. Maxine Lennon  Date:  7/24/20  Location:    [facility]  Sedation:  MAC  Stevens Virgil

## 2020-07-16 NOTE — TELEPHONE ENCOUNTER
Cancelled for:  Colonoscopy 89307  Provider Name:  Dr. Winchester  Date:  7/24/20  [address]  Time:  1000  Prep:   Colyte  Meds/Allergies Reconciled?:  Physician reviewed   Diagnosis with codes:  History of colon polyps Z86.010  Wa

## 2020-08-20 ENCOUNTER — VIRTUAL PHONE E/M (OUTPATIENT)
Dept: INTERNAL MEDICINE CLINIC | Facility: CLINIC | Age: 70
End: 2020-08-20

## 2020-08-20 DIAGNOSIS — H93.13 TINNITUS OF BOTH EARS: Primary | ICD-10-CM

## 2020-08-20 DIAGNOSIS — R53.82 CHRONIC FATIGUE: ICD-10-CM

## 2020-08-20 PROCEDURE — 99442 PHONE E/M BY PHYS 11-20 MIN: CPT | Performed by: INTERNAL MEDICINE

## 2020-08-20 RX ORDER — HYDROCODONE BITARTRATE AND ACETAMINOPHEN 5; 325 MG/1; MG/1
1 TABLET ORAL EVERY 6 HOURS PRN
Qty: 20 TABLET | Refills: 0 | Status: ON HOLD | OUTPATIENT
Start: 2020-08-20 | End: 2020-11-01

## 2020-08-20 NOTE — PROGRESS NOTES
Patient ID: Christen Rogel is a 72year old male. 1. Tinnitus of both ears  The patient complains of chronic tinnitus of both ears that has gotten worse recently.   He had his ears cleaned out by Dr. Ana Rosa Cotton of ENT and that did seem to help somewhat bu mg by mouth 2 (two) times daily. , Disp: , Rfl:   •  PARoxetine HCl 40 MG Oral Tab, Take 40 mg by mouth daily. , Disp: , Rfl:     Allergies:No Known Allergies    Social History    Socioeconomic History      Marital status:       Spouse name: Not on fi disorder is been rather chronic is not getting better so seeing neurology might be a good idea to try to look into other causes and he will see Dr. felton cough and neurology.     2. Chronic fatigue    The patient has chronic fatigue that is been an issue b

## 2020-09-24 ENCOUNTER — OFFICE VISIT (OUTPATIENT)
Dept: OTOLARYNGOLOGY | Facility: CLINIC | Age: 70
End: 2020-09-24
Payer: MEDICARE

## 2020-09-24 VITALS
HEIGHT: 69 IN | SYSTOLIC BLOOD PRESSURE: 128 MMHG | DIASTOLIC BLOOD PRESSURE: 77 MMHG | BODY MASS INDEX: 22.51 KG/M2 | TEMPERATURE: 98 F | WEIGHT: 152 LBS

## 2020-09-24 DIAGNOSIS — H61.23 BILATERAL IMPACTED CERUMEN: Primary | ICD-10-CM

## 2020-09-24 PROCEDURE — 69210 REMOVE IMPACTED EAR WAX UNI: CPT | Performed by: OTOLARYNGOLOGY

## 2020-09-24 NOTE — PROGRESS NOTES
Rossana Zelaya is a 72year old male.   Patient presents with:  Cerumen Impaction: Patient Presents with Bilateral Ear wax removal       HISTORY OF PRESENT ILLNESS    Patient presents for cerumen removal. No other complaints or concerns at this time    S Position: Sitting, Cuff Size: adult)   Temp 97.5 °F (36.4 °C) (Tympanic)   Ht 5' 9\" (1.753 m)   Wt 152 lb (68.9 kg)   BMI 22.45 kg/m²        Constitutional Normal Overall appearance - Normal.        Neck Exam Normal Inspection - Normal. Palpation - Normal INSTRUMENTATION, UNILATERAL      All cerumen was removed using microscopy. I have asked the patient to return to see me as needed for repeat cerumen removal in the future. Greg Stringer.  Angelo Joshua MD    9/24/2020    10:28 AM

## 2020-10-22 ENCOUNTER — PATIENT MESSAGE (OUTPATIENT)
Dept: INTERNAL MEDICINE CLINIC | Facility: CLINIC | Age: 70
End: 2020-10-22

## 2020-10-22 NOTE — TELEPHONE ENCOUNTER
From: Rodolfo Danielson  To: Sae Dougherty MD  Sent: 10/22/2020 2:07 PM CDT  Subject: Other    I received a notice that I have an appointment scheduled for December 17, and that appointment should be for November 20 since I schedule them every 3 mon

## 2020-10-29 ENCOUNTER — APPOINTMENT (OUTPATIENT)
Dept: CT IMAGING | Facility: HOSPITAL | Age: 70
DRG: 085 | End: 2020-10-29
Attending: EMERGENCY MEDICINE
Payer: MEDICARE

## 2020-10-29 ENCOUNTER — HOSPITAL ENCOUNTER (INPATIENT)
Facility: HOSPITAL | Age: 70
LOS: 3 days | Discharge: HOME HEALTH | DRG: 085 | End: 2020-11-01
Attending: EMERGENCY MEDICINE | Admitting: HOSPITALIST
Payer: MEDICARE

## 2020-10-29 DIAGNOSIS — S06.5X9A ACUTE SUBDURAL HEMATOMA (HCC): ICD-10-CM

## 2020-10-29 DIAGNOSIS — I60.9 SUBARACHNOID HEMORRHAGE (HCC): ICD-10-CM

## 2020-10-29 DIAGNOSIS — R29.6 FREQUENT FALLS: Primary | ICD-10-CM

## 2020-10-29 PROBLEM — S06.5XAA ACUTE SUBDURAL HEMATOMA: Status: ACTIVE | Noted: 2020-10-29

## 2020-10-29 PROBLEM — S06.5XAA ACUTE SUBDURAL HEMATOMA (HCC): Status: ACTIVE | Noted: 2020-10-29

## 2020-10-29 PROCEDURE — 70450 CT HEAD/BRAIN W/O DYE: CPT | Performed by: EMERGENCY MEDICINE

## 2020-10-29 PROCEDURE — 99223 1ST HOSP IP/OBS HIGH 75: CPT | Performed by: HOSPITALIST

## 2020-10-29 PROCEDURE — 72125 CT NECK SPINE W/O DYE: CPT | Performed by: EMERGENCY MEDICINE

## 2020-10-29 RX ORDER — LORAZEPAM 2 MG/ML
1 INJECTION INTRAMUSCULAR
Status: DISCONTINUED | OUTPATIENT
Start: 2020-10-29 | End: 2020-10-30

## 2020-10-29 RX ORDER — LORAZEPAM 1 MG/1
1 TABLET ORAL
Status: DISCONTINUED | OUTPATIENT
Start: 2020-10-29 | End: 2020-10-29

## 2020-10-29 RX ORDER — ACETAMINOPHEN 325 MG/1
650 TABLET ORAL EVERY 6 HOURS PRN
Status: DISCONTINUED | OUTPATIENT
Start: 2020-10-29 | End: 2020-11-01

## 2020-10-29 RX ORDER — LEVETIRACETAM 500 MG/1
500 TABLET ORAL 2 TIMES DAILY
Status: DISCONTINUED | OUTPATIENT
Start: 2020-10-29 | End: 2020-11-01

## 2020-10-29 RX ORDER — MELATONIN
100 DAILY
Status: DISCONTINUED | OUTPATIENT
Start: 2020-10-30 | End: 2020-11-01

## 2020-10-29 RX ORDER — ALPRAZOLAM 1 MG/1
1 TABLET ORAL EVERY 4 HOURS PRN
Status: DISCONTINUED | OUTPATIENT
Start: 2020-10-29 | End: 2020-10-30

## 2020-10-29 RX ORDER — ONDANSETRON 2 MG/ML
4 INJECTION INTRAMUSCULAR; INTRAVENOUS EVERY 6 HOURS PRN
Status: DISCONTINUED | OUTPATIENT
Start: 2020-10-29 | End: 2020-11-01

## 2020-10-29 RX ORDER — LORAZEPAM 2 MG/ML
1 INJECTION INTRAMUSCULAR
Status: DISCONTINUED | OUTPATIENT
Start: 2020-10-29 | End: 2020-10-29

## 2020-10-29 RX ORDER — LORAZEPAM 1 MG/1
2 TABLET ORAL
Status: DISCONTINUED | OUTPATIENT
Start: 2020-10-29 | End: 2020-10-29

## 2020-10-29 RX ORDER — ATORVASTATIN CALCIUM 20 MG/1
20 TABLET, FILM COATED ORAL NIGHTLY
Status: DISCONTINUED | OUTPATIENT
Start: 2020-10-29 | End: 2020-11-01

## 2020-10-29 RX ORDER — MAGNESIUM OXIDE 400 MG (241.3 MG MAGNESIUM) TABLET
400 TABLET ONCE
Status: COMPLETED | OUTPATIENT
Start: 2020-10-29 | End: 2020-10-29

## 2020-10-29 RX ORDER — LORAZEPAM 1 MG/1
2 TABLET ORAL
Status: DISCONTINUED | OUTPATIENT
Start: 2020-10-29 | End: 2020-10-30

## 2020-10-29 RX ORDER — LORAZEPAM 2 MG/ML
0.5 INJECTION INTRAMUSCULAR ONCE
Status: COMPLETED | OUTPATIENT
Start: 2020-10-29 | End: 2020-10-29

## 2020-10-29 RX ORDER — SODIUM CHLORIDE 9 MG/ML
INJECTION, SOLUTION INTRAVENOUS CONTINUOUS
Status: DISCONTINUED | OUTPATIENT
Start: 2020-10-29 | End: 2020-11-01

## 2020-10-29 RX ORDER — LORAZEPAM 2 MG/ML
2 INJECTION INTRAMUSCULAR
Status: DISCONTINUED | OUTPATIENT
Start: 2020-10-29 | End: 2020-10-29

## 2020-10-29 RX ORDER — LORAZEPAM 2 MG/ML
2 INJECTION INTRAMUSCULAR
Status: DISCONTINUED | OUTPATIENT
Start: 2020-10-29 | End: 2020-10-30

## 2020-10-29 RX ORDER — MULTIPLE VITAMINS W/ MINERALS TAB 9MG-400MCG
1 TAB ORAL DAILY
Status: DISCONTINUED | OUTPATIENT
Start: 2020-10-29 | End: 2020-11-01

## 2020-10-29 RX ORDER — LORAZEPAM 2 MG/ML
1 INJECTION INTRAMUSCULAR EVERY 6 HOURS PRN
Status: DISCONTINUED | OUTPATIENT
Start: 2020-10-29 | End: 2020-10-30

## 2020-10-29 RX ORDER — ALPRAZOLAM 0.5 MG/1
0.5 TABLET ORAL SEE ADMIN INSTRUCTIONS
Status: ON HOLD | COMMUNITY
Start: 2020-10-14 | End: 2020-10-31

## 2020-10-29 RX ORDER — FOLIC ACID 1 MG/1
1 TABLET ORAL DAILY
Status: DISCONTINUED | OUTPATIENT
Start: 2020-10-29 | End: 2020-11-01

## 2020-10-29 RX ORDER — PAROXETINE HYDROCHLORIDE 20 MG/1
40 TABLET, FILM COATED ORAL DAILY
Status: DISCONTINUED | OUTPATIENT
Start: 2020-10-29 | End: 2020-11-01

## 2020-10-29 RX ORDER — ALPRAZOLAM 0.25 MG/1
0.25 TABLET ORAL 2 TIMES DAILY
Status: DISCONTINUED | OUTPATIENT
Start: 2020-10-29 | End: 2020-10-29

## 2020-10-29 RX ORDER — ALPRAZOLAM 0.5 MG/1
0.5 TABLET ORAL
Status: DISCONTINUED | OUTPATIENT
Start: 2020-10-29 | End: 2020-10-31

## 2020-10-29 RX ORDER — LORAZEPAM 1 MG/1
1 TABLET ORAL
Status: DISCONTINUED | OUTPATIENT
Start: 2020-10-29 | End: 2020-10-30

## 2020-10-29 RX ORDER — ONDANSETRON 2 MG/ML
4 INJECTION INTRAMUSCULAR; INTRAVENOUS ONCE
Status: COMPLETED | OUTPATIENT
Start: 2020-10-29 | End: 2020-10-29

## 2020-10-29 NOTE — ED NOTES
Patient complains of headache 6/10, states he does not want medicine because of his tinnitus, vss, patient aware of plan of care, patient asking for water, dr Michelle James states patient cannot have water at this time, patient aware

## 2020-10-29 NOTE — H&P
Ashley 56 Patient Status:  Emergency    3/5/1950 MRN [id number]   Location [address] Attending Jess Ramos MD   Hosp Day # 0 PCP Man Huang MD     Date Sig: Take 40 mg by mouth daily. PEG 3350-KCl-NaBcb-NaCl-NaSulf (COLYTE WITH FLAVOR PACKS) 240 g Oral Recon Soln   No No   Sig: Take prep as directed by gastro office.  May substitute with Trilyte/generic equivalent if needed   alprazolam 0.25 MG Oral Ta clear and coherent. Psychiatric:  Cooperative, appropriate mood & affect.       Laboratory Data:   Lab Results   Component Value Date    WBC 5.6 10/29/2020    HGB 14.7 10/29/2020    HCT 44.7 10/29/2020    .0 10/29/2020    CREATSERUM 0.80 10/29/2020 protocol    Prophylaxis  SCDs, heparin on hold considering subdural and subarachnoid bleed    CODE STATUS  Full    Primary care physician  Alec Slaughter MD    Disposition  Clinical course will dictate outcome      Luz Elena Smyth MD  10/29/2020  5:2

## 2020-10-29 NOTE — ED NOTES
Patient complaining of his pulse getting high and when that happens he says his tinnitus gets worse, dr Kenneth Maurice aware, verbal order for 0.5 mg ativan iv

## 2020-10-29 NOTE — CM/SW NOTE
GERRY received- ETOH/SA & d/c planning. SW spoke w/RN Paulino during rounds this morning. Pt admitted from home w/wife. Pt self-medicates his tinnitus w/ETOH & has had multiple falls over the past several wks.   Most recent fall resulted in a scalp lacerati

## 2020-10-29 NOTE — PROGRESS NOTES
Post midnight follow up note. Patient was seen and examined. +tinnitus, +headache, +blurred vision which is chronic.   Neuro surgery following  Continue keppra per Neuro sx  Repeat CT scan in am  Psych consult  May need neuro eval either inpatient or outpa

## 2020-10-29 NOTE — BH PROGRESS NOTE
Behavioral Health Note:  REASON FOR ADMISSION:   Frequent falls     REASON FOR CONSULT:  Psychiatry Consultation requested for evaluation and advice for CIWA, hx depression and anxiety.      OBJECTIVE:  Aron Martinez is a 72year old male with PMH significant hype disorder, r/o unspecified anxiety disorder    PLAN  1. Psych Consult ordered for Dr. Curt Evans   2. Substance abuse treatment facilities/programming is unfortunately not covered by any Medicare plans.  The only options available for treatment are AA groups
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
## 2020-10-29 NOTE — ED PROVIDER NOTES
Patient Seen in: [facility] AND [facility] Emergency Department      History   Patient presents with:  Fall    Stated Complaint: fall    HPI    25-year-old male with history of chronic tinnitus, self-medicating with alcohol, here with increasing frequency of fal HPI.  Constitutional and vital signs reviewed. All other systems reviewed and negative except as noted above.     Physical Exam     ED Triage Vitals [10/29/20 0225]   /76   Pulse 78   Resp 18   Temp 98.6 °F (37 °C)   Temp src Oral   SpO2 100 % Result Value Ref Range    Hold Lt Green Auto Resulted    RAINBOW DRAW GOLD    Collection Time: 10/29/20  2:41 AM   Result Value Ref Range    Hold Gold Auto Resulted    BASIC METABOLIC PANEL (8)    Collection Time: 10/29/20  2:41 AM   Result Value Ref Ran Result Value Ref Range    ABO BLOOD TYPE B     RH BLOOD TYPE Positive    ANTIBODY SCREEN    Collection Time: 10/29/20  3:25 AM   Result Value Ref Range    Antibody Screen Negative    ED/MRSA SCREEN BY PCR-CC    Collection Time: 10/29/20  4:37 AM   Result supplementation noted. Mild compression of the right lateral ventricle temporal horn and trigone. No specific evidence of entrapment. Visualized paranasal sinuses and mastoids are clear.   Unchanged sclerotic lesion right side of the clivus measuring 1 MAKING:  After obtaining the patient's history, performing the physical exam and reviewing the diagnostics, multiple initial diagnoses were considered based on the presenting problem including ICH, fracture, contusion.     Critical Care Time:  Total critica

## 2020-10-29 NOTE — CONSULTS
[facility]    Neurosurgery Consultation    Andreatucker Vazquez Patient Status:  Emergency    3/5/1950 MRN [id number]   Location [address] Attending Alfredo Gage MD   Hosp Day # 0 PCP Laureen Wood MD Once    Review of Systems:  A 10-point system was reviewed. Pertinent positives and negatives are noted in HPI.       Physical Exam:  Temp:  [98.6 °F (37 °C)] 98.6 °F (37 °C)  Pulse:  [74-92] 92  Resp:  [14-20] 18  BP: (120-138)/(70-85) 138/75  I/O last 3 Assessment/Plan:  Active Problems:    Frequent falls    S/p fall with R tSDH, non-operative. No hx of blood thinners, neuro intact. Repeat CTH tomorrow. Keppra 500 B x 6 days  Keep off all blood thinners. Will follow.     More than 60 minutes were sp

## 2020-10-30 ENCOUNTER — APPOINTMENT (OUTPATIENT)
Dept: CT IMAGING | Facility: HOSPITAL | Age: 70
DRG: 085 | End: 2020-10-30
Attending: NURSE PRACTITIONER
Payer: MEDICARE

## 2020-10-30 PROCEDURE — 99233 SBSQ HOSP IP/OBS HIGH 50: CPT | Performed by: HOSPITALIST

## 2020-10-30 PROCEDURE — 70450 CT HEAD/BRAIN W/O DYE: CPT | Performed by: NURSE PRACTITIONER

## 2020-10-30 PROCEDURE — 99223 1ST HOSP IP/OBS HIGH 75: CPT | Performed by: OTHER

## 2020-10-30 RX ORDER — DIAZEPAM 5 MG/1
5 TABLET ORAL EVERY 6 HOURS PRN
Status: DISCONTINUED | OUTPATIENT
Start: 2020-10-30 | End: 2020-10-31

## 2020-10-30 NOTE — PROGRESS NOTES
[facility]    Progress Note    Carlos Solis Patient Status:  Inpatient    3/5/1950 MRN [id number]   Location [facility] 2W/SW Attending Kailee Ugalde MD   Hosp Day # 1 PCP Nancy Slater MD       Subjective:   Kimberly Weeks increased in density. There is mild increased right to left midline shift of 4 mm. 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       Dictated by (CST): Kavon Connell MD on 10/30/2020 at 8:06 AM     Finalized by (CST): Sravanthi Villafana neurosurgery on board  neurochecks    Alcohol withdrawal  On CIWA protocol  On thiamine and folic acid  CIWAs have been normal     Severe tinnitus  Neurology on board  Patient has seen a lot of different physicians  Workup of meneirres disease has been neg

## 2020-10-30 NOTE — CONSULTS
[facility]    PATIENT'S NAME: Gabino Shakila   ATTENDING PHYSICIAN: Kimmy Griffin MD   CONSULTING PHYSICIAN: Alessandro Parra MD   PATIENT ACCOUNT#:   [id number]    LOCATION:  [address] [place] [address] RECORD #:   [id number]       DATE OF BI his wife have been trying to clean up and sell their house so they could move to a house in [place] which his wife really wanted to have. He notes that when he first drank, he got rid of all the tinnitus completely.   He has not been in this sit couple previous office visits. The patient reports that his appetite is poor, and he eats twice a day. Certain foods would make the tinnitus worse, and he would have to restrict his diet even more. His weight has gone down somewhat.     Evidently, he h hallucinations, and denies suicidal or homicidal ideation. Insight and judgment are fair. Thought content and process are grossly within normal limits. Intellect and memory are about average, and assets include a desire to get well.   Liabilities include him.    Thank you for referring this patient to me.     Dictated By Jenaro Salamanca MD  d: 10/29/2020 21:01:12  t: 10/29/2020 21:40:36  Job [id number]  TPB/

## 2020-10-30 NOTE — PLAN OF CARE
Problem: Patient Centered Care  Goal: Patient preferences are identified and integrated in the patient's plan of care  Description: Interventions:  - What would you like us to know as we care for you? 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

## 2020-10-30 NOTE — PHYSICAL THERAPY NOTE
PHYSICAL THERAPY EVALUATION - INPATIENT     Room Number: 215/215-A  Evaluation Date: 10/30/2020  Type of Evaluation: Initial   Physician Order: PT Eval and Treat    Presenting Problem: subdural hematoma, subarachnoid hemorrhage  Reason for Therapy: [facility] been calculated based on documentation in the [facility] '6 clicks' Inpatient Basic Mobility Short Form. Research supports that patients with this level of impairment may benefit from home with home health PT and 24/7 family support.     Patient will benefit fr HOME SITUATION  Type of Home: House   Home Layout: One level  Stairs to Enter : 0     Lives With: Spouse     Patient Owned Equipment: Cane     Prior Level of [place]: mod I with cane    SUBJECTIVE  \"the tinnitus is the hardest thing to deal wit Modifier (G-Code): CK    FUNCTIONAL ABILITY STATUS  Gait Assessment   Gait Assistance: Minimum assistance  Distance (ft): 100  Assistive Device: Cane  Pattern: Shuffle  Stoop/Curb Assistance: Not tested       Bed Mobility: supine to sit, SBA    Transfers:

## 2020-10-30 NOTE — CM/SW NOTE
Received MDO for Advanced Directives and for Rehab upon discharge. Advanced Directives Met w/patient at the bedside to discuss. Pt states he has not filled out any HCPOA paperwork, but wife would be POA.   [place] wife is automatically t

## 2020-10-30 NOTE — CONSULTS
[facility]    Report of Consultation    Marilee Roland Patient Status:  Inpatient    3/5/1950 MRN [id number]   Location [facility] 2W/SW Attending Lashay Viera MD   Hosp Day # 1 PCP Leonce Seip, MD     Date of Admiss REPLACEMENT SURGERY     • TOTAL KNEE REPLACEMENT  2005, 2008     Has had 15 surgeries on Right Knee        Family History  Family History   Problem Relation Age of Onset   • Hypertension Maternal Grandmother    • Other (Other) Father         parkinson dise alprazolam 0.25 MG Oral Tab, Take 0.25 mg by mouth 2 (two) times daily. •  PARoxetine HCl 40 MG Oral Tab, Take 40 mg by mouth daily.     •  PEG 3350-KCl-NaBcb-NaCl-NaSulf (COLYTE WITH FLAVOR PACKS) 240 g Oral Recon Soln, Take prep as directed by gastro o bilateral symmetric  Brachioradialis 2 + bilateral symmetric  Patellar 1 bilateral symmetric  Ankle jerk 2+ bilateral symmetric    No clonus  No Babinski sign    Coordination:  Finger to nose tremulous and mildly ataxic bilaterally  Rapid alternating movem subarachnoid hemorrhage right parietal and left frontal regions near the convexity. Both sites are deep to areas of scalp contusion and laceration . 3. Moderate chronic left frontal subcortical infarct.  4. No calvarial fracture visualized   Dictated by (C

## 2020-10-30 NOTE — PROGRESS NOTES
Dr. Dejesus Risk in to see pt currently, will resume marcelino for fall risk safety after doctor's visit.

## 2020-10-30 NOTE — PLAN OF CARE
Problem: Patient Centered Care  Goal: Patient preferences are identified and integrated in the patient's plan of care  Description: Interventions:  - What would you like us to know as we care for you? 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 Tiffany Weir was inially frustrated with all of the cords and alarms entangling him. Discussed safety and use of call light, did not use the bed alarm overnight, instead attended to the call light promptly, and changes on the monitor.  Katie Trevino was annoyed

## 2020-10-30 NOTE — PROGRESS NOTES
Mr. Angela Murry is resting comfortably. He complains of a bifrontal headache, severe bilateral tinnitus, and a brief episode of dizziness. He feels about the same as yesterday. No nausea, vomiting, or vision changes.   He also reports a several decade histo

## 2020-10-31 PROCEDURE — 99233 SBSQ HOSP IP/OBS HIGH 50: CPT | Performed by: OTHER

## 2020-10-31 PROCEDURE — 99233 SBSQ HOSP IP/OBS HIGH 50: CPT | Performed by: HOSPITALIST

## 2020-10-31 RX ORDER — ALPRAZOLAM 0.5 MG/1
0.5 TABLET ORAL
Status: DISCONTINUED | OUTPATIENT
Start: 2020-10-31 | End: 2020-11-01

## 2020-10-31 RX ORDER — ALPRAZOLAM 0.5 MG/1
1.5 TABLET ORAL EVERY MORNING
Status: DISCONTINUED | OUTPATIENT
Start: 2020-10-31 | End: 2020-11-01

## 2020-10-31 NOTE — PLAN OF CARE
Problem: Patient Centered Care  Goal: Patient preferences are identified and integrated in the patient's plan of care  Description: Interventions:  - What would you like us to know as we care for you? 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 obtain 12 lead EKG if indicated  - Evaluate effectiveness of antiarrhythmic and heart rate control medications as ordered  - Initiate emergency measures for life threatening arrhythmias  - Monitor electrolytes and administer replacement therapy as ordered Progressing  Goal: Maintains adequate nutritional intake (undernourished)  Description: INTERVENTIONS:  - Monitor percentage of each meal consumed  - Identify factors contributing to decreased intake, treat as appropriate  - Assist with meals as needed  - ordered  - Monitor response to electrolyte replacements, including rhythm and repeat lab results as appropriate  - Fluid restriction as ordered  - Instruct patient on fluid and nutrition restrictions as appropriate  Outcome: Progressing  Goal: Hemodynamic stability  Description: INTERVENTIONS  - Assess for signs and symptoms of bleeding or hemorrhage  - Monitor labs and vital signs for trends  - Administer supportive blood products/factors, fluids and medications as ordered and appropriate  - Administer sup Assess for and report changes in neurological status  - Initiate measures to prevent increased intracranial pressure  - Maintain blood pressure and fluid volume within ordered parameters to optimize cerebral perfusion and minimize risk of hemorrhage  - Mon highest/safest level of independence in self care  Description: Interventions:  - Assess ability and encourage patient to participate in ADLs to maximize function  - Promote sitting position while performing ADLs such as feeding, grooming, and bathing  - E

## 2020-10-31 NOTE — PLAN OF CARE
Patient cooperative and pleasant; enjoys talking with staff; did not like how valium was working and satisfied after put back on xanax.   This RN included 2 doses of xanax tonight since he did not receive any today; patient received 1.5 mg and will receive decreased cardiac output  - Evaluate effectiveness of vasoactive medications to optimize hemodynamic stability  - Monitor arterial and/or venous puncture sites for bleeding and/or hematoma  - Assess quality of pulses, skin color and temperature  - Assess f tolerated, if ordered  - Obtain nutritional consult as needed  - Evaluate fluid balance  Outcome: Progressing  Goal: Maintains or returns to baseline bowel function  Description: INTERVENTIONS:  - Assess bowel function  - Maintain adequate hydration with I Glucose as ordered  - Assess for signs and symptoms of hyperglycemia and hypoglycemia  - Administer ordered medications to maintain glucose within target range  - Assess barriers to adequate nutritional intake and initiate nutrition consult as needed  - In Assess and document dressing/incision, wound bed, drain sites and surrounding tissue  - Implement wound care per orders  - Initiate isolation precautions as appropriate  - Initiate Pressure Ulcer prevention bundle as indicated  Outcome: Progressing  Goal: appropriate  - Communicate ordered activity level and limitations with patient/family  Outcome: Progressing  Goal: Maintain proper alignment of affected body part  Description: INTERVENTIONS:  - Support and protect limb and body alignment per provider's or PT/OT  - Encourage visually impaired, hearing impaired and aphasic patients to use assistive/communication devices  Outcome: Progressing     Problem: Impaired Cognition  Goal: Patient will exhibit improved attention, thought processing and/or memory  Descr

## 2020-10-31 NOTE — PROGRESS NOTES
Double RN skin check done prior to transfer off Unit. Skin check performed by this RN and Johnny Parents, PCT. Wounds are as follows: see charting, all charting complete and correct. All wounds from fall and open to air.      Will remain available for any fur

## 2020-10-31 NOTE — PLAN OF CARE
VSS. Pt is ok for transfer to 3rd floor. Neuro symptoms unchanged, pt complaints on blurred vision and left eye is sluggish (states he always had issues as a kid, has a lazy eye).  Xanax was discontinued per MD and taken off home med list. IV fluids discont decreased coronary artery perfusion - ex.  Angina  - Evaluate fluid balance, assess for edema, trend weights  Outcome: Progressing  Goal: Absence of cardiac arrhythmias or at baseline  Description: INTERVENTIONS:  - Continuous cardiac monitoring, monitor vi and tolerated  - Evaluate effectiveness of GI medications  - Encourage mobilization and activity  - Obtain nutritional consult as needed  - Establish a toileting routine/schedule  - Consider collaborating with pharmacy to review patient's medication profil management of diabetes  Outcome: Progressing  Goal: Electrolytes maintained within normal limits  Description: INTERVENTIONS:  - Monitor labs and rhythm and assess patient for signs and symptoms of electrolyte imbalances  - Administer electrolyte replaceme remain intact  Description: INTERVENTIONS  - Assess oral mucosa and hygiene practices  - Implement preventative oral hygiene regimen  - Implement oral medicated treatments as ordered  Outcome: Progressing     Problem: HEMATOLOGIC - ADULT  Goal: Maintains h reinforce with patient and family use of appropriate assistive device and precautions (e.g. spinal or hip dislocation precautions)  Outcome: Progressing     Problem: NEUROLOGICAL - ADULT  Goal: Achieves stable or improved neurological status  Description: functional ability and stability  - Promote increasing activity/tolerance for mobility and gait  - Educate and engage patient/family in tolerated activity level and precautions  Outcome: Progressing     Problem: Impaired Activities of Daily Living  Goal: A

## 2020-10-31 NOTE — PROGRESS NOTES
Handoff report given to receiving RN at bedside, all questions answered. Patient resting comfortably in bed, no acute distress noted. Patient sheets clean, dry. Call light within reach, three side rails up, bed is locked in lowest position.  Lines in g

## 2020-10-31 NOTE — PROGRESS NOTES
Per patient request, attending ok'd going back on xanax due to patient saying valium not effective; this RN put same orders back in prior to change to valium; patient taking 1.5 in the morning and .5mg before lunch. Informed patient of change.

## 2020-10-31 NOTE — BH PROGRESS NOTE
Chart reviewed, discussed with RN, interviewed pt with wife in the room the whole time. Pt is still feeling depressed and ashamed of the events leading him to be here. He said that his vision has been worse since the head injury.   His hearing is no

## 2020-10-31 NOTE — OCCUPATIONAL THERAPY NOTE
OCCUPATIONAL THERAPY EVALUATION - INPATIENT     Room Number: 215/215-A  Evaluation Date: 10/31/2020  Type of Evaluation: Initial  Presenting Problem: Subdural hematoma, Subarachnoid hemorrhage.      Physician Order: IP Consult to Occupational Therapy  Fernando RECOMMENDATIONS  OT Discharge Recommendations: 24 hour care/supervision;Home  OT Device Recommendations: Shower chair    PLAN  OT Treatment Plan: Balance activities; ADL training;Functional transfer training; Endurance training;Patient/Family education;Equip of need for assistance  Awareness of Deficits:  decreased awareness of deficits    VISION  Impaired from cataracts.        SENSATION  WFL for ADL    RANGE OF MOTION   Upper extremity ROM is within functional limits     STRENGTH ASSESSMENT  Upper extremity s will tolerate standing for 5 minutes in prep for adls with Mod I   Comment:    Patient will complete LE dressing Mod I  Comment:          Goals  on: 2020  Frequency:  5 x/week    Rosette De Los Santos MA, OTR/L  Occupational Therapist

## 2020-10-31 NOTE — PHYSICAL THERAPY NOTE
PHYSICAL THERAPY TREATMENT NOTE - INPATIENT     Room Number: 278/778-Q       Presenting Problem: subdural hematoma, subarachnoid hemorrhage    Problem List  Principal Problem:    Frequent falls  Active Problems:    Acute subdural hematoma ([facility]    Satinder Martinez Static Standing: Fair +  Dynamic [facility] '6-Clicks' INPATIENT SHORT FORM - BASIC MOBILITY  How much difficulty does the patient currently have. ..  -   Turning over in bed (including adjusting bedclothes, sheets and blankets)?: None Goal #5   Current Status    Goal #6    Goal #6  Current Status

## 2020-10-31 NOTE — PROGRESS NOTES
[facility]    Progress Note    Jayce [facility] Patient Status:  Inpatient    3/5/1950 MRN [id number]   Location [facility] 2W/SW Attending Mildred Valadez MD   Hosp Day # 2 PCP Elisabeth Martinez MD       Subjective:   Sia Morton left midline shift of 4 mm. 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       Dictated by (CST): oCurtney Carbone MD on 10/30/2020 at 8:06 AM     Finalized by (CST): Courtney Carbone MD on 10/30/2020 at 8:20 AM          Ct Brain acid  CIWAs have been normal     Severe tinnitus  Neurology on board  Patient has seen a lot of different physicians  Workup of meneirres disease has been negative in the past  Will start trial of valium- will increase dose since patient feels its not stro

## 2020-11-01 VITALS
RESPIRATION RATE: 16 BRPM | HEART RATE: 81 BPM | HEIGHT: 69 IN | BODY MASS INDEX: 22.07 KG/M2 | WEIGHT: 149 LBS | TEMPERATURE: 98 F | DIASTOLIC BLOOD PRESSURE: 73 MMHG | SYSTOLIC BLOOD PRESSURE: 121 MMHG | OXYGEN SATURATION: 93 %

## 2020-11-01 PROCEDURE — 99239 HOSP IP/OBS DSCHRG MGMT >30: CPT | Performed by: HOSPITALIST

## 2020-11-01 RX ORDER — LEVETIRACETAM 500 MG/1
500 TABLET ORAL 2 TIMES DAILY
Qty: 120 TABLET | Refills: 0 | Status: SHIPPED | OUTPATIENT
Start: 2020-11-01 | End: 2021-02-23

## 2020-11-01 RX ORDER — ATORVASTATIN CALCIUM 20 MG/1
20 TABLET, FILM COATED ORAL NIGHTLY
Qty: 30 TABLET | Refills: 0 | Status: SHIPPED | OUTPATIENT
Start: 2020-11-01 | End: 2021-02-22

## 2020-11-01 RX ORDER — PAROXETINE HYDROCHLORIDE 40 MG/1
40 TABLET, FILM COATED ORAL EVERY MORNING
Qty: 30 TABLET | Refills: 0 | Status: SHIPPED | OUTPATIENT
Start: 2020-11-01 | End: 2021-08-12

## 2020-11-01 RX ORDER — ALPRAZOLAM 0.5 MG/1
1.5 TABLET ORAL EVERY MORNING
Status: DISCONTINUED | OUTPATIENT
Start: 2020-11-02 | End: 2020-11-01

## 2020-11-01 NOTE — DISCHARGE SUMMARY
[facility]    Discharge Summary    Eli Ugalde Patient Status:  Inpatient    3/5/1950 MRN [id number]   Location [facility] 3W/SW Attending Yanni Saldivar MD   Hosp Day # 3 PCP Silva Schaefer MD     Date of Admission: falls  Secondary to severe deconditioning  Patient wants to go home today- will discharge      Major depression  On paroxetine  Psych on board   Will follow up with Dr. Jada Hernandez        Acute subdural hematoma (HCC)/ Subarachnoid hemorrhage ([facility]  MRI of t PARoxetine HCl 40 MG Tabs  Commonly known as: PAXIL  What changed: when to take this      Take 1 tablet (40 mg total) by mouth every morning.    Quantity: 30 tablet  Refills: 0        STOP taking these medications    HYDROcodone-acetaminophen 5-325 MG Tab

## 2020-11-01 NOTE — PROGRESS NOTES
This RN was reconciling the medication for discharge and accidentally stopped home medications that ended up showing \"stop taking\" on the AVS; so I \"prescribed it again\" so it would show up on the AVS.  Discharge instructions were reviewed with patient

## 2020-11-01 NOTE — PROGRESS NOTES
Patient walked with this RN half-way around unit's hallway, continued the remainder of his walk with his wife; patient steady on his feet; aware of his surroundings and abilities; stated he just feels a little weak but steady; told patient alarm would not

## 2020-11-01 NOTE — CM/SW NOTE
SW informed by pt RN that pt is stable for discharge. SW discussed [facility] vs SNF. RN states that latest PT documentation recommendation is for [facility] and pt is agreeable.   SW cancelled SNF referral and made reservation to the following [facility] agency:    [facility] at [facility]

## 2020-11-01 NOTE — PROGRESS NOTES
This RN modified time morning xanax dose is given due to time not being adjusted yesterday prior to end of shift after speaking with attending.   Xanax was restarted as how it was being taken prior to trying valium; patient would like it at 5am every mornin

## 2020-11-01 NOTE — PROGRESS NOTES
[facility]    Progress Note    Jackson Favorite Patient Status:  Inpatient    3/5/1950 MRN [id number]   Location [facility] 3W/SW Attending Can Mclean MD   Hosp Day # 2 PCP Lisa Carpenter MD       Subjective:   Jimmy Lloyd wheezing or cough   CARDIOVASCULAR: denies chest pain or SINGLETON; no palpitations   GI: denies nausea, vomiting, constipation, diarrhea; no heartburn  GENITAL/: no dysuria, urgency or frequency; no nocturia  MUSCULOSKELETAL: no joint complaints upper or lowe

## 2020-11-01 NOTE — PLAN OF CARE
Anxious last night about the xanax not given on time as he wants it. Explanations and emotional support provided. Was calm and cooperative after TL spoke with him.  Early dose of xanax given at 05 am instead of 0930 am.     Problem: Patient Centered Care  G optimal ventilation and oxygenation  Description: INTERVENTIONS:  - Assess for changes in respiratory status  - Assess for changes in mentation and behavior  - Position to facilitate oxygenation and minimize respiratory effort  - Oxygen supplementation bas needed  - Optimize oral hygiene and moisture  - Encourage food from home; allow for food preferences  - Enhance eating environment  Outcome: Progressing  Goal: Achieves appropriate nutritional intake (bariatric)  Description: INTERVENTIONS:  - Monitor for INTERVENTIONS:  - Monitor labs and assess for signs and symptoms of volume excess or deficit  - Monitor intake, output and patient weight  - Monitor urine specific gravity, serum osmolarity and serum sodium as indicated or ordered  - Monitor response to in appropriate  Outcome: Progressing  Goal: Free from bleeding injury  Description: (Example usage: patient with low platelets)  INTERVENTIONS:  - Avoid intramuscular injections, enemas and rectal medication administration  - Ensure safe mobilization of patie Initiate appropriate interventions as ordered  Outcome: Progressing  Goal: Absence of seizures  Description: INTERVENTIONS  - Monitor for seizure activity  - Administer anti-seizure medications as ordered  - Monitor neurological status  Outcome: Progressin functional activity level and precautions during self-care    Outcome: Progressing     Problem: Impaired Communication  Goal: Patient will achieve maximal communication potential  Description: Interventions:    Outcome: Progressing

## 2020-11-01 NOTE — CM/SW NOTE
11/01/20 1200   Discharge disposition   Expected discharge disposition Home-Health   Name of Kayode Boogie [address]  ([facility] at [facility])   Discharge transportation Private car     SW informed [facility] agency of pt d/c.     Edie Messina, DON, L

## 2020-11-01 NOTE — PLAN OF CARE
This RN sat with patient this morning; explained there is going to be adjustments and things to work through over time physically, emotionally and cognitively regarding his state of health.   Patient very agitated regarding incident not getting xanax last n additional Care Plan goals for specific interventions  Outcome: Progressing     Problem: CARDIOVASCULAR - ADULT  Goal: Maintains optimal cardiac output and hemodynamic stability  Description: INTERVENTIONS:  - Monitor vital signs, rhythm, and trends  - Mon INTERVENTIONS:  - Assess patient’s ability to void and empty bladder  - Monitor intake/output and perform bladder scan as needed  - Follow urinary retention protocol/standard of care  - Consider collaborating with pharmacy to review patient's medication pr function  Description: INTERVENTIONS:  - Assess patient stability and activity tolerance for standing, transferring and ambulating w/ or w/o assistive devices  - Assist with transfers and ambulation using safe patient handling equipment as needed  - Ensure activity  - Instruct patient/family to call for assistance with activity based on assessment  Outcome: Progressing  Goal: Achieves maximal functionality and self care  Description: INTERVENTIONS  - Monitor swallowing and airway patency with patient fatigue

## 2020-11-02 ENCOUNTER — TELEPHONE (OUTPATIENT)
Dept: INTERNAL MEDICINE CLINIC | Facility: CLINIC | Age: 70
End: 2020-11-02

## 2020-11-02 NOTE — TELEPHONE ENCOUNTER
Amara Payne from [facility] is requesting doctor to follow patient for home health.  Patient was discharged yesterday from [facility].

## 2020-11-04 ENCOUNTER — MED REC SCAN ONLY (OUTPATIENT)
Dept: INTERNAL MEDICINE CLINIC | Facility: CLINIC | Age: 70
End: 2020-11-04

## 2020-11-05 ENCOUNTER — TELEPHONE (OUTPATIENT)
Dept: INTERNAL MEDICINE CLINIC | Facility: CLINIC | Age: 70
End: 2020-11-05

## 2020-11-05 NOTE — TELEPHONE ENCOUNTER
Herrera Henderson RN from [email or web address] states patient was informed to go to ER to have sean removed. Herrera Henderson states she can remove them herself on Monday at patient's home.

## 2020-11-09 ENCOUNTER — TELEPHONE (OUTPATIENT)
Dept: INTERNAL MEDICINE CLINIC | Facility: CLINIC | Age: 70
End: 2020-11-09

## 2020-11-09 NOTE — TELEPHONE ENCOUNTER
Spoke with patient and relayed your message. He was not happy with the response. Per patient, \"Benadryl and melatonin are for normal people who cannot sleep, not those with brain bleeds and a concussion. \" Wanted to speak to you directly.  Offered a teleph

## 2020-11-09 NOTE — TELEPHONE ENCOUNTER
Dr. Merline Rather:  Leslie Valladares called to report:  Asking if you would send prescription to help him sleep. Patient has been c/o of lack of sleep due to issues with tinnitus. Patient unable to sleep all week. Patient reports his tinnitus is \"bad\".

## 2020-11-09 NOTE — TELEPHONE ENCOUNTER
I am hesitant to use prescription sleep medication in this patient with his problems. I would recommend using Benadryl 25 mg over-the-counter or try melatonin over-the-counter before considering other treatments for insomnia.

## 2020-11-09 NOTE — TELEPHONE ENCOUNTER
Vita Lyons a physical therapist from [facility] is requesting verbal approval for physical therapy orders. Plan of care is for the patient to be seen one time a week for 4 weeks and one time a week every other week for 4 weeks.

## 2020-11-09 NOTE — TELEPHONE ENCOUNTER
I give this approval for home physical therapy under the number of therapy sessions that were recommended in the note.

## 2020-11-10 ENCOUNTER — TELEPHONE (OUTPATIENT)
Dept: INTERNAL MEDICINE CLINIC | Facility: CLINIC | Age: 70
End: 2020-11-10

## 2020-11-10 NOTE — TELEPHONE ENCOUNTER
[place]/ Nurse of [facility] states patient has not been sleeping lately. Nurse states patient has a CT Scan on the 12th and is requesting that Dr. Eunice Kelley send anything to assist patient with sleep.

## 2020-11-11 ENCOUNTER — TELEPHONE (OUTPATIENT)
Dept: INTERNAL MEDICINE CLINIC | Facility: CLINIC | Age: 70
End: 2020-11-11

## 2020-11-11 RX ORDER — TEMAZEPAM 7.5 MG/1
7.5 CAPSULE ORAL NIGHTLY PRN
Qty: 20 CAPSULE | Refills: 0 | Status: SHIPPED | OUTPATIENT
Start: 2020-11-11 | End: 2021-02-23

## 2020-11-11 NOTE — TELEPHONE ENCOUNTER
A prescription for temazepam has been sent to the patient's pharmacy.   He should use it on an as-needed basis but will not be regularly refilled

## 2020-11-12 ENCOUNTER — HOSPITAL ENCOUNTER (OUTPATIENT)
Dept: CT IMAGING | Facility: HOSPITAL | Age: 70
Discharge: HOME | End: 2020-11-12
Attending: HOSPITALIST
Payer: MEDICARE

## 2020-11-12 ENCOUNTER — TELEPHONE (OUTPATIENT)
Dept: CARDIOLOGY CLINIC | Facility: CLINIC | Age: 70
End: 2020-11-12

## 2020-11-12 ENCOUNTER — PATIENT MESSAGE (OUTPATIENT)
Dept: INTERNAL MEDICINE CLINIC | Facility: CLINIC | Age: 70
End: 2020-11-12

## 2020-11-12 DIAGNOSIS — R29.6 FREQUENT FALLS: ICD-10-CM

## 2020-11-12 PROCEDURE — 70450 CT HEAD/BRAIN W/O DYE: CPT | Performed by: HOSPITALIST

## 2020-11-12 NOTE — TELEPHONE ENCOUNTER
Per pharmacy fax insurance does not cover med below please call plan to initiate prior auth [id number] pt id# [id number]    Current Outpatient Medications:   •  temazepam 7.5 MG Oral Cap, Take 1 capsule (7.5 mg total) by mouth nightly as needed for Sleep.

## 2020-11-12 NOTE — TELEPHONE ENCOUNTER
Prior authorization for Temazepam completed w/ [employer] on cover my meds Key: [id number], turn around time 1-5 days.

## 2020-11-13 ENCOUNTER — TELEPHONE (OUTPATIENT)
Dept: NEUROLOGY | Facility: CLINIC | Age: 70
End: 2020-11-13

## 2020-11-13 ENCOUNTER — TELEPHONE (OUTPATIENT)
Dept: INTERNAL MEDICINE CLINIC | Facility: CLINIC | Age: 70
End: 2020-11-13

## 2020-11-13 NOTE — TELEPHONE ENCOUNTER
I called the patient immediately after receiving the report from the radiologist.  I instructed the patient to also call and follow-up with the neurologist he saw in the hospital.  I was somewhat relieved to hear that the patient had not fallen again and w

## 2020-11-13 NOTE — TELEPHONE ENCOUNTER
Pt has an appt with you on 11/19/20. Brain CT done yesterday was ordered by Dr. Dashawn Cabrera:   1.  Small 4 mm acute right anterior parafalcine subdural hematoma, which measures up to 4 mm in maximal diameter and appears new since 10/30/202

## 2020-11-13 NOTE — TELEPHONE ENCOUNTER
From: Sarthak Gastelum  To: Go Fernandez MD  Sent: 11/12/2020 5:11 PM CST  Subject: Test Results Question    I am Juanice Peers. I understand that my  may have a new brain bleed, and I am quite distressed because he has not fallen again.  Am

## 2020-11-13 NOTE — TELEPHONE ENCOUNTER
Routed to neuro staff to please assist with appt as requested by PCP.     Per hospital discharge notes:  Consultants      Provider Role Specialty     Gissell Rodas MD Consulting Physician  David Crenshaw MD Consulting Physician  [facility]SS[facility]

## 2020-11-13 NOTE — TELEPHONE ENCOUNTER
Regarding: RE: Test Results Question  Contact: [phone]  ----- Message from Natty Christensen MD sent at 11/13/2020 10:58 AM CST -----  Sterling Guerrero MD    10:58 AM  Note     I called the patient immediately after receiving the report from the

## 2020-11-13 NOTE — TELEPHONE ENCOUNTER
Please see telephone encounter created. Message has been routed to neuro for appt assistance as requested by Dr. Yumiko Dias.

## 2020-11-14 NOTE — TELEPHONE ENCOUNTER
I discussed the results of the CT scan of the head this evening with the wife. She states he has no new symptoms.   I told the wife if he develops any new symptoms, headache, blurred vision, trouble walking, weakness in arm or leg, lethargy, change in thin

## 2020-11-16 ENCOUNTER — MED REC SCAN ONLY (OUTPATIENT)
Dept: INTERNAL MEDICINE CLINIC | Facility: CLINIC | Age: 70
End: 2020-11-16

## 2020-11-16 NOTE — TELEPHONE ENCOUNTER
Patient's wife calling and left voicemail on 's line. stating that the patient had another CT head. Would like to discuss with . Per patient's chart, she spoke with Dr. Carmen Patel on 11/13. Called and spoke with patient's wife.  Sta

## 2020-11-21 NOTE — TELEPHONE ENCOUNTER
Per chart review, neuro spoke to wife regarding CT scan on 11/13/2020. Future appt has been scheduled by neuro staff.     Future Appointments   Date Time Provider Baylee Maria   1/13/2021  2:30 PM Amish Flores MD [facility]

## 2020-11-24 ENCOUNTER — TELEPHONE (OUTPATIENT)
Dept: NEUROLOGY | Facility: CLINIC | Age: 70
End: 2020-11-24

## 2020-11-24 ENCOUNTER — LAB ENCOUNTER (OUTPATIENT)
Dept: LAB | Facility: HOSPITAL | Age: 70
End: 2020-11-24
Attending: Other
Payer: MEDICARE

## 2020-11-24 ENCOUNTER — OFFICE VISIT (OUTPATIENT)
Dept: NEUROLOGY | Facility: CLINIC | Age: 70
End: 2020-11-24
Payer: MEDICARE

## 2020-11-24 VITALS
WEIGHT: 153 LBS | SYSTOLIC BLOOD PRESSURE: 118 MMHG | BODY MASS INDEX: 22.66 KG/M2 | DIASTOLIC BLOOD PRESSURE: 64 MMHG | HEIGHT: 69 IN

## 2020-11-24 DIAGNOSIS — H93.13 TINNITUS OF BOTH EARS: ICD-10-CM

## 2020-11-24 DIAGNOSIS — G62.9 POLYNEUROPATHY: ICD-10-CM

## 2020-11-24 DIAGNOSIS — S06.5X9A SUBDURAL HEMATOMA (HCC): Primary | ICD-10-CM

## 2020-11-24 PROCEDURE — 86334 IMMUNOFIX E-PHORESIS SERUM: CPT

## 2020-11-24 PROCEDURE — 99214 OFFICE O/P EST MOD 30 MIN: CPT | Performed by: OTHER

## 2020-11-24 PROCEDURE — 83516 IMMUNOASSAY NONANTIBODY: CPT

## 2020-11-24 PROCEDURE — 36415 COLL VENOUS BLD VENIPUNCTURE: CPT

## 2020-11-24 PROCEDURE — 1111F DSCHRG MED/CURRENT MED MERGE: CPT | Performed by: OTHER

## 2020-11-24 PROCEDURE — 86038 ANTINUCLEAR ANTIBODIES: CPT

## 2020-11-24 PROCEDURE — 86256 FLUORESCENT ANTIBODY TITER: CPT

## 2020-11-24 PROCEDURE — 84165 PROTEIN E-PHORESIS SERUM: CPT

## 2020-11-24 PROCEDURE — 82784 ASSAY IGA/IGD/IGG/IGM EACH: CPT

## 2020-11-24 PROCEDURE — 82607 VITAMIN B-12: CPT | Performed by: OTHER

## 2020-11-24 RX ORDER — ALPRAZOLAM 0.25 MG/1
0.25 TABLET ORAL 4 TIMES DAILY PRN
COMMUNITY
End: 2021-08-12

## 2020-11-24 NOTE — PROGRESS NOTES
Neurology Follow up Visit     Referred By: Dr. Brush ref. provider found    Chief Complaint: Patient presents with: Other: Tinnitus bilateral ears for approximately 6 years. Pt states is much worse. Review CT scan from 11- reviews with Dr, Aminta Kocher. subarachnoid hemorrhages. No surgeries were indicated. CT of the head was repeated in November 2020, small possible additional peripheral signs subdural hematoma noted.   No new symptoms however except of extremely severe tinnitus noted as reported in Nov 11/24/2020 ), Disp: 20 capsule, Rfl: 0    No Known Allergies    ROS:   As in HPI, the rest of the 14 system review was done and was negative      Physical Exam:   11/24/20  1104   BP: 118/64   Weight: 153 lb (69.4 kg)   Height: 69\"       General: No appar establish improvement.  - CT BRAIN OR HEAD (40363); Future    2. Tinnitus of both ears  We discussed that no specific neurological treatment available for that. I will refer him to ENT as well as neurotology for an opinion.   We discussed potential anticon

## 2020-11-24 NOTE — TELEPHONE ENCOUNTER
Medicare Online for insurance coverage of CT brain wo cpt code 69281. Insurance was verified and procedure is a covered benefit. Authorization is not required. Will call Pt. to inform. L/m advising authorization is not required.  Can proceed with scheduling

## 2020-11-30 ENCOUNTER — TELEPHONE (OUTPATIENT)
Dept: NEUROLOGY | Facility: CLINIC | Age: 70
End: 2020-11-30

## 2020-11-30 NOTE — TELEPHONE ENCOUNTER
----- Message from Eliana Joseph MD sent at 11/30/2020 11:33 AM CST -----  Please let the patient know that results of these particular lab tests so far were normal.    Thank you

## 2020-12-07 ENCOUNTER — MED REC SCAN ONLY (OUTPATIENT)
Dept: INTERNAL MEDICINE CLINIC | Facility: CLINIC | Age: 70
End: 2020-12-07

## 2020-12-08 ENCOUNTER — TELEPHONE (OUTPATIENT)
Dept: INTERNAL MEDICINE CLINIC | Facility: CLINIC | Age: 70
End: 2020-12-08

## 2020-12-08 NOTE — TELEPHONE ENCOUNTER
[facility] calling regarding this patient, they have two outstanding orders from over a month ago for this patient. They are waiting for interim order for [facility] PT to eval and treat for management of disease processes. This was dated 11/2 and they faxed it over to us. This is order [id number] under 11/4 med rec scan, scanned in on 11/6    They are also waiting for order [id number], this is the plan of care and needs to be signed. This is 3 pages and says [facility] Certification and Plan of Care\" on the top.     I spoke with [address] [place] [address],[address]       Fax: [phone]    Phone: [phone] - general line, confidential voicemail

## 2020-12-15 ENCOUNTER — HOSPITAL ENCOUNTER (OUTPATIENT)
Dept: CT IMAGING | Age: 70
Discharge: HOME | End: 2020-12-15
Attending: Other
Payer: MEDICARE

## 2020-12-15 ENCOUNTER — TELEPHONE (OUTPATIENT)
Dept: NEUROLOGY | Facility: CLINIC | Age: 70
End: 2020-12-15

## 2020-12-15 DIAGNOSIS — S06.5X9A SUBDURAL HEMATOMA (HCC): ICD-10-CM

## 2020-12-15 PROCEDURE — 70450 CT HEAD/BRAIN W/O DYE: CPT | Performed by: OTHER

## 2020-12-15 NOTE — TELEPHONE ENCOUNTER
----- Message from Momo Torres MD sent at 12/15/2020  3:22 PM CST -----  Please let patient know that CT of the head showed completely resolved hemorrhage. Elroy Jaramillo

## 2020-12-16 NOTE — TELEPHONE ENCOUNTER
Spoke to patient's wife and notified her of below. She was understanding and very thankful for the call.

## 2020-12-17 ENCOUNTER — TELEPHONE (OUTPATIENT)
Dept: INTERNAL MEDICINE CLINIC | Facility: CLINIC | Age: 70
End: 2020-12-17

## 2020-12-17 NOTE — TELEPHONE ENCOUNTER
Dr Elyssa Blake:  PT called asking for verbal ok to move his PT session to next week. PT states patient cancelled appt today because he wasn't feeling well due to tinnitus.

## 2020-12-30 ENCOUNTER — TELEPHONE (OUTPATIENT)
Dept: INTERNAL MEDICINE CLINIC | Facility: CLINIC | Age: 70
End: 2020-12-30

## 2020-12-30 ENCOUNTER — APPOINTMENT (OUTPATIENT)
Dept: CT IMAGING | Facility: HOSPITAL | Age: 70
End: 2020-12-30
Attending: EMERGENCY MEDICINE
Payer: MEDICARE

## 2020-12-30 ENCOUNTER — APPOINTMENT (OUTPATIENT)
Dept: GENERAL RADIOLOGY | Facility: HOSPITAL | Age: 70
End: 2020-12-30
Attending: EMERGENCY MEDICINE
Payer: MEDICARE

## 2020-12-30 ENCOUNTER — HOSPITAL ENCOUNTER (EMERGENCY)
Facility: HOSPITAL | Age: 70
Discharge: LEFT BEFORE DISCHARGE | End: 2020-12-30
Attending: EMERGENCY MEDICINE
Payer: MEDICARE

## 2020-12-30 VITALS
HEIGHT: 68 IN | BODY MASS INDEX: 23.34 KG/M2 | HEART RATE: 72 BPM | OXYGEN SATURATION: 95 % | SYSTOLIC BLOOD PRESSURE: 114 MMHG | DIASTOLIC BLOOD PRESSURE: 73 MMHG | TEMPERATURE: 98 F | WEIGHT: 154 LBS | RESPIRATION RATE: 18 BRPM

## 2020-12-30 DIAGNOSIS — G93.40 ENCEPHALOPATHY ACUTE: ICD-10-CM

## 2020-12-30 DIAGNOSIS — R53.1 WEAKNESS GENERALIZED: Primary | ICD-10-CM

## 2020-12-30 PROBLEM — R41.82 ALTERED MENTAL STATUS: Status: ACTIVE | Noted: 2020-12-30

## 2020-12-30 PROCEDURE — 80307 DRUG TEST PRSMV CHEM ANLYZR: CPT | Performed by: EMERGENCY MEDICINE

## 2020-12-30 PROCEDURE — 83690 ASSAY OF LIPASE: CPT | Performed by: EMERGENCY MEDICINE

## 2020-12-30 PROCEDURE — 96365 THER/PROPH/DIAG IV INF INIT: CPT

## 2020-12-30 PROCEDURE — 80076 HEPATIC FUNCTION PANEL: CPT | Performed by: EMERGENCY MEDICINE

## 2020-12-30 PROCEDURE — 81003 URINALYSIS AUTO W/O SCOPE: CPT | Performed by: EMERGENCY MEDICINE

## 2020-12-30 PROCEDURE — 82140 ASSAY OF AMMONIA: CPT | Performed by: EMERGENCY MEDICINE

## 2020-12-30 PROCEDURE — 85025 COMPLETE CBC W/AUTO DIFF WBC: CPT | Performed by: EMERGENCY MEDICINE

## 2020-12-30 PROCEDURE — 71045 X-RAY EXAM CHEST 1 VIEW: CPT | Performed by: EMERGENCY MEDICINE

## 2020-12-30 PROCEDURE — 96366 THER/PROPH/DIAG IV INF ADDON: CPT

## 2020-12-30 PROCEDURE — 70450 CT HEAD/BRAIN W/O DYE: CPT | Performed by: EMERGENCY MEDICINE

## 2020-12-30 PROCEDURE — 80048 BASIC METABOLIC PNL TOTAL CA: CPT | Performed by: EMERGENCY MEDICINE

## 2020-12-30 PROCEDURE — 80320 DRUG SCREEN QUANTALCOHOLS: CPT | Performed by: EMERGENCY MEDICINE

## 2020-12-30 PROCEDURE — 99284 EMERGENCY DEPT VISIT MOD MDM: CPT

## 2020-12-30 NOTE — TELEPHONE ENCOUNTER
Spoke with spouse Francie Ochoa ( and HALEY verified)--tearful--\"I don't know what to do anymore. I am at the end of my rope. I am afraid to sleep anymore--all he does is drink. I heard a thump 2, 2 1/2 weeks ago-I think he fell and blacked out.  Every day is

## 2020-12-30 NOTE — ED INITIAL ASSESSMENT (HPI)
Pt states he fell two weeks and hit head and has been getting more confused. Pt hx of brain bleed. Pt a/o x4. Patient wife states he does not know the front door from the back door. Patient reports weakness and fatigue.

## 2020-12-31 NOTE — ED NOTES
Reviewed discharge information with patient and patient;s wife. Patient and spouse verbalized understanding, no further questions or complaints at this time. Patient is alert and orientated x4, in no apparent distress,  Wheelchair assistance to lobby.  IVs

## 2020-12-31 NOTE — ED PROVIDER NOTES
Patient Seen in: [facility] Emergency Department      History   Patient presents with:  Altered Mental Status    Stated Complaint: confusion and getting worse    HPI    Wife brings patient to the emergency department today due to increased confusi noted in HPI. Constitutional and vital signs reviewed. All other systems reviewed and negative except as noted above.     Physical Exam     ED Triage Vitals [12/30/20 1736]   /68   Pulse 79   Resp 18   Temp 98.1 °F (36.7 °C)   Temp src Temporal Thought Content: Thought content does not include homicidal or suicidal plan.                ED Course     Labs Reviewed   BASIC METABOLIC PANEL (8) - Abnormal; Notable for the following components:       Result Value    Calculated Osmolality 298 (*)     Al (CST): Aura Peterson MD on 12/30/2020 at 6:58 PM     Finalized by (CST): Aura Peterson MD on 12/30/2020 at 7:07 PM          Xr Chest Ap Portable  (cpt=71045)    Result Date: 12/30/2020  CONCLUSION: No acute disease.      Dictated by (CST): Kai Carpenter [id number]  [phone]    Schedule an appointment as soon as possible for a visit      We recommend that you schedule follow up care with a primary care provider within the next three months to obtain basic health screening including reassessment of your bloo

## 2020-12-31 NOTE — ED NOTES
When speaking with patient in regards to suicide screening questions pt does admit to having though of harming himself in the past related to the pain and discomfort from his tinnitus. Pt denies any active plans or thoughts as well as any suicidal intent.

## 2020-12-31 NOTE — ED NOTES
Assumed care of patient from Volodymyr Higgins at this time. Wife at bedside, patient and wife updated on plan of care- awaiting labs and dispo from ER MD. All needs met at this time, banana bag still infusing. Patient is AOX4, just forgetful.

## 2021-01-02 ENCOUNTER — APPOINTMENT (OUTPATIENT)
Dept: GENERAL RADIOLOGY | Facility: HOSPITAL | Age: 71
End: 2021-01-02
Attending: EMERGENCY MEDICINE
Payer: MEDICARE

## 2021-01-02 ENCOUNTER — APPOINTMENT (OUTPATIENT)
Dept: CT IMAGING | Facility: HOSPITAL | Age: 71
End: 2021-01-02
Attending: EMERGENCY MEDICINE
Payer: MEDICARE

## 2021-01-02 ENCOUNTER — HOSPITAL ENCOUNTER (EMERGENCY)
Facility: HOSPITAL | Age: 71
Discharge: HOME | End: 2021-01-02
Attending: EMERGENCY MEDICINE
Payer: MEDICARE

## 2021-01-02 VITALS
SYSTOLIC BLOOD PRESSURE: 122 MMHG | DIASTOLIC BLOOD PRESSURE: 78 MMHG | TEMPERATURE: 100 F | HEIGHT: 69 IN | OXYGEN SATURATION: 98 % | BODY MASS INDEX: 22.07 KG/M2 | WEIGHT: 149 LBS | RESPIRATION RATE: 16 BRPM | HEART RATE: 80 BPM

## 2021-01-02 DIAGNOSIS — S22.41XA CLOSED FRACTURE OF MULTIPLE RIBS OF RIGHT SIDE, INITIAL ENCOUNTER: Primary | ICD-10-CM

## 2021-01-02 LAB
ANION GAP SERPL CALC-SCNC: 6 MMOL/L (ref 0–18)
BACTERIA UR QL AUTO: NEGATIVE /HPF
BASOPHILS # BLD AUTO: 0.02 X10(3) UL (ref 0–0.2)
BASOPHILS NFR BLD AUTO: 0.3 %
BILIRUB UR QL: NEGATIVE
BUN BLD-MCNC: 15 MG/DL (ref 7–18)
BUN/CREAT SERPL: 18.8 (ref 10–20)
CALCIUM BLD-MCNC: 9.5 MG/DL (ref 8.5–10.1)
CHLORIDE SERPL-SCNC: 107 MMOL/L (ref 98–112)
CLARITY UR: CLEAR
CO2 SERPL-SCNC: 29 MMOL/L (ref 21–32)
COLOR UR: YELLOW
CREAT BLD-MCNC: 0.8 MG/DL
DEPRECATED RDW RBC AUTO: 53.5 FL (ref 35.1–46.3)
EOSINOPHIL # BLD AUTO: 0 X10(3) UL (ref 0–0.7)
EOSINOPHIL NFR BLD AUTO: 0 %
ERYTHROCYTE [DISTWIDTH] IN BLOOD BY AUTOMATED COUNT: 14.7 % (ref 11–15)
GLUCOSE BLD-MCNC: 107 MG/DL (ref 70–99)
GLUCOSE UR-MCNC: NEGATIVE MG/DL
HCT VFR BLD AUTO: 42.2 %
HGB BLD-MCNC: 13.6 G/DL
IMM GRANULOCYTES # BLD AUTO: 0.02 X10(3) UL (ref 0–1)
IMM GRANULOCYTES NFR BLD: 0.3 %
LEUKOCYTE ESTERASE UR QL STRIP.AUTO: NEGATIVE
LYMPHOCYTES # BLD AUTO: 0.8 X10(3) UL (ref 1–4)
LYMPHOCYTES NFR BLD AUTO: 10.1 %
MCH RBC QN AUTO: 31.8 PG (ref 26–34)
MCHC RBC AUTO-ENTMCNC: 32.2 G/DL (ref 31–37)
MCV RBC AUTO: 98.6 FL
MONOCYTES # BLD AUTO: 0.76 X10(3) UL (ref 0.1–1)
MONOCYTES NFR BLD AUTO: 9.6 %
NEUTROPHILS # BLD AUTO: 6.33 X10 (3) UL (ref 1.5–7.7)
NEUTROPHILS # BLD AUTO: 6.33 X10(3) UL (ref 1.5–7.7)
NEUTROPHILS NFR BLD AUTO: 79.7 %
NITRITE UR QL STRIP.AUTO: NEGATIVE
OSMOLALITY SERPL CALC.SUM OF ELEC: 295 MOSM/KG (ref 275–295)
PH UR: 6 [PH] (ref 5–8)
PLATELET # BLD AUTO: 173 10(3)UL (ref 150–450)
POTASSIUM SERPL-SCNC: 3.9 MMOL/L (ref 3.5–5.1)
PROT UR-MCNC: 30 MG/DL
RBC # BLD AUTO: 4.28 X10(6)UL
RBC #/AREA URNS AUTO: 4 /HPF
SODIUM SERPL-SCNC: 142 MMOL/L (ref 136–145)
SP GR UR STRIP: 1.03 (ref 1–1.03)
UROBILINOGEN UR STRIP-ACNC: <2
WBC # BLD AUTO: 7.9 X10(3) UL (ref 4–11)
WBC #/AREA URNS AUTO: <1 /HPF

## 2021-01-02 PROCEDURE — 71101 X-RAY EXAM UNILAT RIBS/CHEST: CPT | Performed by: EMERGENCY MEDICINE

## 2021-01-02 PROCEDURE — 85025 COMPLETE CBC W/AUTO DIFF WBC: CPT | Performed by: EMERGENCY MEDICINE

## 2021-01-02 PROCEDURE — 36415 COLL VENOUS BLD VENIPUNCTURE: CPT

## 2021-01-02 PROCEDURE — 81001 URINALYSIS AUTO W/SCOPE: CPT | Performed by: EMERGENCY MEDICINE

## 2021-01-02 PROCEDURE — 99284 EMERGENCY DEPT VISIT MOD MDM: CPT

## 2021-01-02 PROCEDURE — 80048 BASIC METABOLIC PNL TOTAL CA: CPT | Performed by: EMERGENCY MEDICINE

## 2021-01-02 RX ORDER — HYDROCODONE BITARTRATE AND ACETAMINOPHEN 5; 325 MG/1; MG/1
1 TABLET ORAL EVERY 6 HOURS PRN
Qty: 15 TABLET | Refills: 0 | Status: SHIPPED | OUTPATIENT
Start: 2021-01-02 | End: 2021-01-09

## 2021-01-02 RX ORDER — ACETAMINOPHEN 500 MG
500 TABLET ORAL ONCE
Status: DISCONTINUED | OUTPATIENT
Start: 2021-01-02 | End: 2021-01-02

## 2021-01-02 NOTE — ED PROVIDER NOTES
Patient Seen in: [facility] AND [facility] Emergency Department    History   Patient presents with:  Fall    Stated Complaint: Fall    HPI    Patient complains of mechanical fall that occurred  Couple days ago.   Has gait instability since recent brain bleed 2 mo frequency: Weekly      Comment: 4-5 shots/day    Drug use: No      Review of Systems    Positive for stated complaint: Fall  Other systems are as noted in HPI. Constitutional and vital signs reviewed.       All other systems reviewed and negative except as components:    RDW-SD 53.5 (*)     Lymphocyte Absolute 0.80 (*)     All other components within normal limits   CBC WITH DIFFERENTIAL WITH PLATELET    Narrative: The following orders were created for panel order CBC WITH DIFFERENTIAL WITH PLATELET.   Pr blood pressure.       Medications Prescribed:  Current Discharge Medication List                           Disposition and Plan     Clinical Impression:  Closed fracture of multiple ribs of right side, initial encounter  (primary encounter diagnosis)    Dis

## 2021-01-02 NOTE — ED INITIAL ASSESSMENT (HPI)
Pt with increased falling, last was last night. +tinnus. C/o generalized body pain, but denies head pain specifically. Pt recently hospitalized in October with a brain bleed which has healed since.

## 2021-01-02 NOTE — ED NOTES
Patient presents with:  Fall    /72   Pulse 88   Temp 99.7 °F (37.6 °C) (Oral)   Resp 16   Ht 175.3 cm (5' 9\")   Wt 67.6 kg   SpO2 92%   BMI 22.00 kg/m²   Patient aox3 to ed via private vehicle patient co of fall due to generalized weakness and tinn

## 2021-01-02 NOTE — ED NOTES
PATIENT APPROVED FOR DISCHARGE PER ER PROVIDER. Patient and wife GIVEN VERBAL AND WRITTEN DISCHARGE INSTRUCTIONS. GIVEN INSTRUCTIONS ON RX X 1, FOLLOW UP WITH pcp. VERBALIZES UNDERSTANDING AND SIGNED DISCHARGE INSTRUCTIONS.      PATIENT AOX3 OUT OF ED via w

## 2021-01-05 ENCOUNTER — TELEPHONE (OUTPATIENT)
Dept: INTERNAL MEDICINE CLINIC | Facility: CLINIC | Age: 71
End: 2021-01-05

## 2021-01-05 NOTE — TELEPHONE ENCOUNTER
Patient calling reports was seen at ER on 1/2/2021 for broken ribs   Was given Norco and tried one dose and \" it didn't work \"   Has taken [place] before without success       Rates pain currently at 8-9/10   Very difficult to move around , breathing is st

## 2021-01-05 NOTE — TELEPHONE ENCOUNTER
On further evaluation tramadol may interfere with his antidepressant and causes seizures so I will not be sending tramadol over. I am unaware of any other product stronger than Norco or tramadol to help with rib pain.   I am not sure why [place] does not wor

## 2021-01-05 NOTE — TELEPHONE ENCOUNTER
A prescription for tramadol will be sent over for the patient to see if this is more effective than the Norco he has tried.   Please call him and have him pick them up at the pharmacy but did not use the tramadol and the Norco together as this could be too

## 2021-01-05 NOTE — TELEPHONE ENCOUNTER
Was seen in the ED on 1/2/2020 not address by triage. ----- Message from Khushboo Duong sent at 1/5/2021  3:28 PM CST -----  Regarding: Prescription Question  Contact: [phone]  I am in a lot of pain because of my broken ribs.   Is there somethin

## 2021-01-06 ENCOUNTER — VIRTUAL PHONE E/M (OUTPATIENT)
Dept: INTERNAL MEDICINE CLINIC | Facility: CLINIC | Age: 71
End: 2021-01-06
Payer: MEDICARE

## 2021-01-06 ENCOUNTER — MED REC SCAN ONLY (OUTPATIENT)
Dept: INTERNAL MEDICINE CLINIC | Facility: CLINIC | Age: 71
End: 2021-01-06

## 2021-01-06 DIAGNOSIS — S22.49XA MULTIPLE RIB FRACTURES INVOLVING FOUR OR MORE RIBS: Primary | ICD-10-CM

## 2021-01-06 PROCEDURE — 99442 PHONE E/M BY PHYS 11-20 MIN: CPT | Performed by: INTERNAL MEDICINE

## 2021-01-06 RX ORDER — HYDROCODONE BITARTRATE AND ACETAMINOPHEN 5; 325 MG/1; MG/1
1 TABLET ORAL EVERY 6 HOURS PRN
Qty: 40 TABLET | Refills: 0 | Status: SHIPPED | OUTPATIENT
Start: 2021-01-06 | End: 2021-06-16

## 2021-01-06 NOTE — PROGRESS NOTES
Patient ID: Christen Rogel is a 72year old male. No chief complaint on file. Virtual/Telephone Check-In    Christen Rogel verbally consents to a Virtual/Telephone Check-In service on 01/06/21.  Patient understands and accepts financial respons file    Social Needs      Financial resource strain: Not on file      Food insecurity        Worry: Not on file        Inability: Not on file      Transportation needs        Medical: Not on file        Non-medical: Not on file    Tobacco Use      Smoking stairs. PHYSICAL EXAM:   Unable to perform vitals or do physical exam as this is a telephone visit.     ASSESSMENT/PLAN:   1. Multiple rib fractures involving four or more ribs    Ice ribs intermittently as tolerated for short periods of time to decrea

## 2021-01-21 ENCOUNTER — TELEPHONE (OUTPATIENT)
Dept: INTERNAL MEDICINE CLINIC | Facility: CLINIC | Age: 71
End: 2021-01-21

## 2021-01-21 NOTE — TELEPHONE ENCOUNTER
Lynnette Sanford states she have a outstanding order she need sign it is for to change a visit that the patient was going to been seen the week of the 20th December.       She states it was sent via fax and she will fax again

## 2021-01-25 NOTE — TELEPHONE ENCOUNTER
Wei/  of [address] [place] [address] is calling to follow up on status of order. Tae Key states she has not received signed order.     [facility]#[phone]

## 2021-01-28 ENCOUNTER — TELEPHONE (OUTPATIENT)
Dept: INTERNAL MEDICINE CLINIC | Facility: CLINIC | Age: 71
End: 2021-01-28

## 2021-01-28 NOTE — TELEPHONE ENCOUNTER
Pt's wife very tearful, stated pt is not better, drinking a lot of alcohol due to severe tinnitus , he sleeps in the Den in the recliner, she has spoken to the [employer], for Audiology regarding his Dx and was advised Medical Marijuana    Stated that wo

## 2021-01-29 NOTE — TELEPHONE ENCOUNTER
Followed up with Bryan Fairchild, advised of recommendations, Bryan Fairchild confirms she started the application process for the medical marijuana but patient will need a provider that works with medical marijuana treatment.  Advised if I can confirm another provider we will pilar

## 2021-01-29 NOTE — TELEPHONE ENCOUNTER
Followed up with supervisor, confirmed that FM providers in the [facility] office are certified for medical marijuana, please advise how we should proceed.

## 2021-01-29 NOTE — TELEPHONE ENCOUNTER
I am not involved with prescribing marijuana. I believe there may be someone in our family practice department to does prescribe marijuana for various conditions.   Please check with family practice to see who might be able to help the patient in this rega

## 2021-01-29 NOTE — TELEPHONE ENCOUNTER
If patient would like to be evaluated by the physicians in [facility] they prescribe medical marijuana I have no problem with it. That might expedite it’s getting in and getting treatment for his condition.  Please notify him

## 2021-02-01 NOTE — TELEPHONE ENCOUNTER
Dr Cherelle Bello, are you able to see this patient for evaluation and treatment of his condition with Medical Marijuana?

## 2021-02-02 NOTE — TELEPHONE ENCOUNTER
Spoke with patient. Verified name and . Patient verbalizes understanding of Dr. Nida Ryan of note below.

## 2021-02-02 NOTE — TELEPHONE ENCOUNTER
I would suggest they check the Internet for local doctors that do prescribe marijuana and establish care with them so he can get it through their services.  I am unfamiliar with any doctors at [facility] other than the [facility] office that do use marijuana so

## 2021-02-05 ENCOUNTER — NURSE TRIAGE (OUTPATIENT)
Dept: INTERNAL MEDICINE CLINIC | Facility: CLINIC | Age: 71
End: 2021-02-05

## 2021-02-05 NOTE — TELEPHONE ENCOUNTER
Spouse Pat(on hipaa) indicated that patient went to [address] [place] [address] on 1/2/2021 and was found to have 5 broken ribs. Has been doing the incentive spirometry. For the last 5 days has had a dry cough. Yesterday patient was reaching and fell out of bed.  Today patient

## 2021-02-06 ENCOUNTER — APPOINTMENT (OUTPATIENT)
Dept: CT IMAGING | Facility: HOSPITAL | Age: 71
End: 2021-02-06
Attending: EMERGENCY MEDICINE
Payer: MEDICARE

## 2021-02-06 ENCOUNTER — HOSPITAL ENCOUNTER (OUTPATIENT)
Age: 71
Discharge: ANOTHER HOSPITAL | End: 2021-02-06
Payer: MEDICARE

## 2021-02-06 ENCOUNTER — HOSPITAL ENCOUNTER (EMERGENCY)
Facility: HOSPITAL | Age: 71
Discharge: HOME | End: 2021-02-06
Attending: EMERGENCY MEDICINE
Payer: MEDICARE

## 2021-02-06 VITALS
WEIGHT: 155 LBS | BODY MASS INDEX: 23.49 KG/M2 | HEIGHT: 68 IN | OXYGEN SATURATION: 99 % | HEART RATE: 81 BPM | SYSTOLIC BLOOD PRESSURE: 156 MMHG | RESPIRATION RATE: 14 BRPM | DIASTOLIC BLOOD PRESSURE: 87 MMHG | TEMPERATURE: 99 F

## 2021-02-06 VITALS
SYSTOLIC BLOOD PRESSURE: 128 MMHG | RESPIRATION RATE: 18 BRPM | TEMPERATURE: 100 F | HEART RATE: 86 BPM | DIASTOLIC BLOOD PRESSURE: 70 MMHG | OXYGEN SATURATION: 93 %

## 2021-02-06 DIAGNOSIS — R07.81 RIB PAIN: ICD-10-CM

## 2021-02-06 DIAGNOSIS — R29.6 MULTIPLE FALLS: ICD-10-CM

## 2021-02-06 DIAGNOSIS — R07.89 RIGHT-SIDED CHEST WALL PAIN: Primary | ICD-10-CM

## 2021-02-06 DIAGNOSIS — R53.83 FATIGUE, UNSPECIFIED TYPE: Primary | ICD-10-CM

## 2021-02-06 DIAGNOSIS — R41.0 DISORIENTATION: ICD-10-CM

## 2021-02-06 LAB
ALBUMIN SERPL-MCNC: 3.9 G/DL (ref 3.4–5)
ALP LIVER SERPL-CCNC: 138 U/L
ALT SERPL-CCNC: 53 U/L
ANION GAP SERPL CALC-SCNC: 7 MMOL/L (ref 0–18)
AST SERPL-CCNC: 57 U/L (ref 15–37)
BASOPHILS # BLD AUTO: 0.04 X10(3) UL (ref 0–0.2)
BASOPHILS NFR BLD AUTO: 0.7 %
BILIRUB DIRECT SERPL-MCNC: 0.1 MG/DL (ref 0–0.2)
BILIRUB SERPL-MCNC: 0.6 MG/DL (ref 0.1–2)
BUN BLD-MCNC: 13 MG/DL (ref 7–18)
BUN/CREAT SERPL: 16.3 (ref 10–20)
CALCIUM BLD-MCNC: 9.7 MG/DL (ref 8.5–10.1)
CHLORIDE SERPL-SCNC: 111 MMOL/L (ref 98–112)
CO2 SERPL-SCNC: 26 MMOL/L (ref 21–32)
CREAT BLD-MCNC: 0.8 MG/DL
DEPRECATED RDW RBC AUTO: 51.7 FL (ref 35.1–46.3)
EOSINOPHIL # BLD AUTO: 0.15 X10(3) UL (ref 0–0.7)
EOSINOPHIL NFR BLD AUTO: 2.5 %
ERYTHROCYTE [DISTWIDTH] IN BLOOD BY AUTOMATED COUNT: 14.1 % (ref 11–15)
GLUCOSE BLD-MCNC: 84 MG/DL (ref 70–99)
HCT VFR BLD AUTO: 47.7 %
HGB BLD-MCNC: 15 G/DL
IMM GRANULOCYTES # BLD AUTO: 0.02 X10(3) UL (ref 0–1)
IMM GRANULOCYTES NFR BLD: 0.3 %
LYMPHOCYTES # BLD AUTO: 1.17 X10(3) UL (ref 1–4)
LYMPHOCYTES NFR BLD AUTO: 19.1 %
M PROTEIN MFR SERPL ELPH: 7.2 G/DL (ref 6.4–8.2)
MCH RBC QN AUTO: 31.1 PG (ref 26–34)
MCHC RBC AUTO-ENTMCNC: 31.4 G/DL (ref 31–37)
MCV RBC AUTO: 98.8 FL
MONOCYTES # BLD AUTO: 0.56 X10(3) UL (ref 0.1–1)
MONOCYTES NFR BLD AUTO: 9.2 %
NEUTROPHILS # BLD AUTO: 4.17 X10 (3) UL (ref 1.5–7.7)
NEUTROPHILS # BLD AUTO: 4.17 X10(3) UL (ref 1.5–7.7)
NEUTROPHILS NFR BLD AUTO: 68.2 %
OSMOLALITY SERPL CALC.SUM OF ELEC: 297 MOSM/KG (ref 275–295)
PLATELET # BLD AUTO: 167 10(3)UL (ref 150–450)
POTASSIUM SERPL-SCNC: 4.3 MMOL/L (ref 3.5–5.1)
RBC # BLD AUTO: 4.83 X10(6)UL
SODIUM SERPL-SCNC: 144 MMOL/L (ref 136–145)
WBC # BLD AUTO: 6.1 X10(3) UL (ref 4–11)

## 2021-02-06 PROCEDURE — 80076 HEPATIC FUNCTION PANEL: CPT | Performed by: EMERGENCY MEDICINE

## 2021-02-06 PROCEDURE — 80048 BASIC METABOLIC PNL TOTAL CA: CPT | Performed by: EMERGENCY MEDICINE

## 2021-02-06 PROCEDURE — 99213 OFFICE O/P EST LOW 20 MIN: CPT | Performed by: NURSE PRACTITIONER

## 2021-02-06 PROCEDURE — 36415 COLL VENOUS BLD VENIPUNCTURE: CPT

## 2021-02-06 PROCEDURE — 99284 EMERGENCY DEPT VISIT MOD MDM: CPT

## 2021-02-06 PROCEDURE — 70450 CT HEAD/BRAIN W/O DYE: CPT | Performed by: EMERGENCY MEDICINE

## 2021-02-06 PROCEDURE — 71260 CT THORAX DX C+: CPT | Performed by: EMERGENCY MEDICINE

## 2021-02-06 PROCEDURE — 85025 COMPLETE CBC W/AUTO DIFF WBC: CPT | Performed by: EMERGENCY MEDICINE

## 2021-02-06 PROCEDURE — 93000 ELECTROCARDIOGRAM COMPLETE: CPT | Performed by: NURSE PRACTITIONER

## 2021-02-06 NOTE — ED INITIAL ASSESSMENT (HPI)
Patient presents to ER with c/o worsening cough. Patient currently has right sided rib fractures from a fall. HX of multiple falls recently that patient states is due to his drinking excessively. Last had 1.5 shots of whiskey this morning.  Can drink 5-6 sh

## 2021-02-06 NOTE — ED PROVIDER NOTES
Patient Seen in: [facility] AND [facility] Emergency Department      History   Patient presents with:  Cough/URI    Stated Complaint: pneumonia? blood clot?  sent by IC     HPI/Subjective:   HPI    41-year-old male with past medical history significant for intrac systems are as noted in HPI. Constitutional and vital signs reviewed. All other systems reviewed and negative except as noted above.     Physical Exam     ED Triage Vitals [02/06/21 1239]   /89   Pulse 80   Resp 20   Temp 98.7 °F (37.1 °C)   Tem DIFFERENTIAL WITH PLATELET.   Procedure                               Abnormality         Status                     ---------                               -----------         ------                     CBC W/ DIFFERENTIAL[[id number]]          Abnormal

## 2021-02-06 NOTE — ED PROVIDER NOTES
Patient Seen in: [facility]    History   CC: extreme fatigue  HPI: Bryn Mac 72year old male  who presents w/ wife for multiple complaints.  They advise he has been on a decline for the past 6mo in which pt began drinking increasingly Alcohol/week: 0.0 standard drinks      Frequency: 4 or more times a week      Drinks per session: 5 or 6      Binge frequency: Weekly      Comment: 4-5 shots/day    Drug use: No      ROS:  Review of Systems    Positive for stated complaint: fell 1/2 broken posterior pharynx is without erythema and without tonsilar enlargement or exudate, uvula midline, +gag, voice is clear  Neck - supple with trachea midline, no tenderness upon palpation to posterior c-spine, no obvious sign of trauma/swelling/step-off, full

## 2021-02-06 NOTE — TELEPHONE ENCOUNTER
Wife called and she stated that she is calling us to let us know she is going to take pt to [facility] and that she needed a wheelchair once she got there. Wife was informed that they do have wheelchairs if needed by the entrance. She verbalized understanding.

## 2021-02-06 NOTE — TELEPHONE ENCOUNTER
Wife states they attempted to go to  and started driving and patient was falling asleep in the car and she turned around and went home. Today he is worse. Very tired and confused today, especially with time of day.   Wife denies fever but mentions giana

## 2021-02-06 NOTE — ED INITIAL ASSESSMENT (HPI)
Pt states he broke 5 ribs on the right side x 1 month ago. Pt has dry cough, fatigue and has worsening pain. Pain rating 10/10, throbbing pain.

## 2021-02-20 NOTE — TELEPHONE ENCOUNTER
Patient seen at [address] [place] [address] ER on 2/6/2021. Disposition and Plan    Clinical Impression:  Right-sided chest wall pain  (primary encounter diagnosis)     Disposition:  There is no disposition on file for this visit.   There is no disposition time on file for this

## 2021-02-22 ENCOUNTER — TELEPHONE (OUTPATIENT)
Dept: INTERNAL MEDICINE CLINIC | Facility: CLINIC | Age: 71
End: 2021-02-22

## 2021-02-22 RX ORDER — ATORVASTATIN CALCIUM 20 MG/1
TABLET, FILM COATED ORAL
Qty: 30 TABLET | Refills: 0 | Status: SHIPPED | OUTPATIENT
Start: 2021-02-22 | End: 2021-03-23

## 2021-02-22 NOTE — TELEPHONE ENCOUNTER
Informed wife of Dr. Dona Siemens Reken's message as per below. Wife verbalizes understanding and agrees.

## 2021-02-22 NOTE — TELEPHONE ENCOUNTER
Patient's spouse (on HALEY) states that patient was seen in the ER 2/6/21 for multiple falls in past several months. Spouse states patient is still having pain in his right side, he has \"cracked ribs\".  Patient's spouse is asking if Renu Barksdale can order

## 2021-02-22 NOTE — TELEPHONE ENCOUNTER
Call and tell the wife the patient did have liver function studies done 2 weeks ago that were only mildly abnormal.

## 2021-02-23 ENCOUNTER — TELEMEDICINE (OUTPATIENT)
Dept: INTERNAL MEDICINE CLINIC | Facility: CLINIC | Age: 71
End: 2021-02-23
Payer: MEDICARE

## 2021-02-23 DIAGNOSIS — R51.9 HEADACHE DISORDER: ICD-10-CM

## 2021-02-23 DIAGNOSIS — E78.2 MIXED HYPERLIPIDEMIA: Primary | ICD-10-CM

## 2021-02-23 DIAGNOSIS — H93.13 TINNITUS OF BOTH EARS: ICD-10-CM

## 2021-02-23 PROCEDURE — 99442 PHONE E/M BY PHYS 11-20 MIN: CPT | Performed by: INTERNAL MEDICINE

## 2021-02-23 NOTE — PROGRESS NOTES
Patient ID: David Perdomo is a 72year old male. No chief complaint on file. Virtual/Telephone Check-In    David Perdomo verbally consents to a Virtual/Telephone Check-In service on 01/06/21.  Patient understands and accepts financial respons on file    Occupational History      Not on file    Social Needs      Financial resource strain: Not on file      Food insecurity        Worry: Not on file        Inability: Not on file      Transportation needs        Medical: Not on file        Non-medic The patient does live in a home with stairs. PHYSICAL EXAM:   Unable to perform vitals or do physical exam as this is a telephone visit. ASSESSMENT/PLAN:   1.  Mixed hyperlipidemia    Patient instructed to take cholesterol lowering medications as two-way, real-time interactive audio and/or video communication.   This has been done in good ameya to provide continuity of care in the best interest of the provider-patient relationship, due to the ongoing public health crisis/national emergency and becau

## 2021-03-04 ENCOUNTER — TELEPHONE (OUTPATIENT)
Dept: INTERNAL MEDICINE CLINIC | Facility: CLINIC | Age: 71
End: 2021-03-04

## 2021-03-04 NOTE — TELEPHONE ENCOUNTER
Message # [phone] [id number]         2021 03:10a   [MEGANT]  To:  Omar Castellon  From:  Primary MD:  Phone#:  ----------------------------------------------------------------------  POOL Frazier [phone];  [birth year]; RE PT Carissa Martínez; RE PAIN          (Mes

## 2021-03-04 NOTE — TELEPHONE ENCOUNTER
Wife called at 3 AM stating that patient had a concussion in October and he is requesting to be taken to the ER  She is not sure what to do--asked patient if he has any complaints and she said no but he just wants to go to the ER and she does not think giana

## 2021-03-23 RX ORDER — ATORVASTATIN CALCIUM 20 MG/1
TABLET, FILM COATED ORAL
Qty: 30 TABLET | Refills: 0 | Status: SHIPPED | OUTPATIENT
Start: 2021-03-23 | End: 2021-04-08

## 2021-04-04 ENCOUNTER — APPOINTMENT (OUTPATIENT)
Dept: CT IMAGING | Facility: HOSPITAL | Age: 71
End: 2021-04-04
Attending: EMERGENCY MEDICINE
Payer: MEDICARE

## 2021-04-04 ENCOUNTER — HOSPITAL ENCOUNTER (EMERGENCY)
Facility: HOSPITAL | Age: 71
Discharge: HOME | End: 2021-04-04
Attending: EMERGENCY MEDICINE
Payer: MEDICARE

## 2021-04-04 VITALS
TEMPERATURE: 98 F | SYSTOLIC BLOOD PRESSURE: 141 MMHG | HEIGHT: 69 IN | RESPIRATION RATE: 18 BRPM | BODY MASS INDEX: 23.7 KG/M2 | HEART RATE: 85 BPM | WEIGHT: 160 LBS | OXYGEN SATURATION: 93 % | DIASTOLIC BLOOD PRESSURE: 75 MMHG
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
## 2021-04-04 DIAGNOSIS — S01.01XA LACERATION OF SCALP WITHOUT FOREIGN BODY, INITIAL ENCOUNTER: ICD-10-CM

## 2021-04-04 PROCEDURE — 99284 EMERGENCY DEPT VISIT MOD MDM: CPT

## 2021-04-04 PROCEDURE — 72125 CT NECK SPINE W/O DYE: CPT | Performed by: EMERGENCY MEDICINE

## 2021-04-04 PROCEDURE — 70450 CT HEAD/BRAIN W/O DYE: CPT | Performed by: EMERGENCY MEDICINE

## 2021-04-04 PROCEDURE — 12001 RPR S/N/AX/GEN/TRNK 2.5CM/<: CPT

## 2021-04-04 NOTE — ED INITIAL ASSESSMENT (HPI)
EMS states that the pt's wife heard a thud in thee BR. Pt fell and hit his head. Has a lac to the back of the head and abrasions to forehead and nose.  No N/V.  BN other trauma noted

## 2021-04-04 NOTE — ED QUICK NOTES
Patient received discharge instructions with follow-up instructions and verbalized understanding. Patient discharged out of ER in stable condition in no apparent distress accompanied by wife.

## 2021-04-04 NOTE — ED PROVIDER NOTES
Patient Seen in: [facility] AND [facility] Emergency Department      History   Patient presents with:  Fall    Stated Complaint: fall    HPI/Subjective:   HPI    26-year-old male presents for evaluation after a fall.   Patient with the patient fell, got a month a blood to right nare, no septal hematoma bilaterally  Eyes:      Conjunctiva/sclera: Conjunctivae normal.   Cardiovascular:      Rate and Rhythm: Normal rate and regular rhythm. Heart sounds: Normal heart sounds.    Pulmonary:      Effort: Pulmonary eff performed by myself. No evidence of fracture, intracranial hemorrhage, dislocation. Patient at baseline according to wife. Laceration repaired as above. Discharged with outpatient follow-up and return precautions.                        Disposition an

## 2021-04-08 RX ORDER — ATORVASTATIN CALCIUM 20 MG/1
TABLET, FILM COATED ORAL
Qty: 30 TABLET | Refills: 0 | Status: SHIPPED | OUTPATIENT
Start: 2021-04-08 | End: 2021-05-10

## 2021-04-12 ENCOUNTER — OFFICE VISIT (OUTPATIENT)
Dept: INTERNAL MEDICINE CLINIC | Facility: CLINIC | Age: 71
End: 2021-04-12
Payer: MEDICARE

## 2021-04-12 VITALS — RESPIRATION RATE: 16 BRPM | SYSTOLIC BLOOD PRESSURE: 111 MMHG | HEART RATE: 93 BPM | DIASTOLIC BLOOD PRESSURE: 77 MMHG

## 2021-04-12 DIAGNOSIS — H93.13 TINNITUS OF BOTH EARS: ICD-10-CM

## 2021-04-12 DIAGNOSIS — E78.2 MIXED HYPERLIPIDEMIA: ICD-10-CM

## 2021-04-12 DIAGNOSIS — S09.90XD TRAUMATIC INJURY OF HEAD, SUBSEQUENT ENCOUNTER: Primary | ICD-10-CM

## 2021-04-12 PROBLEM — S09.90XA HEAD TRAUMA: Status: ACTIVE | Noted: 2021-04-12

## 2021-04-12 PROCEDURE — 99214 OFFICE O/P EST MOD 30 MIN: CPT | Performed by: INTERNAL MEDICINE

## 2021-04-12 NOTE — PROGRESS NOTES
Ashanti Rojas is a 80year old male. HPI:   1. Traumatic injury of head, subsequent encounter    Had a fall 8 days ago and fell over and hit head causing a cut that required stitches.   Currently is doing better from the cut he had in his head is not h denies any unusual skin lesions or rashes  RESPIRATORY: denies shortness of breath with exertion  CARDIOVASCULAR: denies chest pain on exertion  GI: denies abdominal pain and denies heartburn  NEURO: denies headaches    EXAM:   /77 (BP Location: Righ him over the last 10 years.     The patient has found a specialist who will help with the tinnitus and I recommend he see this doctor since current treatments the patient has been offered have been unsuccessful at relieving his chronic problem he has suffer

## 2021-04-21 ENCOUNTER — TELEPHONE (OUTPATIENT)
Dept: OTOLARYNGOLOGY | Age: 71
End: 2021-04-21

## 2021-04-21 ENCOUNTER — TELEPHONE (OUTPATIENT)
Dept: INTERNAL MEDICINE CLINIC | Facility: CLINIC | Age: 71
End: 2021-04-21

## 2021-04-21 DIAGNOSIS — H91.90 HEARING DISORDER, UNSPECIFIED LATERALITY: Primary | ICD-10-CM

## 2021-04-21 NOTE — TELEPHONE ENCOUNTER
Per Morgan Wilson, they received a referral for Neuro-otology but need to fax any clinical record, hearing test or any other records pertaining to why patient is been referring to the doctor. Please fax it to [phone].

## 2021-05-10 RX ORDER — ATORVASTATIN CALCIUM 20 MG/1
TABLET, FILM COATED ORAL
Qty: 30 TABLET | Refills: 0 | Status: SHIPPED | OUTPATIENT
Start: 2021-05-10 | End: 2021-06-16

## 2021-05-12 ENCOUNTER — HOSPITAL ENCOUNTER (OUTPATIENT)
Age: 71
Discharge: HOME | End: 2021-05-12
Attending: EMERGENCY MEDICINE
Payer: MEDICARE

## 2021-05-12 ENCOUNTER — APPOINTMENT (OUTPATIENT)
Dept: GENERAL RADIOLOGY | Age: 71
End: 2021-05-12
Attending: EMERGENCY MEDICINE
Payer: MEDICARE

## 2021-05-12 VITALS
HEART RATE: 92 BPM | DIASTOLIC BLOOD PRESSURE: 86 MMHG | SYSTOLIC BLOOD PRESSURE: 130 MMHG | OXYGEN SATURATION: 97 % | RESPIRATION RATE: 20 BRPM | TEMPERATURE: 98 F

## 2021-05-12 DIAGNOSIS — S66.911A WRIST STRAIN, RIGHT, INITIAL ENCOUNTER: Primary | ICD-10-CM

## 2021-05-12 PROCEDURE — 99213 OFFICE O/P EST LOW 20 MIN: CPT

## 2021-05-12 PROCEDURE — 73110 X-RAY EXAM OF WRIST: CPT | Performed by: EMERGENCY MEDICINE

## 2021-05-12 NOTE — ED INITIAL ASSESSMENT (HPI)
Mg [place] 3 weeks ago ambulating with a walker. C/o pain to right wrist and forearm. Pain not improving. No deformity. + distal CMS.

## 2021-05-18 ENCOUNTER — OFFICE VISIT (OUTPATIENT)
Dept: OTOLARYNGOLOGY | Facility: CLINIC | Age: 71
End: 2021-05-18
Payer: MEDICARE

## 2021-05-18 VITALS
HEIGHT: 69 IN | DIASTOLIC BLOOD PRESSURE: 77 MMHG | BODY MASS INDEX: 23.7 KG/M2 | SYSTOLIC BLOOD PRESSURE: 132 MMHG | TEMPERATURE: 97 F | HEART RATE: 100 BPM | WEIGHT: 160 LBS

## 2021-05-18 DIAGNOSIS — H61.23 BILATERAL IMPACTED CERUMEN: Primary | ICD-10-CM

## 2021-05-18 PROCEDURE — 69210 REMOVE IMPACTED EAR WAX UNI: CPT | Performed by: OTOLARYNGOLOGY

## 2021-05-18 NOTE — PROGRESS NOTES
Jackson Favorite is a 80year old male.   Patient presents with:  Ear Wax      HISTORY OF PRESENT ILLNESS    Patient presents for cerumen removal. No other complaints or concerns at this time    Social History    Socioeconomic History      Marital status: PHYSICAL EXAM    /77 (BP Location: Right arm, Patient Position: Sitting, Cuff Size: adult)   Pulse 100   Temp 97 °F (36.1 °C) (Tympanic)   Ht 5' 9\" (1.753 m)   Wt 160 lb (72.6 kg)   BMI 23.63 kg/m²        Constitutional Normal Overall appe to return to see me as needed for repeat cerumen removal in the future. Aly Thorpe.  Julito Sims MD    5/18/2021    12:05 PM

## 2021-05-19 NOTE — ED PROVIDER NOTES
Patient Seen in: [facility]      History   Patient presents with:  Fall  Arm Pain    Stated Complaint: trouble hearing    HPI/Subjective:   HPI    Fell onto outstretched right hand three weeks ago.  C/o persistent pain in the right wrist that Musculoskeletal:      Cervical back: Normal range of motion and neck supple. Comments: Right wrist tenderness. Pain with range of motion. Distal neurovascular intact. Skin:     General: Skin is warm and dry.       Capillary Refill: Capillary refil

## 2021-05-24 ENCOUNTER — APPOINTMENT (OUTPATIENT)
Dept: CT IMAGING | Facility: HOSPITAL | Age: 71
End: 2021-05-24
Attending: EMERGENCY MEDICINE
Payer: MEDICARE

## 2021-05-24 ENCOUNTER — HOSPITAL ENCOUNTER (EMERGENCY)
Facility: HOSPITAL | Age: 71
Discharge: HOME | End: 2021-05-24
Attending: EMERGENCY MEDICINE
Payer: MEDICARE

## 2021-05-24 VITALS
WEIGHT: 155 LBS | RESPIRATION RATE: 16 BRPM | HEART RATE: 82 BPM | OXYGEN SATURATION: 93 % | SYSTOLIC BLOOD PRESSURE: 126 MMHG | BODY MASS INDEX: 23.49 KG/M2 | HEIGHT: 68 IN | DIASTOLIC BLOOD PRESSURE: 83 MMHG | TEMPERATURE: 98 F

## 2021-05-24 DIAGNOSIS — S09.90XA INJURY OF HEAD, INITIAL ENCOUNTER: Primary | ICD-10-CM

## 2021-05-24 DIAGNOSIS — S01.81XA LACERATION OF FOREHEAD, INITIAL ENCOUNTER: ICD-10-CM

## 2021-05-24 PROCEDURE — 99284 EMERGENCY DEPT VISIT MOD MDM: CPT

## 2021-05-24 PROCEDURE — 70450 CT HEAD/BRAIN W/O DYE: CPT | Performed by: EMERGENCY MEDICINE

## 2021-05-24 PROCEDURE — 12013 RPR F/E/E/N/L/M 2.6-5.0 CM: CPT

## 2021-05-24 NOTE — ED INITIAL ASSESSMENT (HPI)
Patient presents to ER with c/o fall and laceration to forehead while at home this morning. States he tripped over his walker and landed on a glass table. Laceration noted to forehead. Bleeding controlled at this time. Denies LOC or use of blood thinners.

## 2021-05-24 NOTE — ED PROVIDER NOTES
Patient Seen in: [facility] AND [facility] Emergency Department      History   Patient presents with:  Fall    Stated Complaint: Fall, head laceration     HPI/Subjective:   HPI    81-year-old male presents for evaluation for a fall.   Patient uses a walker to get 90   Resp 20   Temp 98 °F (36.7 °C)   Temp src Oral   SpO2 93 %   O2 Device None (Room air)       Current:/83   Pulse 82   Temp 98 °F (36.7 °C) (Oral)   Resp 16   Ht 172.7 cm (5' 8\")   Wt 70.3 kg   SpO2 93%   BMI 23.57 kg/m²         Physical Exam  V Musculoskeletal:      Right shoulder: Normal.      Left shoulder: Normal.      Right elbow: Normal.      Left elbow: Normal.      Right wrist: Normal.      Left wrist: Normal.      Right hand: Normal.      Left hand: Normal.      Cervical back: Full passiv MDM    Patient's tetanus is up-to-date. Wound was irrigated and repaired. Cervical spine cleared using Nexus criteria. Remainder of exam is unremarkable. Fall was nonsyncopal. Head CT neg.       Imaging:   CT BRAIN OR HEAD ([id number])    Result Date: 5/2 abnormality. Dictated by (CST): Nick Alan MD on 5/24/2021 at 8:06 AM     Finalized by (CST): Nick Alan MD on 5/24/2021 at 8:12 AM            I personally reviewed all radiology images.     Medical Record Review: I personally reviewed available prior

## 2021-05-26 ASSESSMENT — ENCOUNTER SYMPTOMS: DRY SKIN: 1

## 2021-06-02 ENCOUNTER — OFFICE VISIT (OUTPATIENT)
Dept: AUDIOLOGY | Age: 71
End: 2021-06-02

## 2021-06-02 ENCOUNTER — OFFICE VISIT (OUTPATIENT)
Dept: OTOLARYNGOLOGY | Age: 71
End: 2021-06-02

## 2021-06-02 VITALS
WEIGHT: 160 LBS | OXYGEN SATURATION: 96 % | BODY MASS INDEX: 24.33 KG/M2 | HEART RATE: 104 BPM | DIASTOLIC BLOOD PRESSURE: 60 MMHG | SYSTOLIC BLOOD PRESSURE: 124 MMHG

## 2021-06-02 DIAGNOSIS — Z87.828 HISTORY OF TRAUMATIC HEAD INJURY: ICD-10-CM

## 2021-06-02 DIAGNOSIS — H93.13 TINNITUS OF BOTH EARS: Primary | ICD-10-CM

## 2021-06-02 DIAGNOSIS — H90.3 SENSORINEURAL HEARING LOSS (SNHL) OF BOTH EARS: ICD-10-CM

## 2021-06-02 PROCEDURE — 99204 OFFICE O/P NEW MOD 45 MIN: CPT | Performed by: PHYSICIAN ASSISTANT

## 2021-06-02 PROCEDURE — 92504 EAR MICROSCOPY EXAMINATION: CPT | Performed by: PHYSICIAN ASSISTANT

## 2021-06-02 PROCEDURE — 92567 TYMPANOMETRY: CPT | Performed by: AUDIOLOGIST

## 2021-06-02 PROCEDURE — 92557 COMPREHENSIVE HEARING TEST: CPT | Performed by: AUDIOLOGIST

## 2021-06-02 RX ORDER — ALPRAZOLAM 0.5 MG/1
0.5 TABLET ORAL SEE ADMIN INSTRUCTIONS
Status: ON HOLD | COMMUNITY
End: 2021-07-30 | Stop reason: HOSPADM

## 2021-06-02 RX ORDER — ALPRAZOLAM 0.25 MG/1
0.25 TABLET, ORALLY DISINTEGRATING ORAL 4 TIMES DAILY
Status: ON HOLD | COMMUNITY
Start: 2016-07-25 | End: 2021-07-30 | Stop reason: HOSPADM

## 2021-06-02 RX ORDER — ATORVASTATIN CALCIUM 20 MG/1
20 TABLET, FILM COATED ORAL DAILY
COMMUNITY
Start: 2016-07-07

## 2021-06-02 RX ORDER — PAROXETINE HYDROCHLORIDE 40 MG/1
40 TABLET, FILM COATED ORAL DAILY
Status: ON HOLD | COMMUNITY
Start: 2016-08-04 | End: 2021-07-30 | Stop reason: HOSPADM

## 2021-06-03 ENCOUNTER — TELEPHONE (OUTPATIENT)
Dept: OTOLARYNGOLOGY | Age: 71
End: 2021-06-03

## 2021-06-10 ENCOUNTER — MED REC SCAN ONLY (OUTPATIENT)
Dept: INTERNAL MEDICINE CLINIC | Facility: CLINIC | Age: 71
End: 2021-06-10

## 2021-06-11 ENCOUNTER — TELEPHONE (OUTPATIENT)
Dept: INTERNAL MEDICINE CLINIC | Facility: CLINIC | Age: 71
End: 2021-06-11

## 2021-06-11 NOTE — TELEPHONE ENCOUNTER
Appointment confirmed.          Harper Bravo  Patient Customer Service Request Pool 5 hours ago (10:09 AM)     Topic: Selection     I was not allowed the ability to reply to the tentative appointment thru [address] set up with Dr. Juliocesar Cano on 6/

## 2021-06-16 PROBLEM — Z12.11 SCREENING FOR COLORECTAL CANCER: Status: RESOLVED | Noted: 2019-03-28 | Resolved: 2021-06-16

## 2021-06-16 PROBLEM — Z23 NEED FOR VACCINATION: Status: RESOLVED | Noted: 2019-11-18 | Resolved: 2021-06-16

## 2021-06-16 PROBLEM — R41.82 ALTERED MENTAL STATUS: Status: RESOLVED | Noted: 2020-12-30 | Resolved: 2021-06-16

## 2021-06-16 PROBLEM — Z00.00 PHYSICAL EXAM, ANNUAL: Status: RESOLVED | Noted: 2017-11-06 | Resolved: 2021-06-16

## 2021-06-16 PROBLEM — S09.90XA HEAD TRAUMA: Status: RESOLVED | Noted: 2021-04-12 | Resolved: 2021-06-16

## 2021-06-16 PROBLEM — Z12.12 SCREENING FOR COLORECTAL CANCER: Status: RESOLVED | Noted: 2019-03-28 | Resolved: 2021-06-16

## 2021-06-16 NOTE — PROGRESS NOTES
Gabriela Crawford is a 80year old male. Patient presents with:  Cough: Dry cough and wheezing per wife   Fall: Multiple Falls at home   Hair/Scalp Problem  Musculoskeletal Problem: Right Knee Pain     HPI:   80-year-old gentleman with past medical history for vaccination 11/18/2019   • Physical exam, annual 11/6/2017   • Right upper quadrant abdominal pain 12/8/2016   • Screening for colorectal cancer 3/28/2019   • Tinnitus    • Tinnitus 11/25/2016      Past Surgical History:   Procedure Laterality Date   • Appearance: Normal appearance. HENT:      Head: Normocephalic. Eyes:      Conjunctiva/sclera: Conjunctivae normal.   Cardiovascular:      Rate and Rhythm: Normal rate and regular rhythm. Heart sounds: Normal heart sounds. No murmur heard.      Pul longstanding catastrophic problem for him. He reports that he was seen by 14 ENT specialist and he is still suffering from this. He is going for biofeedback. The only thing that helps him is risky.   If you drink whiskey every 4 hours, he will be able to

## 2021-06-29 ENCOUNTER — TELEPHONE (OUTPATIENT)
Dept: INTERNAL MEDICINE CLINIC | Facility: CLINIC | Age: 71
End: 2021-06-29

## 2021-06-30 ENCOUNTER — TELEPHONE (OUTPATIENT)
Dept: INTERNAL MEDICINE CLINIC | Facility: CLINIC | Age: 71
End: 2021-06-30

## 2021-06-30 NOTE — TELEPHONE ENCOUNTER
Jeremy Terrell MD  You 2 minutes ago (11:09 AM)     Please reviewed the message again and stop sending to me I am not on-call if it is urgent the providers are looking at her own in basket.  Sent to the provider if it is urgent you paged the provider.

## 2021-06-30 NOTE — TELEPHONE ENCOUNTER
Left a message to call us back. We need to check on how the patient is doing. The patient called the on call on 21 Dr. Cristobal Patiño last night and was not addressed. Please see on call message.     PAT-WIFE [phone] RE [facility]  3-5

## 2021-06-30 NOTE — TELEPHONE ENCOUNTER
Message # [id number]         2021 08:09p   [IVONAR]  To:  From:  MARTHA Moralez MD:  Phone#:  ----------------------------------------------------------------------  [facility] [phone] RE PT MARCELINO BELLA [birth year] RE PT IS AT A POINT WHERE HE CAN

## 2021-06-30 NOTE — TELEPHONE ENCOUNTER
Patient called last night but then called back after couple minutes and said disregard the page or call in the morning and talk to his PCP.

## 2021-07-02 NOTE — TELEPHONE ENCOUNTER
The patient is not calling us back. Encounter closed.      6/30/21 11:10 AM  Note     Patient called last night but then called back after couple minutes and said disregard the page or call in the morning and talk to his PCP.

## 2021-07-23 ENCOUNTER — HOSPITAL ENCOUNTER (INPATIENT)
Age: 71
LOS: 11 days | Discharge: HOME | DRG: 897 | End: 2021-08-03
Attending: EMERGENCY MEDICINE | Admitting: INTERNAL MEDICINE

## 2021-07-23 DIAGNOSIS — F10.10 AA (ALCOHOL ABUSE): ICD-10-CM

## 2021-07-23 DIAGNOSIS — F41.1 GAD (GENERALIZED ANXIETY DISORDER): ICD-10-CM

## 2021-07-23 DIAGNOSIS — R29.6 FALLS FREQUENTLY: ICD-10-CM

## 2021-07-23 DIAGNOSIS — F10.29 ALCOHOL DEPENDENCE WITH UNSPECIFIED ALCOHOL-INDUCED DISORDER (CMD): Primary | ICD-10-CM

## 2021-07-23 DIAGNOSIS — R53.81 DEBILITY: ICD-10-CM

## 2021-07-23 DIAGNOSIS — H90.3 SENSORINEURAL HEARING LOSS (SNHL) OF BOTH EARS: ICD-10-CM

## 2021-07-23 DIAGNOSIS — H93.13 TINNITUS OF BOTH EARS: ICD-10-CM

## 2021-07-23 DIAGNOSIS — H93.19 TINNITUS, UNSPECIFIED LATERALITY: ICD-10-CM

## 2021-07-23 DIAGNOSIS — F13.10 BENZODIAZEPINE ABUSE (CMD): ICD-10-CM

## 2021-07-23 DIAGNOSIS — R26.9 GAIT DIFFICULTY: ICD-10-CM

## 2021-07-23 DIAGNOSIS — S06.9X9S TRAUMATIC BRAIN INJURY WITH LOSS OF CONSCIOUSNESS, SEQUELA (CMD): ICD-10-CM

## 2021-07-23 DIAGNOSIS — F10.220 ALCOHOL DEPENDENCE WITH UNCOMPLICATED INTOXICATION (CMD): ICD-10-CM

## 2021-07-23 PROBLEM — F10.20 ALCOHOL DEPENDENCE (CMD): Status: ACTIVE | Noted: 2021-07-23

## 2021-07-23 PROBLEM — M25.561 RECURRENT PAIN OF RIGHT KNEE: Status: ACTIVE | Noted: 2021-07-23

## 2021-07-23 LAB
ALBUMIN SERPL-MCNC: 3.5 G/DL (ref 3.6–5.1)
ALBUMIN/GLOB SERPL: 1.1 {RATIO} (ref 1–2.4)
ALP SERPL-CCNC: 116 UNITS/L (ref 45–117)
ALT SERPL-CCNC: 87 UNITS/L
AMPHETAMINES UR QL SCN>500 NG/ML: NEGATIVE
ANION GAP SERPL CALC-SCNC: 9 MMOL/L (ref 10–20)
AST SERPL-CCNC: 88 UNITS/L
BARBITURATES UR QL SCN>200 NG/ML: NEGATIVE
BASOPHILS # BLD: 0 K/MCL (ref 0–0.3)
BASOPHILS NFR BLD: 1 %
BENZODIAZ UR QL SCN>200 NG/ML: POSITIVE
BILIRUB SERPL-MCNC: 0.4 MG/DL (ref 0.2–1)
BUN SERPL-MCNC: 13 MG/DL (ref 6–20)
BUN/CREAT SERPL: 18 (ref 7–25)
BZE UR QL SCN>150 NG/ML: NEGATIVE
CALCIUM SERPL-MCNC: 9.3 MG/DL (ref 8.4–10.2)
CANNABINOIDS UR QL SCN>50 NG/ML: NEGATIVE
CHLORIDE SERPL-SCNC: 108 MMOL/L (ref 98–107)
CO2 SERPL-SCNC: 28 MMOL/L (ref 21–32)
CREAT SERPL-MCNC: 0.74 MG/DL (ref 0.67–1.17)
DEPRECATED RDW RBC: 54.7 FL (ref 39–50)
EOSINOPHIL # BLD: 0.1 K/MCL (ref 0–0.5)
EOSINOPHIL NFR BLD: 3 %
ERYTHROCYTE [DISTWIDTH] IN BLOOD: 14.1 % (ref 11–15)
ETHANOL SERPL-MCNC: 136 MG/DL
FASTING DURATION TIME PATIENT: ABNORMAL H
GFR SERPLBLD BASED ON 1.73 SQ M-ARVRAT: >90 ML/MIN/1.73M2
GLOBULIN SER-MCNC: 3.3 G/DL (ref 2–4)
GLUCOSE SERPL-MCNC: 101 MG/DL (ref 65–99)
HCT VFR BLD CALC: 47.8 % (ref 39–51)
HGB BLD-MCNC: 14.9 G/DL (ref 13–17)
IMM GRANULOCYTES # BLD AUTO: 0 K/MCL (ref 0–0.2)
IMM GRANULOCYTES # BLD: 0 %
LYMPHOCYTES # BLD: 1 K/MCL (ref 1–4)
LYMPHOCYTES NFR BLD: 20 %
MAGNESIUM SERPL-MCNC: 2.1 MG/DL (ref 1.7–2.4)
MCH RBC QN AUTO: 32.6 PG (ref 26–34)
MCHC RBC AUTO-ENTMCNC: 31.2 G/DL (ref 32–36.5)
MCV RBC AUTO: 104.6 FL (ref 78–100)
MONOCYTES # BLD: 0.6 K/MCL (ref 0.3–0.9)
MONOCYTES NFR BLD: 11 %
NEUTROPHILS # BLD: 3.4 K/MCL (ref 1.8–7.7)
NEUTROPHILS NFR BLD: 65 %
NRBC BLD MANUAL-RTO: 0 /100 WBC
OPIATES UR QL SCN>300 NG/ML: NEGATIVE
PCP UR QL SCN>25 NG/ML: NEGATIVE
PLATELET # BLD AUTO: 180 K/MCL (ref 140–450)
POTASSIUM SERPL-SCNC: 3.7 MMOL/L (ref 3.4–5.1)
PROT SERPL-MCNC: 6.8 G/DL (ref 6.4–8.2)
RAINBOW EXTRA TUBES HOLD SPECIMEN: NORMAL
RBC # BLD: 4.57 MIL/MCL (ref 4.5–5.9)
SARS-COV-2 RNA RESP QL NAA+PROBE: NOT DETECTED
SERVICE CMNT-IMP: NORMAL
SERVICE CMNT-IMP: NORMAL
SODIUM SERPL-SCNC: 141 MMOL/L (ref 135–145)
WBC # BLD: 5.2 K/MCL (ref 4.2–11)

## 2021-07-23 PROCEDURE — 82077 ASSAY SPEC XCP UR&BREATH IA: CPT | Performed by: EMERGENCY MEDICINE

## 2021-07-23 PROCEDURE — 80307 DRUG TEST PRSMV CHEM ANLYZR: CPT | Performed by: EMERGENCY MEDICINE

## 2021-07-23 PROCEDURE — 99285 EMERGENCY DEPT VISIT HI MDM: CPT | Performed by: EMERGENCY MEDICINE

## 2021-07-23 PROCEDURE — 10004651 HB RX, NO CHARGE ITEM: Performed by: INTERNAL MEDICINE

## 2021-07-23 PROCEDURE — 10002803 HB RX 637: Performed by: EMERGENCY MEDICINE

## 2021-07-23 PROCEDURE — 10002807 HB RX 258: Performed by: INTERNAL MEDICINE

## 2021-07-23 PROCEDURE — 80053 COMPREHEN METABOLIC PANEL: CPT | Performed by: EMERGENCY MEDICINE

## 2021-07-23 PROCEDURE — 10002803 HB RX 637: Performed by: INTERNAL MEDICINE

## 2021-07-23 PROCEDURE — 99223 1ST HOSP IP/OBS HIGH 75: CPT | Performed by: INTERNAL MEDICINE

## 2021-07-23 PROCEDURE — 87635 SARS-COV-2 COVID-19 AMP PRB: CPT | Performed by: EMERGENCY MEDICINE

## 2021-07-23 PROCEDURE — 10000002 HB ROOM CHARGE MED SURG

## 2021-07-23 PROCEDURE — 36415 COLL VENOUS BLD VENIPUNCTURE: CPT

## 2021-07-23 PROCEDURE — 85025 COMPLETE CBC W/AUTO DIFF WBC: CPT | Performed by: EMERGENCY MEDICINE

## 2021-07-23 PROCEDURE — 99284 EMERGENCY DEPT VISIT MOD MDM: CPT

## 2021-07-23 PROCEDURE — 83735 ASSAY OF MAGNESIUM: CPT | Performed by: EMERGENCY MEDICINE

## 2021-07-23 RX ORDER — PAROXETINE HYDROCHLORIDE 20 MG/1
40 TABLET, FILM COATED ORAL DAILY
Status: DISCONTINUED | OUTPATIENT
Start: 2021-07-24 | End: 2021-07-24

## 2021-07-23 RX ORDER — FOLIC ACID 1 MG/1
1 TABLET ORAL DAILY
Status: COMPLETED | OUTPATIENT
Start: 2021-07-23 | End: 2021-07-25

## 2021-07-23 RX ORDER — SODIUM CHLORIDE 9 MG/ML
INJECTION, SOLUTION INTRAVENOUS CONTINUOUS
Status: DISCONTINUED | OUTPATIENT
Start: 2021-07-23 | End: 2021-07-25

## 2021-07-23 RX ORDER — LORAZEPAM 2 MG/ML
2 INJECTION INTRAMUSCULAR
Status: DISCONTINUED | OUTPATIENT
Start: 2021-07-23 | End: 2021-07-30

## 2021-07-23 RX ORDER — GABAPENTIN 300 MG/1
300 CAPSULE ORAL EVERY 8 HOURS SCHEDULED
Status: DISCONTINUED | OUTPATIENT
Start: 2021-07-23 | End: 2021-07-26

## 2021-07-23 RX ORDER — LORAZEPAM 2 MG/ML
2 INJECTION INTRAMUSCULAR
Status: DISCONTINUED | OUTPATIENT
Start: 2021-07-23 | End: 2021-07-24

## 2021-07-23 RX ORDER — ENOXAPARIN SODIUM 100 MG/ML
40 INJECTION SUBCUTANEOUS DAILY
Status: DISCONTINUED | OUTPATIENT
Start: 2021-07-24 | End: 2021-08-03 | Stop reason: HOSPADM

## 2021-07-23 RX ORDER — ATORVASTATIN CALCIUM 20 MG/1
20 TABLET, FILM COATED ORAL DAILY
Status: DISCONTINUED | OUTPATIENT
Start: 2021-07-24 | End: 2021-08-03 | Stop reason: HOSPADM

## 2021-07-23 RX ORDER — FOLIC ACID 1 MG/1
1 TABLET ORAL DAILY
Status: DISCONTINUED | OUTPATIENT
Start: 2021-07-24 | End: 2021-07-23

## 2021-07-23 RX ORDER — LORAZEPAM 2 MG/ML
3 INJECTION INTRAMUSCULAR
Status: DISCONTINUED | OUTPATIENT
Start: 2021-07-23 | End: 2021-07-24

## 2021-07-23 RX ORDER — 0.9 % SODIUM CHLORIDE 0.9 %
2 VIAL (ML) INJECTION EVERY 12 HOURS SCHEDULED
Status: DISCONTINUED | OUTPATIENT
Start: 2021-07-23 | End: 2021-08-03 | Stop reason: HOSPADM

## 2021-07-23 RX ORDER — LORAZEPAM 2 MG/ML
4 INJECTION INTRAMUSCULAR
Status: DISCONTINUED | OUTPATIENT
Start: 2021-07-23 | End: 2021-07-24

## 2021-07-23 RX ADMIN — SODIUM CHLORIDE: 0.9 INJECTION, SOLUTION INTRAVENOUS at 23:28

## 2021-07-23 RX ADMIN — FOLIC ACID 1 MG: 1 TABLET ORAL at 16:41

## 2021-07-23 RX ADMIN — GABAPENTIN 300 MG: 300 CAPSULE ORAL at 23:07

## 2021-07-23 RX ADMIN — SODIUM CHLORIDE, PRESERVATIVE FREE 2 ML: 5 INJECTION INTRAVENOUS at 23:07

## 2021-07-23 RX ADMIN — THIAMINE HCL TAB 100 MG 100 MG: 100 TAB at 16:40

## 2021-07-23 ASSESSMENT — PATIENT HEALTH QUESTIONNAIRE - PHQ9
SUM OF ALL RESPONSES TO PHQ9 QUESTIONS 1 AND 2: 5
CLINICAL INTERPRETATION OF PHQ2 SCORE: FURTHER SCREENING NEEDED
SUM OF ALL RESPONSES TO PHQ9 QUESTIONS 1 AND 2: 5
8. MOVING OR SPEAKING SO SLOWLY THAT OTHER PEOPLE COULD HAVE NOTICED. OR THE OPPOSITE, BEING SO FIGETY OR RESTLESS THAT YOU HAVE BEEN MOVING AROUND A LOT MORE THAN USUAL: SEVERAL DAYS
9. THOUGHTS THAT YOU WOULD BE BETTER OFF DEAD, OR OF HURTING YOURSELF: NOT AT ALL
10. IF YOU CHECKED OFF ANY PROBLEMS, HOW DIFFICULT HAVE THESE PROBLEMS MADE IT FOR YOU TO DO YOUR WORK, TAKE CARE OF THINGS AT HOME, OR GET ALONG WITH OTHER PEOPLE: SOMEWHAT DIFFICULT
5. POOR APPETITE OR OVEREATING: NOT AT ALL
CLINICAL INTERPRETATION OF PHQ9 SCORE: FURTHER SCREENING NEEDED
3. TROUBLE FALLING OR STAYING ASLEEP OR SLEEPING TOO MUCH: MORE THAN HALF THE DAYS
2. FEELING DOWN, DEPRESSED OR HOPELESS: NEARLY EVERY DAY
CLINICAL INTERPRETATION OF PHQ9 SCORE: MODERATE DEPRESSION
4. FEELING TIRED OR HAVING LITTLE ENERGY: MORE THAN HALF THE DAYS
CLINICAL INTERPRETATION OF PHQ2 SCORE: MODERATE DEPRESSION
SUM OF ALL RESPONSES TO PHQ QUESTIONS 1-9: 13
SUM OF ALL RESPONSES TO PHQ9 QUESTIONS 1 TO 9: 13
6. FEELING BAD ABOUT YOURSELF - OR THAT YOU ARE A FAILURE OR HAVE LET YOURSELF OR YOUR FAMILY DOWN: MORE THAN HALF THE DAYS
IS PATIENT ABLE TO COMPLETE PHQ2 OR PHQ9: YES
1. LITTLE INTEREST OR PLEASURE IN DOING THINGS: MORE THAN HALF THE DAYS
7. TROUBLE CONCENTRATING ON THINGS, SUCH AS READING THE NEWSPAPER OR WATCHING TELEVISION: SEVERAL DAYS

## 2021-07-23 ASSESSMENT — COLUMBIA-SUICIDE SEVERITY RATING SCALE - C-SSRS
1. WITHIN THE PAST MONTH, HAVE YOU WISHED YOU WERE DEAD OR WISHED YOU COULD GO TO SLEEP AND NOT WAKE UP?: NO
6. HAVE YOU EVER DONE ANYTHING, STARTED TO DO ANYTHING, OR PREPARED TO DO ANYTHING TO END YOUR LIFE?: NO
2. HAVE YOU ACTUALLY HAD ANY THOUGHTS OF KILLING YOURSELF?: NO
IS THE PATIENT ABLE TO COMPLETE C-SSRS: YES

## 2021-07-23 ASSESSMENT — ACTIVITIES OF DAILY LIVING (ADL)
ADL_SHORT_OF_BREATH: NO
TRANSFERRING: NEEDS ASSISTANCE
CONTINENCE: NEEDS ASSISTANCE
ADL_SCORE: 6
DRESSING YOURSELF: NEEDS ASSISTANCE
CHRONIC_PAIN_PRESENT: NO
FEEDING YOURSELF: NEEDS ASSISTANCE
BATHING: NEEDS ASSISTANCE
ADL_BEFORE_ADMISSION: NEEDS/REQUIRES ASSISTANCE
RECENT_DECLINE_ADL: YES, DECLINE IN BATHING/DRESSING/FEEDING, COLLABORATE WITH PROVIDER (T);YES, DECLINE IN AMBULATION/TRANSFERRING, COLLABORATE WITH PROVIDER (T)
MOBILITY_ASSIST_DEVICES: STANDARD WALKER;CANE
TOILETING: NEEDS ASSISTANCE

## 2021-07-23 ASSESSMENT — LIFESTYLE VARIABLES
TREMOR: NO TREMOR
VISUAL DISTURBANCES: NOT PRESENT
AUDITORY DISTURBANCES: NOT PRESENT
HISTORY OF PROBLEMS WHEN YOU STOP DRINKING ALCOHOL: HALLUCINATIONS;SEIZURE;HOSPITALIZATION
HEADACHE, FULLNESS IN HEAD: NOT PRESENT
TACTILE DISTURBANCES: VERY MILD ITCHING, PINS AND NEEDLES BURNING OR NUMBNESS
ANXIETY: MILDLY ANXIOUS
ANXIETY: MILDLY ANXIOUS
HOW OFTEN DO YOU HAVE 6 OR MORE DRINKS ON ONE OCCASION: DAILY OR ALMOST DAILY
HOW MANY STANDARD DRINKS CONTAINING ALCOHOL DO YOU HAVE ON A TYPICAL DAY: 10 OR MORE
LAST DRINK YOU HAD: 48 HOURS OR LESS
AUDITORY DISTURBANCES: NOT PRESENT
AGITATION: NORMAL ACTIVITY
PAROXYSMAL SWEATS: NO SWEAT VISIBLE
HOW OFTEN DO YOU HAVE A DRINK CONTAINING ALCOHOL: 4 OR MORE TIMES PER WEEK
AUDIT-C TOTAL SCORE: 12
TREMOR: NOT VISIBLE, BUT CAN BE FELT FINGERTIP TO FINGERTIP
PAROXYSMAL SWEATS: BARELY PERCEPTIBLE SWEATING, PALMS MOIST
PRIOR ALCOHOL TREATMENT: NO
HEADACHE, FULLNESS IN HEAD: NOT PRESENT
ALCOHOL_USE_STATUS: UNHEALTHY DRINKING IDENTIFIED. AUDIT C: 3 OR MORE FOR WOMEN AND 4 OR MORE FOR MEN.
NAUSEA AND VOMITING: NO NAUSEA AND NO VOMITING
VISUAL DISTURBANCES: NOT PRESENT
NAUSEA AND VOMITING: NO NAUSEA AND NO VOMITING
AGITATION: NORMAL ACTIVITY

## 2021-07-23 ASSESSMENT — COGNITIVE AND FUNCTIONAL STATUS - GENERAL
BECAUSE OF A PHYSICAL, MENTAL, OR EMOTIONAL CONDITION, DO YOU HAVE SERIOUS DIFFICULTY CONCENTRATING, REMEMBERING OR MAKING DECISIONS: YES
ARE YOU DEAF OR DO YOU HAVE SERIOUS DIFFICULTY  HEARING: NO
ARE YOU BLIND OR DO YOU HAVE SERIOUS DIFFICULTY SEEING, EVEN WHEN WEARING GLASSES: NO
BECAUSE OF A PHYSICAL, MENTAL, OR EMOTIONAL CONDITION, DO YOU HAVE DIFFICULTY DOING ERRANDS ALONE: YES
DO YOU HAVE DIFFICULTY DRESSING OR BATHING: YES
DO YOU HAVE SERIOUS DIFFICULTY WALKING OR CLIMBING STAIRS: YES

## 2021-07-23 ASSESSMENT — PAIN SCALES - GENERAL: PAINLEVEL_OUTOF10: 0

## 2021-07-24 LAB
ALBUMIN SERPL-MCNC: 3.7 G/DL (ref 3.6–5.1)
ALBUMIN/GLOB SERPL: 1.2 {RATIO} (ref 1–2.4)
ALP SERPL-CCNC: 116 UNITS/L (ref 45–117)
ALT SERPL-CCNC: 85 UNITS/L
ANION GAP SERPL CALC-SCNC: 9 MMOL/L (ref 10–20)
AST SERPL-CCNC: 92 UNITS/L
ATRIAL RATE (BPM): 89
BILIRUB SERPL-MCNC: 1.1 MG/DL (ref 0.2–1)
BUN SERPL-MCNC: 12 MG/DL (ref 6–20)
BUN/CREAT SERPL: 18 (ref 7–25)
CALCIUM SERPL-MCNC: 9.6 MG/DL (ref 8.4–10.2)
CHLORIDE SERPL-SCNC: 108 MMOL/L (ref 98–107)
CO2 SERPL-SCNC: 27 MMOL/L (ref 21–32)
CREAT SERPL-MCNC: 0.66 MG/DL (ref 0.67–1.17)
DEPRECATED RDW RBC: 51 FL (ref 39–50)
ERYTHROCYTE [DISTWIDTH] IN BLOOD: 13.6 % (ref 11–15)
FASTING DURATION TIME PATIENT: ABNORMAL H
GFR SERPLBLD BASED ON 1.73 SQ M-ARVRAT: >90 ML/MIN/1.73M2
GLOBULIN SER-MCNC: 3.2 G/DL (ref 2–4)
GLUCOSE SERPL-MCNC: 96 MG/DL (ref 65–99)
HCT VFR BLD CALC: 48 % (ref 39–51)
HGB BLD-MCNC: 15.8 G/DL (ref 13–17)
MAGNESIUM SERPL-MCNC: 2 MG/DL (ref 1.7–2.4)
MCH RBC QN AUTO: 33.5 PG (ref 26–34)
MCHC RBC AUTO-ENTMCNC: 32.9 G/DL (ref 32–36.5)
MCV RBC AUTO: 101.7 FL (ref 78–100)
NRBC BLD MANUAL-RTO: 0 /100 WBC
P AXIS (DEGREES): 19
PLATELET # BLD AUTO: 197 K/MCL (ref 140–450)
POTASSIUM SERPL-SCNC: 4.1 MMOL/L (ref 3.4–5.1)
PR-INTERVAL (MSEC): 165
PROT SERPL-MCNC: 6.9 G/DL (ref 6.4–8.2)
QRS-INTERVAL (MSEC): 91
QT-INTERVAL (MSEC): 393
QTC: 481
R AXIS (DEGREES): -20
RBC # BLD: 4.72 MIL/MCL (ref 4.5–5.9)
REPORT TEXT: NORMAL
SODIUM SERPL-SCNC: 140 MMOL/L (ref 135–145)
T AXIS (DEGREES): 1
VENTRICULAR RATE EKG/MIN (BPM): 90
WBC # BLD: 7.4 K/MCL (ref 4.2–11)

## 2021-07-24 PROCEDURE — 13003289 HB OXYGEN THERAPY DAILY

## 2021-07-24 PROCEDURE — 83735 ASSAY OF MAGNESIUM: CPT | Performed by: INTERNAL MEDICINE

## 2021-07-24 PROCEDURE — 90792 PSYCH DIAG EVAL W/MED SRVCS: CPT | Performed by: PSYCHIATRY & NEUROLOGY

## 2021-07-24 PROCEDURE — 10002803 HB RX 637: Performed by: INTERNAL MEDICINE

## 2021-07-24 PROCEDURE — 10002800 HB RX 250 W HCPCS: Performed by: EMERGENCY MEDICINE

## 2021-07-24 PROCEDURE — 10004651 HB RX, NO CHARGE ITEM: Performed by: INTERNAL MEDICINE

## 2021-07-24 PROCEDURE — 10002807 HB RX 258: Performed by: FAMILY MEDICINE

## 2021-07-24 PROCEDURE — 85027 COMPLETE CBC AUTOMATED: CPT | Performed by: INTERNAL MEDICINE

## 2021-07-24 PROCEDURE — 80053 COMPREHEN METABOLIC PANEL: CPT | Performed by: INTERNAL MEDICINE

## 2021-07-24 PROCEDURE — 36415 COLL VENOUS BLD VENIPUNCTURE: CPT | Performed by: INTERNAL MEDICINE

## 2021-07-24 PROCEDURE — 93005 ELECTROCARDIOGRAM TRACING: CPT | Performed by: FAMILY MEDICINE

## 2021-07-24 PROCEDURE — 10002800 HB RX 250 W HCPCS: Performed by: INTERNAL MEDICINE

## 2021-07-24 PROCEDURE — 10002803 HB RX 637: Performed by: EMERGENCY MEDICINE

## 2021-07-24 PROCEDURE — 10002803 HB RX 637: Performed by: FAMILY MEDICINE

## 2021-07-24 PROCEDURE — 93010 ELECTROCARDIOGRAM REPORT: CPT | Performed by: INTERNAL MEDICINE

## 2021-07-24 PROCEDURE — HZ2ZZZZ DETOXIFICATION SERVICES FOR SUBSTANCE ABUSE TREATMENT: ICD-10-PCS | Performed by: EMERGENCY MEDICINE

## 2021-07-24 PROCEDURE — 10002803 HB RX 637: Performed by: PSYCHIATRY & NEUROLOGY

## 2021-07-24 PROCEDURE — 10000002 HB ROOM CHARGE MED SURG

## 2021-07-24 PROCEDURE — 99233 SBSQ HOSP IP/OBS HIGH 50: CPT | Performed by: FAMILY MEDICINE

## 2021-07-24 RX ORDER — BISACODYL 10 MG
10 SUPPOSITORY, RECTAL RECTAL DAILY PRN
Status: DISCONTINUED | OUTPATIENT
Start: 2021-07-24 | End: 2021-08-03 | Stop reason: HOSPADM

## 2021-07-24 RX ORDER — ACETAMINOPHEN 325 MG/1
650 TABLET ORAL EVERY 4 HOURS PRN
Status: DISCONTINUED | OUTPATIENT
Start: 2021-07-24 | End: 2021-08-03 | Stop reason: HOSPADM

## 2021-07-24 RX ORDER — LOPERAMIDE HYDROCHLORIDE 2 MG/1
2 CAPSULE ORAL EVERY 6 HOURS PRN
Status: DISCONTINUED | OUTPATIENT
Start: 2021-07-24 | End: 2021-08-03 | Stop reason: HOSPADM

## 2021-07-24 RX ORDER — CALCIUM CARBONATE 500 MG/1
500 TABLET, CHEWABLE ORAL EVERY 4 HOURS PRN
Status: DISCONTINUED | OUTPATIENT
Start: 2021-07-24 | End: 2021-08-03 | Stop reason: HOSPADM

## 2021-07-24 RX ORDER — GUAIFENESIN/DEXTROMETHORPHAN 100-10MG/5
10 SYRUP ORAL EVERY 4 HOURS PRN
Status: DISCONTINUED | OUTPATIENT
Start: 2021-07-24 | End: 2021-08-03 | Stop reason: HOSPADM

## 2021-07-24 RX ORDER — ONDANSETRON 2 MG/ML
4 INJECTION INTRAMUSCULAR; INTRAVENOUS EVERY 6 HOURS PRN
Status: DISCONTINUED | OUTPATIENT
Start: 2021-07-24 | End: 2021-08-03 | Stop reason: HOSPADM

## 2021-07-24 RX ORDER — CLONAZEPAM 0.5 MG/1
0.5 TABLET ORAL 3 TIMES DAILY
Status: DISCONTINUED | OUTPATIENT
Start: 2021-07-24 | End: 2021-07-29

## 2021-07-24 RX ORDER — LANOLIN ALCOHOL/MO/W.PET/CERES
3 CREAM (GRAM) TOPICAL NIGHTLY PRN
Status: DISCONTINUED | OUTPATIENT
Start: 2021-07-24 | End: 2021-08-03 | Stop reason: HOSPADM

## 2021-07-24 RX ORDER — AMOXICILLIN 250 MG
1 CAPSULE ORAL 2 TIMES DAILY PRN
Status: DISCONTINUED | OUTPATIENT
Start: 2021-07-24 | End: 2021-08-03 | Stop reason: HOSPADM

## 2021-07-24 RX ORDER — SIMETHICONE 125 MG
125 TABLET,CHEWABLE ORAL 4 TIMES DAILY PRN
Status: DISCONTINUED | OUTPATIENT
Start: 2021-07-24 | End: 2021-08-03 | Stop reason: HOSPADM

## 2021-07-24 RX ORDER — ALBUTEROL SULFATE 90 UG/1
2 AEROSOL, METERED RESPIRATORY (INHALATION)
Status: DISCONTINUED | OUTPATIENT
Start: 2021-07-24 | End: 2021-08-03 | Stop reason: HOSPADM

## 2021-07-24 RX ADMIN — LORAZEPAM 2 MG: 2 INJECTION INTRAMUSCULAR; INTRAVENOUS at 21:26

## 2021-07-24 RX ADMIN — Medication 3 MG: at 21:19

## 2021-07-24 RX ADMIN — LORAZEPAM 2 MG: 2 INJECTION INTRAMUSCULAR; INTRAVENOUS at 18:46

## 2021-07-24 RX ADMIN — GABAPENTIN 300 MG: 300 CAPSULE ORAL at 14:52

## 2021-07-24 RX ADMIN — LORAZEPAM 2 MG: 2 INJECTION INTRAMUSCULAR; INTRAVENOUS at 05:54

## 2021-07-24 RX ADMIN — ENOXAPARIN SODIUM 40 MG: 100 INJECTION SUBCUTANEOUS at 08:35

## 2021-07-24 RX ADMIN — LORAZEPAM 3 MG: 2 INJECTION INTRAMUSCULAR; INTRAVENOUS at 06:58

## 2021-07-24 RX ADMIN — FOLIC ACID 1 MG: 1 TABLET ORAL at 08:35

## 2021-07-24 RX ADMIN — SODIUM CHLORIDE: 0.9 INJECTION, SOLUTION INTRAVENOUS at 22:27

## 2021-07-24 RX ADMIN — SODIUM CHLORIDE, PRESERVATIVE FREE 2 ML: 5 INJECTION INTRAVENOUS at 08:36

## 2021-07-24 RX ADMIN — CLONAZEPAM 0.5 MG: 0.5 TABLET ORAL at 20:17

## 2021-07-24 RX ADMIN — CLONAZEPAM 0.5 MG: 0.5 TABLET ORAL at 14:52

## 2021-07-24 RX ADMIN — LORAZEPAM 2 MG: 2 INJECTION INTRAMUSCULAR; INTRAVENOUS at 08:35

## 2021-07-24 RX ADMIN — ATORVASTATIN CALCIUM 20 MG: 20 TABLET, FILM COATED ORAL at 08:35

## 2021-07-24 RX ADMIN — THIAMINE HCL TAB 100 MG 100 MG: 100 TAB at 08:35

## 2021-07-24 RX ADMIN — LORAZEPAM 2 MG: 2 INJECTION INTRAMUSCULAR; INTRAVENOUS at 00:50

## 2021-07-24 RX ADMIN — GABAPENTIN 300 MG: 300 CAPSULE ORAL at 21:19

## 2021-07-24 RX ADMIN — GABAPENTIN 300 MG: 300 CAPSULE ORAL at 05:32

## 2021-07-24 RX ADMIN — SODIUM CHLORIDE, PRESERVATIVE FREE 2 ML: 5 INJECTION INTRAVENOUS at 20:17

## 2021-07-24 ASSESSMENT — LIFESTYLE VARIABLES
HEADACHE, FULLNESS IN HEAD: NOT PRESENT
ANXIETY: MILDLY ANXIOUS
TREMOR: 2
AGITATION: SOMEWHAT MORE THAN NORMAL ACTIVITY
HEADACHE, FULLNESS IN HEAD: NOT PRESENT
PAROXYSMAL SWEATS: 2
ANXIETY: MILDLY ANXIOUS
VISUAL DISTURBANCES: NOT PRESENT
TACTILE DISTURBANCES: VERY MILD ITCHING, PINS AND NEEDLES BURNING OR NUMBNESS
VISUAL DISTURBANCES: VERY MILD SENSITIVITY
AUDITORY DISTURBANCES: NOT PRESENT
NAUSEA AND VOMITING: NO NAUSEA AND NO VOMITING
ANXIETY: 2
NAUSEA AND VOMITING: NO NAUSEA AND NO VOMITING
AUDITORY DISTURBANCES: NOT PRESENT
AGITATION: SOMEWHAT MORE THAN NORMAL ACTIVITY
TREMOR: NOT VISIBLE, BUT CAN BE FELT FINGERTIP TO FINGERTIP
AGITATION: NORMAL ACTIVITY
TREMOR: 2
ANXIETY: MILDLY ANXIOUS
ANXIETY: 2
VISUAL DISTURBANCES: MILD SENSITIVITY
TREMOR: 2
TREMOR: 2
AUDITORY DISTURBANCES: NOT PRESENT
TACTILE DISTURBANCES: VERY MILD ITCHING, PINS AND NEEDLES BURNING OR NUMBNESS
ANXIETY: 2
PAROXYSMAL SWEATS: BARELY PERCEPTIBLE SWEATING, PALMS MOIST
TREMOR: 2
NAUSEA AND VOMITING: NO NAUSEA AND NO VOMITING
PAROXYSMAL SWEATS: BARELY PERCEPTIBLE SWEATING, PALMS MOIST
ANXIETY: MILDLY ANXIOUS
PAROXYSMAL SWEATS: BARELY PERCEPTIBLE SWEATING, PALMS MOIST
HEADACHE, FULLNESS IN HEAD: NOT PRESENT
NAUSEA AND VOMITING: NO NAUSEA AND NO VOMITING
PAROXYSMAL SWEATS: NO SWEAT VISIBLE
AGITATION: NORMAL ACTIVITY
TREMOR: 2
PAROXYSMAL SWEATS: BARELY PERCEPTIBLE SWEATING, PALMS MOIST
VISUAL DISTURBANCES: VERY MILD SENSITIVITY
VISUAL DISTURBANCES: MILD SENSITIVITY
ANXIETY: MILDLY ANXIOUS
ANXIETY: MILDLY ANXIOUS
HEADACHE, FULLNESS IN HEAD: NOT PRESENT
AGITATION: NORMAL ACTIVITY
AUDITORY DISTURBANCES: NOT PRESENT
VISUAL DISTURBANCES: VERY MILD SENSITIVITY
VISUAL DISTURBANCES: MILD SENSITIVITY
NAUSEA AND VOMITING: NO NAUSEA AND NO VOMITING
NAUSEA AND VOMITING: NO NAUSEA AND NO VOMITING
TACTILE DISTURBANCES: VERY MILD ITCHING, PINS AND NEEDLES BURNING OR NUMBNESS
AGITATION: SOMEWHAT MORE THAN NORMAL ACTIVITY
AGITATION: SOMEWHAT MORE THAN NORMAL ACTIVITY
NAUSEA AND VOMITING: NO NAUSEA AND NO VOMITING
TREMOR: 2
PAROXYSMAL SWEATS: 2
PAROXYSMAL SWEATS: BARELY PERCEPTIBLE SWEATING, PALMS MOIST
HEADACHE, FULLNESS IN HEAD: NOT PRESENT
AGITATION: NORMAL ACTIVITY
TREMOR: 2
PAROXYSMAL SWEATS: BARELY PERCEPTIBLE SWEATING, PALMS MOIST
AUDITORY DISTURBANCES: VERY MILD HARSHNESS OR ABILITY TO FRIGHTEN
AUDITORY DISTURBANCES: VERY MILD HARSHNESS OR ABILITY TO FRIGHTEN
NAUSEA AND VOMITING: NO NAUSEA AND NO VOMITING
HEADACHE, FULLNESS IN HEAD: NOT PRESENT
AUDITORY DISTURBANCES: NOT PRESENT
VISUAL DISTURBANCES: MILD SENSITIVITY
NAUSEA AND VOMITING: NO NAUSEA AND NO VOMITING
AGITATION: NORMAL ACTIVITY
AGITATION: SOMEWHAT MORE THAN NORMAL ACTIVITY
PAROXYSMAL SWEATS: BARELY PERCEPTIBLE SWEATING, PALMS MOIST
ANXIETY: MILDLY ANXIOUS
VISUAL DISTURBANCES: VERY MILD SENSITIVITY
AUDITORY DISTURBANCES: VERY MILD HARSHNESS OR ABILITY TO FRIGHTEN
AUDITORY DISTURBANCES: VERY MILD HARSHNESS OR ABILITY TO FRIGHTEN
PAROXYSMAL SWEATS: BARELY PERCEPTIBLE SWEATING, PALMS MOIST
HEADACHE, FULLNESS IN HEAD: NOT PRESENT
AUDITORY DISTURBANCES: NOT PRESENT
AGITATION: SOMEWHAT MORE THAN NORMAL ACTIVITY
TREMOR: NOT VISIBLE, BUT CAN BE FELT FINGERTIP TO FINGERTIP
AGITATION: SOMEWHAT MORE THAN NORMAL ACTIVITY
HEADACHE, FULLNESS IN HEAD: NOT PRESENT
AUDITORY DISTURBANCES: NOT PRESENT
ANXIETY: MILDLY ANXIOUS
VISUAL DISTURBANCES: NOT PRESENT
TREMOR: NOT VISIBLE, BUT CAN BE FELT FINGERTIP TO FINGERTIP
HEADACHE, FULLNESS IN HEAD: NOT PRESENT
VISUAL DISTURBANCES: NOT PRESENT
VISUAL DISTURBANCES: NOT PRESENT
ANXIETY: MILDLY ANXIOUS
TACTILE DISTURBANCES: VERY MILD ITCHING, PINS AND NEEDLES BURNING OR NUMBNESS
NAUSEA AND VOMITING: NO NAUSEA AND NO VOMITING
AUDITORY DISTURBANCES: NOT PRESENT
TREMOR: NOT VISIBLE, BUT CAN BE FELT FINGERTIP TO FINGERTIP
HEADACHE, FULLNESS IN HEAD: NOT PRESENT
PAROXYSMAL SWEATS: BARELY PERCEPTIBLE SWEATING, PALMS MOIST

## 2021-07-24 ASSESSMENT — PAIN SCALES - GENERAL
PAINLEVEL_OUTOF10: 0
PAINLEVEL_OUTOF10: 0

## 2021-07-25 LAB
25(OH)D3+25(OH)D2 SERPL-MCNC: 23.5 NG/ML (ref 30–100)
ANION GAP SERPL CALC-SCNC: 11 MMOL/L (ref 10–20)
BUN SERPL-MCNC: 8 MG/DL (ref 6–20)
BUN/CREAT SERPL: 11 (ref 7–25)
CALCIUM SERPL-MCNC: 9.9 MG/DL (ref 8.4–10.2)
CHLORIDE SERPL-SCNC: 110 MMOL/L (ref 98–107)
CO2 SERPL-SCNC: 25 MMOL/L (ref 21–32)
CREAT SERPL-MCNC: 0.74 MG/DL (ref 0.67–1.17)
FASTING DURATION TIME PATIENT: ABNORMAL H
GFR SERPLBLD BASED ON 1.73 SQ M-ARVRAT: >90 ML/MIN/1.73M2
GLUCOSE SERPL-MCNC: 109 MG/DL (ref 65–99)
MAGNESIUM SERPL-MCNC: 2 MG/DL (ref 1.7–2.4)
PHOSPHATE SERPL-MCNC: 2.5 MG/DL (ref 2.4–4.7)
POTASSIUM SERPL-SCNC: 3.8 MMOL/L (ref 3.4–5.1)
RAINBOW EXTRA TUBES HOLD SPECIMEN: NORMAL
SODIUM SERPL-SCNC: 142 MMOL/L (ref 135–145)

## 2021-07-25 PROCEDURE — 97530 THERAPEUTIC ACTIVITIES: CPT

## 2021-07-25 PROCEDURE — 84100 ASSAY OF PHOSPHORUS: CPT | Performed by: FAMILY MEDICINE

## 2021-07-25 PROCEDURE — 10002800 HB RX 250 W HCPCS: Performed by: INTERNAL MEDICINE

## 2021-07-25 PROCEDURE — 10002800 HB RX 250 W HCPCS: Performed by: NURSE PRACTITIONER

## 2021-07-25 PROCEDURE — 10004651 HB RX, NO CHARGE ITEM: Performed by: INTERNAL MEDICINE

## 2021-07-25 PROCEDURE — 10002807 HB RX 258: Performed by: FAMILY MEDICINE

## 2021-07-25 PROCEDURE — 99233 SBSQ HOSP IP/OBS HIGH 50: CPT | Performed by: FAMILY MEDICINE

## 2021-07-25 PROCEDURE — 36415 COLL VENOUS BLD VENIPUNCTURE: CPT | Performed by: FAMILY MEDICINE

## 2021-07-25 PROCEDURE — 97163 PT EVAL HIGH COMPLEX 45 MIN: CPT

## 2021-07-25 PROCEDURE — 10002803 HB RX 637: Performed by: PSYCHIATRY & NEUROLOGY

## 2021-07-25 PROCEDURE — 10002803 HB RX 637: Performed by: INTERNAL MEDICINE

## 2021-07-25 PROCEDURE — 10000002 HB ROOM CHARGE MED SURG

## 2021-07-25 PROCEDURE — 80048 BASIC METABOLIC PNL TOTAL CA: CPT | Performed by: FAMILY MEDICINE

## 2021-07-25 PROCEDURE — 83735 ASSAY OF MAGNESIUM: CPT | Performed by: FAMILY MEDICINE

## 2021-07-25 PROCEDURE — 10002800 HB RX 250 W HCPCS: Performed by: EMERGENCY MEDICINE

## 2021-07-25 PROCEDURE — 82306 VITAMIN D 25 HYDROXY: CPT | Performed by: FAMILY MEDICINE

## 2021-07-25 PROCEDURE — 10002803 HB RX 637: Performed by: EMERGENCY MEDICINE

## 2021-07-25 PROCEDURE — 10002803 HB RX 637: Performed by: FAMILY MEDICINE

## 2021-07-25 RX ORDER — HYDRALAZINE HYDROCHLORIDE 20 MG/ML
10 INJECTION INTRAMUSCULAR; INTRAVENOUS EVERY 6 HOURS PRN
Status: DISCONTINUED | OUTPATIENT
Start: 2021-07-25 | End: 2021-08-03 | Stop reason: HOSPADM

## 2021-07-25 RX ADMIN — LORAZEPAM 2 MG: 2 INJECTION INTRAMUSCULAR; INTRAVENOUS at 09:01

## 2021-07-25 RX ADMIN — FOLIC ACID 1 MG: 1 TABLET ORAL at 09:01

## 2021-07-25 RX ADMIN — LORAZEPAM 2 MG: 2 INJECTION INTRAMUSCULAR; INTRAVENOUS at 04:12

## 2021-07-25 RX ADMIN — THIAMINE HCL TAB 100 MG 100 MG: 100 TAB at 09:01

## 2021-07-25 RX ADMIN — HYDRALAZINE HYDROCHLORIDE 10 MG: 20 INJECTION, SOLUTION INTRAMUSCULAR; INTRAVENOUS at 02:34

## 2021-07-25 RX ADMIN — SODIUM CHLORIDE, PRESERVATIVE FREE 2 ML: 5 INJECTION INTRAVENOUS at 09:06

## 2021-07-25 RX ADMIN — Medication 25 MCG: at 14:38

## 2021-07-25 RX ADMIN — CLONAZEPAM 0.5 MG: 0.5 TABLET ORAL at 09:01

## 2021-07-25 RX ADMIN — CLONAZEPAM 0.5 MG: 0.5 TABLET ORAL at 21:07

## 2021-07-25 RX ADMIN — LORAZEPAM 2 MG: 2 INJECTION INTRAMUSCULAR; INTRAVENOUS at 12:27

## 2021-07-25 RX ADMIN — GABAPENTIN 300 MG: 300 CAPSULE ORAL at 21:07

## 2021-07-25 RX ADMIN — LORAZEPAM 2 MG: 2 INJECTION INTRAMUSCULAR; INTRAVENOUS at 01:10

## 2021-07-25 RX ADMIN — SODIUM CHLORIDE: 0.9 INJECTION, SOLUTION INTRAVENOUS at 01:22

## 2021-07-25 RX ADMIN — LORAZEPAM 2 MG: 2 INJECTION INTRAMUSCULAR; INTRAVENOUS at 17:12

## 2021-07-25 RX ADMIN — LORAZEPAM 2 MG: 2 INJECTION INTRAMUSCULAR; INTRAVENOUS at 03:10

## 2021-07-25 RX ADMIN — SODIUM CHLORIDE, PRESERVATIVE FREE 2 ML: 5 INJECTION INTRAVENOUS at 21:08

## 2021-07-25 RX ADMIN — GABAPENTIN 300 MG: 300 CAPSULE ORAL at 05:19

## 2021-07-25 RX ADMIN — LORAZEPAM 2 MG: 2 INJECTION INTRAMUSCULAR; INTRAVENOUS at 23:38

## 2021-07-25 RX ADMIN — ATORVASTATIN CALCIUM 20 MG: 20 TABLET, FILM COATED ORAL at 09:01

## 2021-07-25 RX ADMIN — LORAZEPAM 2 MG: 2 INJECTION INTRAMUSCULAR; INTRAVENOUS at 18:58

## 2021-07-25 RX ADMIN — Medication 3 MG: at 23:40

## 2021-07-25 RX ADMIN — CLONAZEPAM 0.5 MG: 0.5 TABLET ORAL at 14:38

## 2021-07-25 RX ADMIN — GABAPENTIN 300 MG: 300 CAPSULE ORAL at 14:38

## 2021-07-25 RX ADMIN — LORAZEPAM 2 MG: 2 INJECTION INTRAMUSCULAR; INTRAVENOUS at 06:32

## 2021-07-25 RX ADMIN — ENOXAPARIN SODIUM 40 MG: 100 INJECTION SUBCUTANEOUS at 09:01

## 2021-07-25 ASSESSMENT — LIFESTYLE VARIABLES
NAUSEA AND VOMITING: NO NAUSEA AND NO VOMITING
HEADACHE, FULLNESS IN HEAD: NOT PRESENT
HEADACHE, FULLNESS IN HEAD: NOT PRESENT
TREMOR: 2
NAUSEA AND VOMITING: NO NAUSEA AND NO VOMITING
AUDITORY DISTURBANCES: NOT PRESENT
ANXIETY: MILDLY ANXIOUS
TACTILE DISTURBANCES: VERY MILD ITCHING, PINS AND NEEDLES BURNING OR NUMBNESS
HEADACHE, FULLNESS IN HEAD: NOT PRESENT
AUDITORY DISTURBANCES: NOT PRESENT
PAROXYSMAL SWEATS: BARELY PERCEPTIBLE SWEATING, PALMS MOIST
TREMOR: NOT VISIBLE, BUT CAN BE FELT FINGERTIP TO FINGERTIP
PAROXYSMAL SWEATS: BARELY PERCEPTIBLE SWEATING, PALMS MOIST
AUDITORY DISTURBANCES: VERY MILD HARSHNESS OR ABILITY TO FRIGHTEN
HEADACHE, FULLNESS IN HEAD: NOT PRESENT
AUDITORY DISTURBANCES: NOT PRESENT
AGITATION: SOMEWHAT MORE THAN NORMAL ACTIVITY
VISUAL DISTURBANCES: NOT PRESENT
VISUAL DISTURBANCES: NOT PRESENT
HEADACHE, FULLNESS IN HEAD: NOT PRESENT
ANXIETY: 2
HEADACHE, FULLNESS IN HEAD: NOT PRESENT
AGITATION: SOMEWHAT MORE THAN NORMAL ACTIVITY
VISUAL DISTURBANCES: VERY MILD SENSITIVITY
VISUAL DISTURBANCES: VERY MILD SENSITIVITY
TREMOR: NOT VISIBLE, BUT CAN BE FELT FINGERTIP TO FINGERTIP
ANXIETY: MILDLY ANXIOUS
NAUSEA AND VOMITING: NO NAUSEA AND NO VOMITING
NAUSEA AND VOMITING: NO NAUSEA AND NO VOMITING
ANXIETY: MILDLY ANXIOUS
ANXIETY: MILDLY ANXIOUS
AGITATION: SOMEWHAT MORE THAN NORMAL ACTIVITY
AGITATION: SOMEWHAT MORE THAN NORMAL ACTIVITY
HEADACHE, FULLNESS IN HEAD: NOT PRESENT
AGITATION: NORMAL ACTIVITY
AGITATION: SOMEWHAT MORE THAN NORMAL ACTIVITY
TREMOR: 2
AUDITORY DISTURBANCES: NOT PRESENT
PAROXYSMAL SWEATS: 2
TREMOR: 2
AUDITORY DISTURBANCES: NOT PRESENT
VISUAL DISTURBANCES: NOT PRESENT
NAUSEA AND VOMITING: NO NAUSEA AND NO VOMITING
AGITATION: SOMEWHAT MORE THAN NORMAL ACTIVITY
ANXIETY: MILDLY ANXIOUS
TREMOR: 2
NAUSEA AND VOMITING: NO NAUSEA AND NO VOMITING
PAROXYSMAL SWEATS: BARELY PERCEPTIBLE SWEATING, PALMS MOIST
NAUSEA AND VOMITING: NO NAUSEA AND NO VOMITING
NAUSEA AND VOMITING: NO NAUSEA AND NO VOMITING
ANXIETY: MILDLY ANXIOUS
PAROXYSMAL SWEATS: BARELY PERCEPTIBLE SWEATING, PALMS MOIST
NAUSEA AND VOMITING: NO NAUSEA AND NO VOMITING
HEADACHE, FULLNESS IN HEAD: NOT PRESENT
AGITATION: NORMAL ACTIVITY
TREMOR: NOT VISIBLE, BUT CAN BE FELT FINGERTIP TO FINGERTIP
ANXIETY: MILDLY ANXIOUS
HEADACHE, FULLNESS IN HEAD: NOT PRESENT
PAROXYSMAL SWEATS: BARELY PERCEPTIBLE SWEATING, PALMS MOIST
AUDITORY DISTURBANCES: NOT PRESENT
PAROXYSMAL SWEATS: BARELY PERCEPTIBLE SWEATING, PALMS MOIST
VISUAL DISTURBANCES: VERY MILD SENSITIVITY
ANXIETY: MILDLY ANXIOUS
AUDITORY DISTURBANCES: NOT PRESENT
AGITATION: 2
TACTILE DISTURBANCES: VERY MILD ITCHING, PINS AND NEEDLES BURNING OR NUMBNESS
AUDITORY DISTURBANCES: NOT PRESENT
TREMOR: 2
TREMOR: NOT VISIBLE, BUT CAN BE FELT FINGERTIP TO FINGERTIP
VISUAL DISTURBANCES: NOT PRESENT
VISUAL DISTURBANCES: VERY MILD SENSITIVITY
VISUAL DISTURBANCES: NOT PRESENT

## 2021-07-25 ASSESSMENT — PAIN SCALES - GENERAL
PAINLEVEL_OUTOF10: 0
PAINLEVEL_OUTOF10: 0

## 2021-07-25 ASSESSMENT — COGNITIVE AND FUNCTIONAL STATUS - GENERAL
BASIC_MOBILITY_RAW_SCORE: 17
BASIC_MOBILITY_CONVERTED_SCORE: 39.67

## 2021-07-26 ENCOUNTER — TELEPHONE (OUTPATIENT)
Dept: INTERNAL MEDICINE CLINIC | Facility: CLINIC | Age: 71
End: 2021-07-26

## 2021-07-26 PROCEDURE — 10004651 HB RX, NO CHARGE ITEM: Performed by: INTERNAL MEDICINE

## 2021-07-26 PROCEDURE — 10002803 HB RX 637: Performed by: PSYCHIATRY & NEUROLOGY

## 2021-07-26 PROCEDURE — 97535 SELF CARE MNGMENT TRAINING: CPT

## 2021-07-26 PROCEDURE — 10000002 HB ROOM CHARGE MED SURG

## 2021-07-26 PROCEDURE — 97165 OT EVAL LOW COMPLEX 30 MIN: CPT

## 2021-07-26 PROCEDURE — 10002800 HB RX 250 W HCPCS: Performed by: NURSE PRACTITIONER

## 2021-07-26 PROCEDURE — 99233 SBSQ HOSP IP/OBS HIGH 50: CPT | Performed by: FAMILY MEDICINE

## 2021-07-26 PROCEDURE — 10002803 HB RX 637: Performed by: FAMILY MEDICINE

## 2021-07-26 PROCEDURE — 10002803 HB RX 637: Performed by: INTERNAL MEDICINE

## 2021-07-26 PROCEDURE — 10002800 HB RX 250 W HCPCS: Performed by: INTERNAL MEDICINE

## 2021-07-26 PROCEDURE — 10002800 HB RX 250 W HCPCS: Performed by: EMERGENCY MEDICINE

## 2021-07-26 PROCEDURE — 99232 SBSQ HOSP IP/OBS MODERATE 35: CPT | Performed by: PSYCHIATRY & NEUROLOGY

## 2021-07-26 RX ORDER — AMLODIPINE BESYLATE 5 MG/1
5 TABLET ORAL DAILY
Status: DISCONTINUED | OUTPATIENT
Start: 2021-07-26 | End: 2021-08-03 | Stop reason: HOSPADM

## 2021-07-26 RX ADMIN — CLONAZEPAM 0.5 MG: 0.5 TABLET ORAL at 20:43

## 2021-07-26 RX ADMIN — LORAZEPAM 2 MG: 2 INJECTION INTRAMUSCULAR; INTRAVENOUS at 07:49

## 2021-07-26 RX ADMIN — Medication 3 MG: at 20:55

## 2021-07-26 RX ADMIN — SODIUM CHLORIDE, PRESERVATIVE FREE 2 ML: 5 INJECTION INTRAVENOUS at 09:42

## 2021-07-26 RX ADMIN — LORAZEPAM 2 MG: 2 INJECTION INTRAMUSCULAR; INTRAVENOUS at 20:43

## 2021-07-26 RX ADMIN — LORAZEPAM 2 MG: 2 INJECTION INTRAMUSCULAR; INTRAVENOUS at 11:01

## 2021-07-26 RX ADMIN — GABAPENTIN 300 MG: 300 CAPSULE ORAL at 12:49

## 2021-07-26 RX ADMIN — ENOXAPARIN SODIUM 40 MG: 100 INJECTION SUBCUTANEOUS at 08:07

## 2021-07-26 RX ADMIN — LORAZEPAM 2 MG: 2 INJECTION INTRAMUSCULAR; INTRAVENOUS at 02:58

## 2021-07-26 RX ADMIN — SODIUM CHLORIDE, PRESERVATIVE FREE 2 ML: 5 INJECTION INTRAVENOUS at 21:03

## 2021-07-26 RX ADMIN — GABAPENTIN 400 MG: 100 CAPSULE ORAL at 21:37

## 2021-07-26 RX ADMIN — LORAZEPAM 2 MG: 2 INJECTION INTRAMUSCULAR; INTRAVENOUS at 04:19

## 2021-07-26 RX ADMIN — LORAZEPAM 2 MG: 2 INJECTION INTRAMUSCULAR; INTRAVENOUS at 22:19

## 2021-07-26 RX ADMIN — AMLODIPINE BESYLATE 5 MG: 5 TABLET ORAL at 12:49

## 2021-07-26 RX ADMIN — CLONAZEPAM 0.5 MG: 0.5 TABLET ORAL at 08:08

## 2021-07-26 RX ADMIN — LORAZEPAM 2 MG: 2 INJECTION INTRAMUSCULAR; INTRAVENOUS at 14:36

## 2021-07-26 RX ADMIN — LORAZEPAM 2 MG: 2 INJECTION INTRAMUSCULAR; INTRAVENOUS at 23:24

## 2021-07-26 RX ADMIN — CLONAZEPAM 0.5 MG: 0.5 TABLET ORAL at 12:49

## 2021-07-26 RX ADMIN — LORAZEPAM 2 MG: 2 INJECTION INTRAMUSCULAR; INTRAVENOUS at 12:49

## 2021-07-26 RX ADMIN — HYDRALAZINE HYDROCHLORIDE 10 MG: 20 INJECTION, SOLUTION INTRAMUSCULAR; INTRAVENOUS at 04:20

## 2021-07-26 RX ADMIN — LORAZEPAM 2 MG: 2 INJECTION INTRAMUSCULAR; INTRAVENOUS at 15:49

## 2021-07-26 RX ADMIN — Medication 25 MCG: at 08:08

## 2021-07-26 RX ADMIN — GABAPENTIN 300 MG: 300 CAPSULE ORAL at 05:07

## 2021-07-26 RX ADMIN — LORAZEPAM 2 MG: 2 INJECTION INTRAMUSCULAR; INTRAVENOUS at 09:40

## 2021-07-26 RX ADMIN — ATORVASTATIN CALCIUM 20 MG: 20 TABLET, FILM COATED ORAL at 08:07

## 2021-07-26 ASSESSMENT — COGNITIVE AND FUNCTIONAL STATUS - GENERAL
TAKING CARE OF COMPLICATED TASKS: A LOT
HELP NEEDED FOR PERSONAL GROOMING: A LITTLE
REMEMBERING TO TAKE MEDICATION: A LOT
HELP NEEDED FOR TOILETING: A LOT
UNDERSTANDING 10 TO 15 MIN SPEECH: A LOT
FOLLOWS FAMILIAR CONVERSATION: A LITTLE
APPLIED_COGNITIVE_RAW_SCORE: 13
HELP NEEDED FOR BATHING: A LOT
REMEMBERING WHERE THINGS ARE: A LOT
APPLIED_COGNITIVE_CONVERTED_SCORE: 30.46
DAILY_ACTIVITY_CONVERTED_SCORE: 35.96
DAILY_ACTIVITY_RAW_SCORE: 16
HELP NEEDED DRESSING REGULAR UPPER BODY CLOTHING: A LITTLE
REMEMBERING 5 ERRANDS WITH NO LIST: A LOT
HELP NEEDED DRESSING REGULAR LOWER BODY CLOTHING: A LOT

## 2021-07-26 ASSESSMENT — LIFESTYLE VARIABLES
TREMOR: MODERATE, WITH PATIENT'S ARMS EXTENDED
HEADACHE, FULLNESS IN HEAD: NOT PRESENT
HEADACHE, FULLNESS IN HEAD: MILD
HEADACHE, FULLNESS IN HEAD: NOT PRESENT
AUDITORY DISTURBANCES: NOT PRESENT
VISUAL DISTURBANCES: NOT PRESENT
AGITATION: SOMEWHAT MORE THAN NORMAL ACTIVITY
NAUSEA AND VOMITING: NO NAUSEA AND NO VOMITING
TREMOR: MODERATE, WITH PATIENT'S ARMS EXTENDED
HEADACHE, FULLNESS IN HEAD: MILD
PAROXYSMAL SWEATS: BARELY PERCEPTIBLE SWEATING, PALMS MOIST
AGITATION: NORMAL ACTIVITY
TACTILE DISTURBANCES: VERY MILD ITCHING, PINS AND NEEDLES BURNING OR NUMBNESS
ANXIETY: MODERATELY ANXIOUS, OR GUARDED, SO ANXIETY IS INFERRED
AGITATION: SOMEWHAT MORE THAN NORMAL ACTIVITY
TACTILE DISTURBANCES: VERY MILD ITCHING, PINS AND NEEDLES BURNING OR NUMBNESS
NAUSEA AND VOMITING: NO NAUSEA AND NO VOMITING
NAUSEA AND VOMITING: NO NAUSEA AND NO VOMITING
AGITATION: SOMEWHAT MORE THAN NORMAL ACTIVITY
TACTILE DISTURBANCES: VERY MILD ITCHING, PINS AND NEEDLES BURNING OR NUMBNESS
VISUAL DISTURBANCES: NOT PRESENT
VISUAL DISTURBANCES: NOT PRESENT
NAUSEA AND VOMITING: NO NAUSEA AND NO VOMITING
ANXIETY: 2
AGITATION: NORMAL ACTIVITY
PAROXYSMAL SWEATS: BARELY PERCEPTIBLE SWEATING, PALMS MOIST
TREMOR: 3
AUDITORY DISTURBANCES: NOT PRESENT
PAROXYSMAL SWEATS: NO SWEAT VISIBLE
TREMOR: MODERATE, WITH PATIENT'S ARMS EXTENDED
PAROXYSMAL SWEATS: NO SWEAT VISIBLE
VISUAL DISTURBANCES: NOT PRESENT
HEADACHE, FULLNESS IN HEAD: MILD
VISUAL DISTURBANCES: NOT PRESENT
AUDITORY DISTURBANCES: NOT PRESENT
PAROXYSMAL SWEATS: 2
PAROXYSMAL SWEATS: NO SWEAT VISIBLE
TACTILE DISTURBANCES: VERY MILD ITCHING, PINS AND NEEDLES BURNING OR NUMBNESS
TREMOR: MODERATE, WITH PATIENT'S ARMS EXTENDED
PAROXYSMAL SWEATS: BARELY PERCEPTIBLE SWEATING, PALMS MOIST
TREMOR: MODERATE, WITH PATIENT'S ARMS EXTENDED
ANXIETY: MILDLY ANXIOUS
PAROXYSMAL SWEATS: 2
NAUSEA AND VOMITING: NO NAUSEA AND NO VOMITING
HEADACHE, FULLNESS IN HEAD: NOT PRESENT
TACTILE DISTURBANCES: VERY MILD ITCHING, PINS AND NEEDLES BURNING OR NUMBNESS
ANXIETY: 3
AGITATION: NORMAL ACTIVITY
NAUSEA AND VOMITING: NO NAUSEA AND NO VOMITING
HEADACHE, FULLNESS IN HEAD: NOT PRESENT
AUDITORY DISTURBANCES: NOT PRESENT
VISUAL DISTURBANCES: NOT PRESENT
HEADACHE, FULLNESS IN HEAD: NOT PRESENT
NAUSEA AND VOMITING: NO NAUSEA AND NO VOMITING
ANXIETY: MILDLY ANXIOUS
AUDITORY DISTURBANCES: NOT PRESENT
TREMOR: MODERATE, WITH PATIENT'S ARMS EXTENDED
TACTILE DISTURBANCES: VERY MILD ITCHING, PINS AND NEEDLES BURNING OR NUMBNESS
NAUSEA AND VOMITING: NO NAUSEA AND NO VOMITING
ANXIETY: MODERATELY ANXIOUS, OR GUARDED, SO ANXIETY IS INFERRED
AUDITORY DISTURBANCES: NOT PRESENT
VISUAL DISTURBANCES: NOT PRESENT
TACTILE DISTURBANCES: VERY MILD ITCHING, PINS AND NEEDLES BURNING OR NUMBNESS
AGITATION: NORMAL ACTIVITY
TREMOR: MODERATE, WITH PATIENT'S ARMS EXTENDED
AGITATION: NORMAL ACTIVITY
VISUAL DISTURBANCES: NOT PRESENT
ANXIETY: 2
ANXIETY: MODERATELY ANXIOUS, OR GUARDED, SO ANXIETY IS INFERRED

## 2021-07-26 ASSESSMENT — PAIN SCALES - GENERAL
PAINLEVEL_OUTOF10: 10
PAINLEVEL_OUTOF10: 10
PAINLEVEL_OUTOF10: 5
PAINLEVEL_OUTOF10: 0

## 2021-07-26 ASSESSMENT — ACTIVITIES OF DAILY LIVING (ADL)
PRIOR_ADL: INDEPENDENT
GROOMING: INDEPENDENT
PRIOR_ADL_TOILETING: INDEPENDENT
PRIOR_ADL_BATHING: INDEPENDENT
HOME_MANAGEMENT_TIME_ENTRY: 15
EATING: INDEPENDENT

## 2021-07-26 NOTE — TELEPHONE ENCOUNTER
Phone call transferred from phone room. S/w pt's wife and verified that she is on HIPPA release. She inquired what pneumonia vaccines pt has rec'd. Informed her that pneumovax was given on 11/1/17, and prevnar 13 was given on 11/19/18.   She verbalized u

## 2021-07-27 LAB
ALBUMIN SERPL-MCNC: 3.5 G/DL (ref 3.6–5.1)
ALP SERPL-CCNC: 107 UNITS/L (ref 45–117)
ALT SERPL-CCNC: 191 UNITS/L
ANION GAP SERPL CALC-SCNC: 12 MMOL/L (ref 10–20)
AST SERPL-CCNC: 104 UNITS/L
BILIRUB CONJ SERPL-MCNC: 0.3 MG/DL (ref 0–0.2)
BILIRUB SERPL-MCNC: 1.4 MG/DL (ref 0.2–1)
BUN SERPL-MCNC: 16 MG/DL (ref 6–20)
BUN/CREAT SERPL: 19 (ref 7–25)
CALCIUM SERPL-MCNC: 10 MG/DL (ref 8.4–10.2)
CHLORIDE SERPL-SCNC: 110 MMOL/L (ref 98–107)
CO2 SERPL-SCNC: 22 MMOL/L (ref 21–32)
CREAT SERPL-MCNC: 0.85 MG/DL (ref 0.67–1.17)
FASTING DURATION TIME PATIENT: ABNORMAL H
FOLATE SERPL-MCNC: 23.8 NG/ML
GFR SERPLBLD BASED ON 1.73 SQ M-ARVRAT: 88 ML/MIN/1.73M2
GLUCOSE SERPL-MCNC: 138 MG/DL (ref 65–99)
POTASSIUM SERPL-SCNC: 3.4 MMOL/L (ref 3.4–5.1)
PROT SERPL-MCNC: 6.6 G/DL (ref 6.4–8.2)
RAINBOW EXTRA TUBES HOLD SPECIMEN: NORMAL
SODIUM SERPL-SCNC: 141 MMOL/L (ref 135–145)
VIT B12 SERPL-MCNC: 380 PG/ML (ref 211–911)

## 2021-07-27 PROCEDURE — 10004651 HB RX, NO CHARGE ITEM: Performed by: INTERNAL MEDICINE

## 2021-07-27 PROCEDURE — 10000002 HB ROOM CHARGE MED SURG

## 2021-07-27 PROCEDURE — 99233 SBSQ HOSP IP/OBS HIGH 50: CPT | Performed by: FAMILY MEDICINE

## 2021-07-27 PROCEDURE — 36415 COLL VENOUS BLD VENIPUNCTURE: CPT | Performed by: FAMILY MEDICINE

## 2021-07-27 PROCEDURE — 99232 SBSQ HOSP IP/OBS MODERATE 35: CPT | Performed by: PSYCHIATRY & NEUROLOGY

## 2021-07-27 PROCEDURE — 10002803 HB RX 637: Performed by: PSYCHIATRY & NEUROLOGY

## 2021-07-27 PROCEDURE — 80076 HEPATIC FUNCTION PANEL: CPT | Performed by: FAMILY MEDICINE

## 2021-07-27 PROCEDURE — 10002800 HB RX 250 W HCPCS: Performed by: INTERNAL MEDICINE

## 2021-07-27 PROCEDURE — 97530 THERAPEUTIC ACTIVITIES: CPT

## 2021-07-27 PROCEDURE — 10002803 HB RX 637: Performed by: FAMILY MEDICINE

## 2021-07-27 PROCEDURE — 97116 GAIT TRAINING THERAPY: CPT

## 2021-07-27 PROCEDURE — 10002803 HB RX 637: Performed by: INTERNAL MEDICINE

## 2021-07-27 PROCEDURE — 80048 BASIC METABOLIC PNL TOTAL CA: CPT | Performed by: FAMILY MEDICINE

## 2021-07-27 PROCEDURE — 10002800 HB RX 250 W HCPCS: Performed by: EMERGENCY MEDICINE

## 2021-07-27 PROCEDURE — 82607 VITAMIN B-12: CPT | Performed by: FAMILY MEDICINE

## 2021-07-27 RX ADMIN — CLONAZEPAM 0.5 MG: 0.5 TABLET ORAL at 21:02

## 2021-07-27 RX ADMIN — LORAZEPAM 2 MG: 2 INJECTION INTRAMUSCULAR; INTRAVENOUS at 06:04

## 2021-07-27 RX ADMIN — ENOXAPARIN SODIUM 40 MG: 100 INJECTION SUBCUTANEOUS at 09:02

## 2021-07-27 RX ADMIN — CLONAZEPAM 0.5 MG: 0.5 TABLET ORAL at 14:42

## 2021-07-27 RX ADMIN — GABAPENTIN 400 MG: 100 CAPSULE ORAL at 06:04

## 2021-07-27 RX ADMIN — GABAPENTIN 400 MG: 100 CAPSULE ORAL at 14:42

## 2021-07-27 RX ADMIN — LORAZEPAM 2 MG: 2 INJECTION INTRAMUSCULAR; INTRAVENOUS at 16:48

## 2021-07-27 RX ADMIN — Medication 25 MCG: at 09:02

## 2021-07-27 RX ADMIN — SODIUM CHLORIDE, PRESERVATIVE FREE 2 ML: 5 INJECTION INTRAVENOUS at 21:02

## 2021-07-27 RX ADMIN — GABAPENTIN 400 MG: 100 CAPSULE ORAL at 21:02

## 2021-07-27 RX ADMIN — LORAZEPAM 2 MG: 2 INJECTION INTRAMUSCULAR; INTRAVENOUS at 01:35

## 2021-07-27 RX ADMIN — SODIUM CHLORIDE, PRESERVATIVE FREE 2 ML: 5 INJECTION INTRAVENOUS at 09:03

## 2021-07-27 RX ADMIN — LORAZEPAM 2 MG: 2 INJECTION INTRAMUSCULAR; INTRAVENOUS at 21:16

## 2021-07-27 RX ADMIN — ATORVASTATIN CALCIUM 20 MG: 20 TABLET, FILM COATED ORAL at 09:03

## 2021-07-27 RX ADMIN — CLONAZEPAM 0.5 MG: 0.5 TABLET ORAL at 09:03

## 2021-07-27 RX ADMIN — AMLODIPINE BESYLATE 5 MG: 5 TABLET ORAL at 09:03

## 2021-07-27 RX ADMIN — LORAZEPAM 2 MG: 2 INJECTION INTRAMUSCULAR; INTRAVENOUS at 09:02

## 2021-07-27 RX ADMIN — LORAZEPAM 2 MG: 2 INJECTION INTRAMUSCULAR; INTRAVENOUS at 11:33

## 2021-07-27 RX ADMIN — LORAZEPAM 2 MG: 2 INJECTION INTRAMUSCULAR; INTRAVENOUS at 02:59

## 2021-07-27 ASSESSMENT — LIFESTYLE VARIABLES
NAUSEA AND VOMITING: NO NAUSEA AND NO VOMITING
VISUAL DISTURBANCES: NOT PRESENT
NAUSEA AND VOMITING: NO NAUSEA AND NO VOMITING
PAROXYSMAL SWEATS: 2
ANXIETY: 3
AGITATION: SOMEWHAT MORE THAN NORMAL ACTIVITY
HEADACHE, FULLNESS IN HEAD: NOT PRESENT
VISUAL DISTURBANCES: MILD SENSITIVITY
PAROXYSMAL SWEATS: 2
ANXIETY: 2
TREMOR: 3
AGITATION: SOMEWHAT MORE THAN NORMAL ACTIVITY
ANXIETY: NO ANXIETY, AT EASE
TREMOR: 3
AGITATION: NORMAL ACTIVITY
HEADACHE, FULLNESS IN HEAD: NOT PRESENT
TREMOR: MODERATE, WITH PATIENT'S ARMS EXTENDED
AUDITORY DISTURBANCES: NOT PRESENT
PAROXYSMAL SWEATS: NO SWEAT VISIBLE
HEADACHE, FULLNESS IN HEAD: NOT PRESENT
AUDITORY DISTURBANCES: NOT PRESENT
NAUSEA AND VOMITING: NO NAUSEA AND NO VOMITING
AUDITORY DISTURBANCES: NOT PRESENT
VISUAL DISTURBANCES: NOT PRESENT

## 2021-07-27 ASSESSMENT — ENCOUNTER SYMPTOMS: PAIN SEVERITY NOW: 6

## 2021-07-27 ASSESSMENT — PAIN SCALES - GENERAL
PAINLEVEL_OUTOF10: 1
PAINLEVEL_OUTOF10: 7

## 2021-07-27 ASSESSMENT — COGNITIVE AND FUNCTIONAL STATUS - GENERAL
BASIC_MOBILITY_RAW_SCORE: 17
BASIC_MOBILITY_CONVERTED_SCORE: 39.67

## 2021-07-28 ENCOUNTER — APPOINTMENT (OUTPATIENT)
Dept: ULTRASOUND IMAGING | Age: 71
DRG: 897 | End: 2021-07-28
Attending: FAMILY MEDICINE

## 2021-07-28 PROBLEM — F41.1 GAD (GENERALIZED ANXIETY DISORDER): Chronic | Status: ACTIVE | Noted: 2021-07-28

## 2021-07-28 PROBLEM — R74.8 ELEVATED LIVER ENZYMES: Status: ACTIVE | Noted: 2021-07-28

## 2021-07-28 PROBLEM — R26.9 GAIT DIFFICULTY: Status: ACTIVE | Noted: 2021-07-28

## 2021-07-28 PROBLEM — F32.9 MAJOR DEPRESSION: Chronic | Status: ACTIVE | Noted: 2021-07-28

## 2021-07-28 PROBLEM — S06.9XAA TRAUMATIC BRAIN INJURY (CMD): Status: ACTIVE | Noted: 2021-07-28

## 2021-07-28 PROBLEM — R53.81 DEBILITY: Status: ACTIVE | Noted: 2021-07-28

## 2021-07-28 PROBLEM — R29.6 FALLS FREQUENTLY: Status: ACTIVE | Noted: 2021-07-28

## 2021-07-28 PROBLEM — I10 HTN (HYPERTENSION): Status: ACTIVE | Noted: 2021-07-28

## 2021-07-28 LAB
ALBUMIN SERPL-MCNC: 3.4 G/DL (ref 3.6–5.1)
ALP SERPL-CCNC: 104 UNITS/L (ref 45–117)
ALT SERPL-CCNC: 156 UNITS/L
ANION GAP SERPL CALC-SCNC: 9 MMOL/L (ref 10–20)
AST SERPL-CCNC: 87 UNITS/L
BILIRUB CONJ SERPL-MCNC: 0.2 MG/DL (ref 0–0.2)
BILIRUB SERPL-MCNC: 1.4 MG/DL (ref 0.2–1)
BUN SERPL-MCNC: 13 MG/DL (ref 6–20)
BUN/CREAT SERPL: 16 (ref 7–25)
CALCIUM SERPL-MCNC: 9.9 MG/DL (ref 8.4–10.2)
CHLORIDE SERPL-SCNC: 109 MMOL/L (ref 98–107)
CO2 SERPL-SCNC: 27 MMOL/L (ref 21–32)
CREAT SERPL-MCNC: 0.8 MG/DL (ref 0.67–1.17)
FASTING DURATION TIME PATIENT: ABNORMAL H
GFR SERPLBLD BASED ON 1.73 SQ M-ARVRAT: >90 ML/MIN/1.73M2
GLUCOSE SERPL-MCNC: 97 MG/DL (ref 65–99)
MAGNESIUM SERPL-MCNC: 2 MG/DL (ref 1.7–2.4)
POTASSIUM SERPL-SCNC: 4.2 MMOL/L (ref 3.4–5.1)
PROT SERPL-MCNC: 6.5 G/DL (ref 6.4–8.2)
SODIUM SERPL-SCNC: 141 MMOL/L (ref 135–145)

## 2021-07-28 PROCEDURE — 10002800 HB RX 250 W HCPCS: Performed by: INTERNAL MEDICINE

## 2021-07-28 PROCEDURE — 97110 THERAPEUTIC EXERCISES: CPT

## 2021-07-28 PROCEDURE — 10002803 HB RX 637: Performed by: FAMILY MEDICINE

## 2021-07-28 PROCEDURE — 10002803 HB RX 637: Performed by: INTERNAL MEDICINE

## 2021-07-28 PROCEDURE — 10002807 HB RX 258: Performed by: FAMILY MEDICINE

## 2021-07-28 PROCEDURE — 97116 GAIT TRAINING THERAPY: CPT

## 2021-07-28 PROCEDURE — 76705 ECHO EXAM OF ABDOMEN: CPT

## 2021-07-28 PROCEDURE — 36415 COLL VENOUS BLD VENIPUNCTURE: CPT | Performed by: FAMILY MEDICINE

## 2021-07-28 PROCEDURE — 10002803 HB RX 637: Performed by: PSYCHIATRY & NEUROLOGY

## 2021-07-28 PROCEDURE — 10004651 HB RX, NO CHARGE ITEM: Performed by: INTERNAL MEDICINE

## 2021-07-28 PROCEDURE — 10002800 HB RX 250 W HCPCS: Performed by: EMERGENCY MEDICINE

## 2021-07-28 PROCEDURE — 10000002 HB ROOM CHARGE MED SURG

## 2021-07-28 PROCEDURE — 99233 SBSQ HOSP IP/OBS HIGH 50: CPT | Performed by: FAMILY MEDICINE

## 2021-07-28 PROCEDURE — 99232 SBSQ HOSP IP/OBS MODERATE 35: CPT | Performed by: PSYCHIATRY & NEUROLOGY

## 2021-07-28 PROCEDURE — 80076 HEPATIC FUNCTION PANEL: CPT | Performed by: FAMILY MEDICINE

## 2021-07-28 PROCEDURE — 80048 BASIC METABOLIC PNL TOTAL CA: CPT | Performed by: FAMILY MEDICINE

## 2021-07-28 PROCEDURE — 83735 ASSAY OF MAGNESIUM: CPT | Performed by: FAMILY MEDICINE

## 2021-07-28 RX ADMIN — CLONAZEPAM 0.5 MG: 0.5 TABLET ORAL at 21:41

## 2021-07-28 RX ADMIN — Medication 25 MCG: at 09:34

## 2021-07-28 RX ADMIN — CLONAZEPAM 0.5 MG: 0.5 TABLET ORAL at 15:18

## 2021-07-28 RX ADMIN — GABAPENTIN 400 MG: 100 CAPSULE ORAL at 06:13

## 2021-07-28 RX ADMIN — LORAZEPAM 2 MG: 2 INJECTION INTRAMUSCULAR; INTRAVENOUS at 12:41

## 2021-07-28 RX ADMIN — CLONAZEPAM 0.5 MG: 0.5 TABLET ORAL at 09:34

## 2021-07-28 RX ADMIN — SODIUM CHLORIDE 500 ML: 0.9 INJECTION, SOLUTION INTRAVENOUS at 15:23

## 2021-07-28 RX ADMIN — GABAPENTIN 400 MG: 100 CAPSULE ORAL at 15:18

## 2021-07-28 RX ADMIN — SODIUM CHLORIDE, PRESERVATIVE FREE 2 ML: 5 INJECTION INTRAVENOUS at 21:42

## 2021-07-28 RX ADMIN — AMLODIPINE BESYLATE 5 MG: 5 TABLET ORAL at 09:34

## 2021-07-28 RX ADMIN — ENOXAPARIN SODIUM 40 MG: 100 INJECTION SUBCUTANEOUS at 09:34

## 2021-07-28 RX ADMIN — ATORVASTATIN CALCIUM 20 MG: 20 TABLET, FILM COATED ORAL at 09:34

## 2021-07-28 RX ADMIN — GABAPENTIN 400 MG: 100 CAPSULE ORAL at 21:41

## 2021-07-28 ASSESSMENT — LIFESTYLE VARIABLES
HEADACHE, FULLNESS IN HEAD: MILD
VISUAL DISTURBANCES: NOT PRESENT
AGITATION: NORMAL ACTIVITY
ANXIETY: MILDLY ANXIOUS
AUDITORY DISTURBANCES: NOT PRESENT
AUDITORY DISTURBANCES: NOT PRESENT
ANXIETY: MILDLY ANXIOUS
NAUSEA AND VOMITING: NO NAUSEA AND NO VOMITING
ANXIETY: MILDLY ANXIOUS
AGITATION: NORMAL ACTIVITY
TREMOR: 2
AUDITORY DISTURBANCES: NOT PRESENT
VISUAL DISTURBANCES: NOT PRESENT
AGITATION: NORMAL ACTIVITY
TREMOR: 3
ANXIETY: 2
TREMOR: 2
HEADACHE, FULLNESS IN HEAD: MILD
HEADACHE, FULLNESS IN HEAD: VERY MILD
NAUSEA AND VOMITING: NO NAUSEA AND NO VOMITING
PAROXYSMAL SWEATS: BARELY PERCEPTIBLE SWEATING, PALMS MOIST
NAUSEA AND VOMITING: NO NAUSEA AND NO VOMITING
TREMOR: 2
AUDITORY DISTURBANCES: NOT PRESENT
NAUSEA AND VOMITING: NO NAUSEA AND NO VOMITING
PAROXYSMAL SWEATS: BARELY PERCEPTIBLE SWEATING, PALMS MOIST
HEADACHE, FULLNESS IN HEAD: VERY MILD
PAROXYSMAL SWEATS: NO SWEAT VISIBLE
AUDITORY DISTURBANCES: NOT PRESENT
TREMOR: 2
HEADACHE, FULLNESS IN HEAD: MILD
PAROXYSMAL SWEATS: BARELY PERCEPTIBLE SWEATING, PALMS MOIST
TREMOR: 2
VISUAL DISTURBANCES: NOT PRESENT
VISUAL DISTURBANCES: NOT PRESENT
HEADACHE, FULLNESS IN HEAD: MILD
ANXIETY: MILDLY ANXIOUS
AUDITORY DISTURBANCES: NOT PRESENT
VISUAL DISTURBANCES: NOT PRESENT
NAUSEA AND VOMITING: NO NAUSEA AND NO VOMITING
AGITATION: NORMAL ACTIVITY
ANXIETY: MILDLY ANXIOUS
AGITATION: NORMAL ACTIVITY
PAROXYSMAL SWEATS: BARELY PERCEPTIBLE SWEATING, PALMS MOIST
ANXIETY: MILDLY ANXIOUS
NAUSEA AND VOMITING: NO NAUSEA AND NO VOMITING
VISUAL DISTURBANCES: NOT PRESENT
TREMOR: 2
PAROXYSMAL SWEATS: BARELY PERCEPTIBLE SWEATING, PALMS MOIST
AGITATION: NORMAL ACTIVITY
PAROXYSMAL SWEATS: NO SWEAT VISIBLE
VISUAL DISTURBANCES: NOT PRESENT
TREMOR: 2
VISUAL DISTURBANCES: NOT PRESENT
AGITATION: NORMAL ACTIVITY
AGITATION: NORMAL ACTIVITY
HEADACHE, FULLNESS IN HEAD: NOT PRESENT
AUDITORY DISTURBANCES: NOT PRESENT
HEADACHE, FULLNESS IN HEAD: NOT PRESENT
AUDITORY DISTURBANCES: NOT PRESENT
PAROXYSMAL SWEATS: BARELY PERCEPTIBLE SWEATING, PALMS MOIST
ANXIETY: 2

## 2021-07-28 ASSESSMENT — PAIN SCALES - GENERAL
PAINLEVEL_OUTOF10: 0
PAINLEVEL_OUTOF10: 0

## 2021-07-28 ASSESSMENT — ENCOUNTER SYMPTOMS: PAIN SEVERITY NOW: 9

## 2021-07-28 ASSESSMENT — COGNITIVE AND FUNCTIONAL STATUS - GENERAL
BASIC_MOBILITY_RAW_SCORE: 19
BASIC_MOBILITY_CONVERTED_SCORE: 42.48

## 2021-07-29 PROBLEM — H93.19 TINNITUS: Status: ACTIVE | Noted: 2021-07-29

## 2021-07-29 LAB
ALBUMIN SERPL-MCNC: 3.2 G/DL (ref 3.6–5.1)
ALP SERPL-CCNC: 108 UNITS/L (ref 45–117)
ALT SERPL-CCNC: 127 UNITS/L
ANION GAP SERPL CALC-SCNC: 8 MMOL/L (ref 10–20)
AST SERPL-CCNC: 50 UNITS/L
BILIRUB CONJ SERPL-MCNC: 0.2 MG/DL (ref 0–0.2)
BILIRUB SERPL-MCNC: 1.1 MG/DL (ref 0.2–1)
BUN SERPL-MCNC: 11 MG/DL (ref 6–20)
BUN/CREAT SERPL: 14 (ref 7–25)
CALCIUM SERPL-MCNC: 9.6 MG/DL (ref 8.4–10.2)
CHLORIDE SERPL-SCNC: 111 MMOL/L (ref 98–107)
CO2 SERPL-SCNC: 26 MMOL/L (ref 21–32)
CREAT SERPL-MCNC: 0.77 MG/DL (ref 0.67–1.17)
DEPRECATED RDW RBC: 53.5 FL (ref 39–50)
ERYTHROCYTE [DISTWIDTH] IN BLOOD: 13.8 % (ref 11–15)
FASTING DURATION TIME PATIENT: ABNORMAL H
GFR SERPLBLD BASED ON 1.73 SQ M-ARVRAT: >90 ML/MIN/1.73M2
GLUCOSE SERPL-MCNC: 104 MG/DL (ref 65–99)
HCT VFR BLD CALC: 51 % (ref 39–51)
HGB BLD-MCNC: 16.3 G/DL (ref 13–17)
INR PPP: 1
MCH RBC QN AUTO: 33.2 PG (ref 26–34)
MCHC RBC AUTO-ENTMCNC: 32 G/DL (ref 32–36.5)
MCV RBC AUTO: 103.9 FL (ref 78–100)
NRBC BLD MANUAL-RTO: 0 /100 WBC
PLATELET # BLD AUTO: 157 K/MCL (ref 140–450)
POTASSIUM SERPL-SCNC: 3.3 MMOL/L (ref 3.4–5.1)
PROT SERPL-MCNC: 6.5 G/DL (ref 6.4–8.2)
PROTHROMBIN TIME: 10.7 SEC (ref 9.7–11.8)
RBC # BLD: 4.91 MIL/MCL (ref 4.5–5.9)
SODIUM SERPL-SCNC: 142 MMOL/L (ref 135–145)
WBC # BLD: 5.9 K/MCL (ref 4.2–11)

## 2021-07-29 PROCEDURE — 80048 BASIC METABOLIC PNL TOTAL CA: CPT | Performed by: FAMILY MEDICINE

## 2021-07-29 PROCEDURE — 10002800 HB RX 250 W HCPCS: Performed by: INTERNAL MEDICINE

## 2021-07-29 PROCEDURE — 10002803 HB RX 637: Performed by: INTERNAL MEDICINE

## 2021-07-29 PROCEDURE — 10002803 HB RX 637: Performed by: PSYCHIATRY & NEUROLOGY

## 2021-07-29 PROCEDURE — 10002800 HB RX 250 W HCPCS: Performed by: EMERGENCY MEDICINE

## 2021-07-29 PROCEDURE — 80076 HEPATIC FUNCTION PANEL: CPT | Performed by: FAMILY MEDICINE

## 2021-07-29 PROCEDURE — 85027 COMPLETE CBC AUTOMATED: CPT | Performed by: FAMILY MEDICINE

## 2021-07-29 PROCEDURE — 10004651 HB RX, NO CHARGE ITEM: Performed by: INTERNAL MEDICINE

## 2021-07-29 PROCEDURE — 99233 SBSQ HOSP IP/OBS HIGH 50: CPT | Performed by: FAMILY MEDICINE

## 2021-07-29 PROCEDURE — 99222 1ST HOSP IP/OBS MODERATE 55: CPT | Performed by: SPECIALIST

## 2021-07-29 PROCEDURE — 85610 PROTHROMBIN TIME: CPT | Performed by: FAMILY MEDICINE

## 2021-07-29 PROCEDURE — 99232 SBSQ HOSP IP/OBS MODERATE 35: CPT | Performed by: PSYCHIATRY & NEUROLOGY

## 2021-07-29 PROCEDURE — 36415 COLL VENOUS BLD VENIPUNCTURE: CPT | Performed by: FAMILY MEDICINE

## 2021-07-29 PROCEDURE — 10002803 HB RX 637: Performed by: FAMILY MEDICINE

## 2021-07-29 PROCEDURE — 10000002 HB ROOM CHARGE MED SURG

## 2021-07-29 PROCEDURE — 10002800 HB RX 250 W HCPCS: Performed by: NURSE PRACTITIONER

## 2021-07-29 RX ORDER — LORAZEPAM 1 MG/1
1 TABLET ORAL
Status: DISCONTINUED | OUTPATIENT
Start: 2021-07-29 | End: 2021-07-30

## 2021-07-29 RX ORDER — PAROXETINE HYDROCHLORIDE 20 MG/1
20 TABLET, FILM COATED ORAL NIGHTLY
Status: DISCONTINUED | OUTPATIENT
Start: 2021-07-29 | End: 2021-08-02

## 2021-07-29 RX ORDER — CLONAZEPAM 0.5 MG/1
0.5 TABLET ORAL 2 TIMES DAILY
Status: DISCONTINUED | OUTPATIENT
Start: 2021-07-29 | End: 2021-08-03 | Stop reason: HOSPADM

## 2021-07-29 RX ADMIN — SODIUM CHLORIDE, PRESERVATIVE FREE 2 ML: 5 INJECTION INTRAVENOUS at 08:37

## 2021-07-29 RX ADMIN — LORAZEPAM 2 MG: 2 INJECTION INTRAMUSCULAR; INTRAVENOUS at 04:42

## 2021-07-29 RX ADMIN — HYDRALAZINE HYDROCHLORIDE 10 MG: 20 INJECTION, SOLUTION INTRAMUSCULAR; INTRAVENOUS at 04:40

## 2021-07-29 RX ADMIN — LORAZEPAM 1 MG: 1 TABLET ORAL at 16:54

## 2021-07-29 RX ADMIN — Medication 25 MCG: at 08:36

## 2021-07-29 RX ADMIN — PAROXETINE 20 MG: 20 TABLET, FILM COATED ORAL at 21:02

## 2021-07-29 RX ADMIN — LORAZEPAM 2 MG: 2 INJECTION INTRAMUSCULAR; INTRAVENOUS at 08:36

## 2021-07-29 RX ADMIN — CLONAZEPAM 0.5 MG: 0.5 TABLET ORAL at 12:28

## 2021-07-29 RX ADMIN — ATORVASTATIN CALCIUM 20 MG: 20 TABLET, FILM COATED ORAL at 08:36

## 2021-07-29 RX ADMIN — GABAPENTIN 400 MG: 100 CAPSULE ORAL at 05:46

## 2021-07-29 RX ADMIN — GABAPENTIN 400 MG: 100 CAPSULE ORAL at 21:02

## 2021-07-29 RX ADMIN — AMLODIPINE BESYLATE 5 MG: 5 TABLET ORAL at 08:36

## 2021-07-29 RX ADMIN — SODIUM CHLORIDE, PRESERVATIVE FREE 2 ML: 5 INJECTION INTRAVENOUS at 21:03

## 2021-07-29 RX ADMIN — ENOXAPARIN SODIUM 40 MG: 100 INJECTION SUBCUTANEOUS at 08:36

## 2021-07-29 RX ADMIN — LORAZEPAM 1 MG: 1 TABLET ORAL at 21:02

## 2021-07-29 RX ADMIN — CLONAZEPAM 0.5 MG: 0.5 TABLET ORAL at 21:03

## 2021-07-29 ASSESSMENT — LIFESTYLE VARIABLES
ANXIETY: MILDLY ANXIOUS
HEADACHE, FULLNESS IN HEAD: NOT PRESENT
AGITATION: NORMAL ACTIVITY
TREMOR: 2
AUDITORY DISTURBANCES: NOT PRESENT
HEADACHE, FULLNESS IN HEAD: NOT PRESENT
VISUAL DISTURBANCES: NOT PRESENT
NAUSEA AND VOMITING: NO NAUSEA AND NO VOMITING
VISUAL DISTURBANCES: NOT PRESENT
AUDITORY DISTURBANCES: NOT PRESENT
AGITATION: NORMAL ACTIVITY
VISUAL DISTURBANCES: NOT PRESENT
ANXIETY: 3
ANXIETY: 2
HEADACHE, FULLNESS IN HEAD: NOT PRESENT
PAROXYSMAL SWEATS: NO SWEAT VISIBLE
AUDITORY DISTURBANCES: NOT PRESENT
AGITATION: NORMAL ACTIVITY
TREMOR: 2
SUBSTANCE_ABUSE: 1
ANXIETY: 2
NAUSEA AND VOMITING: NO NAUSEA AND NO VOMITING
NAUSEA AND VOMITING: NO NAUSEA AND NO VOMITING
PAROXYSMAL SWEATS: BARELY PERCEPTIBLE SWEATING, PALMS MOIST
TREMOR: 2
NAUSEA AND VOMITING: NO NAUSEA AND NO VOMITING
AUDITORY DISTURBANCES: NOT PRESENT
HEADACHE, FULLNESS IN HEAD: NOT PRESENT
HEADACHE, FULLNESS IN HEAD: NOT PRESENT
NAUSEA AND VOMITING: NO NAUSEA AND NO VOMITING
PAROXYSMAL SWEATS: BARELY PERCEPTIBLE SWEATING, PALMS MOIST
AGITATION: NORMAL ACTIVITY
AUDITORY DISTURBANCES: NOT PRESENT
AGITATION: NORMAL ACTIVITY
PAROXYSMAL SWEATS: NO SWEAT VISIBLE
TREMOR: 2
PAROXYSMAL SWEATS: BARELY PERCEPTIBLE SWEATING, PALMS MOIST
VISUAL DISTURBANCES: NOT PRESENT
TREMOR: 2
ANXIETY: 3
VISUAL DISTURBANCES: NOT PRESENT

## 2021-07-29 ASSESSMENT — ENCOUNTER SYMPTOMS
CONSTIPATION: 0
WEAKNESS: 1
DIZZINESS: 1
SPEECH CHANGE: 0
CHILLS: 0
VOMITING: 0
NAUSEA: 0
FEVER: 0
HEADACHES: 1
DIARRHEA: 0
SENSORY CHANGE: 1
COUGH: 0

## 2021-07-29 ASSESSMENT — PAIN SCALES - GENERAL
PAINLEVEL_OUTOF10: 0
PAINLEVEL_OUTOF10: 0

## 2021-07-30 PROCEDURE — 10002803 HB RX 637: Performed by: PSYCHIATRY & NEUROLOGY

## 2021-07-30 PROCEDURE — 99232 SBSQ HOSP IP/OBS MODERATE 35: CPT | Performed by: PSYCHIATRY & NEUROLOGY

## 2021-07-30 PROCEDURE — 10002803 HB RX 637: Performed by: INTERNAL MEDICINE

## 2021-07-30 PROCEDURE — 10000002 HB ROOM CHARGE MED SURG

## 2021-07-30 PROCEDURE — 10002800 HB RX 250 W HCPCS: Performed by: INTERNAL MEDICINE

## 2021-07-30 PROCEDURE — 10004651 HB RX, NO CHARGE ITEM: Performed by: INTERNAL MEDICINE

## 2021-07-30 PROCEDURE — 10002800 HB RX 250 W HCPCS: Performed by: NURSE PRACTITIONER

## 2021-07-30 PROCEDURE — 10002803 HB RX 637: Performed by: FAMILY MEDICINE

## 2021-07-30 PROCEDURE — 99233 SBSQ HOSP IP/OBS HIGH 50: CPT | Performed by: FAMILY MEDICINE

## 2021-07-30 PROCEDURE — 97116 GAIT TRAINING THERAPY: CPT

## 2021-07-30 RX ORDER — CLONAZEPAM 0.5 MG/1
0.5 TABLET ORAL 2 TIMES DAILY
Qty: 24 TABLET | Refills: 0 | Status: SHIPPED | OUTPATIENT
Start: 2021-07-30

## 2021-07-30 RX ORDER — LORAZEPAM 2 MG/1
2 TABLET ORAL
Status: DISCONTINUED | OUTPATIENT
Start: 2021-07-30 | End: 2021-08-03 | Stop reason: HOSPADM

## 2021-07-30 RX ORDER — PAROXETINE HYDROCHLORIDE 20 MG/1
20 TABLET, FILM COATED ORAL NIGHTLY
Qty: 30 TABLET | Refills: 0 | Status: SHIPPED | OUTPATIENT
Start: 2021-07-30 | End: 2021-08-02 | Stop reason: HOSPADM

## 2021-07-30 RX ORDER — AMLODIPINE BESYLATE 5 MG/1
5 TABLET ORAL DAILY
Qty: 30 TABLET | Refills: 0 | Status: SHIPPED | OUTPATIENT
Start: 2021-07-31

## 2021-07-30 RX ORDER — GABAPENTIN 400 MG/1
400 CAPSULE ORAL EVERY 8 HOURS SCHEDULED
Qty: 90 CAPSULE | Refills: 0 | Status: SHIPPED | OUTPATIENT
Start: 2021-07-30

## 2021-07-30 RX ORDER — POTASSIUM CHLORIDE 20 MEQ/1
40 TABLET, EXTENDED RELEASE ORAL ONCE
Status: COMPLETED | OUTPATIENT
Start: 2021-07-30 | End: 2021-07-30

## 2021-07-30 RX ADMIN — CLONAZEPAM 0.5 MG: 0.5 TABLET ORAL at 21:29

## 2021-07-30 RX ADMIN — POTASSIUM CHLORIDE 40 MEQ: 1500 TABLET, EXTENDED RELEASE ORAL at 11:24

## 2021-07-30 RX ADMIN — SODIUM CHLORIDE, PRESERVATIVE FREE 2 ML: 5 INJECTION INTRAVENOUS at 21:29

## 2021-07-30 RX ADMIN — CLONAZEPAM 0.5 MG: 0.5 TABLET ORAL at 08:27

## 2021-07-30 RX ADMIN — HYDRALAZINE HYDROCHLORIDE 10 MG: 20 INJECTION, SOLUTION INTRAMUSCULAR; INTRAVENOUS at 08:30

## 2021-07-30 RX ADMIN — LORAZEPAM 1 MG: 1 TABLET ORAL at 08:30

## 2021-07-30 RX ADMIN — AMLODIPINE BESYLATE 5 MG: 5 TABLET ORAL at 08:26

## 2021-07-30 RX ADMIN — GABAPENTIN 400 MG: 100 CAPSULE ORAL at 06:02

## 2021-07-30 RX ADMIN — PAROXETINE 20 MG: 20 TABLET, FILM COATED ORAL at 21:29

## 2021-07-30 RX ADMIN — ENOXAPARIN SODIUM 40 MG: 100 INJECTION SUBCUTANEOUS at 08:30

## 2021-07-30 RX ADMIN — ATORVASTATIN CALCIUM 20 MG: 20 TABLET, FILM COATED ORAL at 08:26

## 2021-07-30 RX ADMIN — LORAZEPAM 2 MG: 2 TABLET ORAL at 18:56

## 2021-07-30 RX ADMIN — LORAZEPAM 2 MG: 2 TABLET ORAL at 11:24

## 2021-07-30 RX ADMIN — SODIUM CHLORIDE, PRESERVATIVE FREE 2 ML: 5 INJECTION INTRAVENOUS at 08:30

## 2021-07-30 ASSESSMENT — LIFESTYLE VARIABLES
NAUSEA AND VOMITING: NO NAUSEA AND NO VOMITING
VISUAL DISTURBANCES: NOT PRESENT
ANXIETY: 2
ANXIETY: 2
TREMOR: NOT VISIBLE, BUT CAN BE FELT FINGERTIP TO FINGERTIP
AGITATION: NORMAL ACTIVITY
PAROXYSMAL SWEATS: BARELY PERCEPTIBLE SWEATING, PALMS MOIST
ANXIETY: 2
NAUSEA AND VOMITING: NO NAUSEA AND NO VOMITING
AUDITORY DISTURBANCES: NOT PRESENT
HEADACHE, FULLNESS IN HEAD: NOT PRESENT
PAROXYSMAL SWEATS: 2
AUDITORY DISTURBANCES: NOT PRESENT
TREMOR: 2
NAUSEA AND VOMITING: NO NAUSEA AND NO VOMITING
TREMOR: 2
TREMOR: 2
HEADACHE, FULLNESS IN HEAD: VERY MILD
VISUAL DISTURBANCES: NOT PRESENT
NAUSEA AND VOMITING: NO NAUSEA AND NO VOMITING
HEADACHE, FULLNESS IN HEAD: NOT PRESENT
HEADACHE, FULLNESS IN HEAD: VERY MILD
HEADACHE, FULLNESS IN HEAD: NOT PRESENT
AUDITORY DISTURBANCES: NOT PRESENT
AGITATION: NORMAL ACTIVITY
NAUSEA AND VOMITING: NO NAUSEA AND NO VOMITING
ANXIETY: 2
AGITATION: NORMAL ACTIVITY
TREMOR: NOT VISIBLE, BUT CAN BE FELT FINGERTIP TO FINGERTIP
AUDITORY DISTURBANCES: NOT PRESENT
PAROXYSMAL SWEATS: BARELY PERCEPTIBLE SWEATING, PALMS MOIST
AGITATION: NORMAL ACTIVITY
ANXIETY: 2
AUDITORY DISTURBANCES: NOT PRESENT
VISUAL DISTURBANCES: NOT PRESENT
VISUAL DISTURBANCES: NOT PRESENT
AGITATION: NORMAL ACTIVITY
VISUAL DISTURBANCES: NOT PRESENT
PAROXYSMAL SWEATS: BARELY PERCEPTIBLE SWEATING, PALMS MOIST
PAROXYSMAL SWEATS: 2

## 2021-07-30 ASSESSMENT — ENCOUNTER SYMPTOMS: PAIN SEVERITY NOW: 5

## 2021-07-30 ASSESSMENT — PAIN SCALES - GENERAL
PAINLEVEL_OUTOF10: 0
PAINLEVEL_OUTOF10: 0

## 2021-07-30 ASSESSMENT — COGNITIVE AND FUNCTIONAL STATUS - GENERAL
BASIC_MOBILITY_RAW_SCORE: 20
BASIC_MOBILITY_CONVERTED_SCORE: 43.99

## 2021-07-31 LAB
ANION GAP SERPL CALC-SCNC: 6 MMOL/L (ref 10–20)
BUN SERPL-MCNC: 13 MG/DL (ref 6–20)
BUN/CREAT SERPL: 15 (ref 7–25)
CALCIUM SERPL-MCNC: 9.7 MG/DL (ref 8.4–10.2)
CHLORIDE SERPL-SCNC: 113 MMOL/L (ref 98–107)
CO2 SERPL-SCNC: 28 MMOL/L (ref 21–32)
CREAT SERPL-MCNC: 0.89 MG/DL (ref 0.67–1.17)
DEPRECATED RDW RBC: 52.6 FL (ref 39–50)
ERYTHROCYTE [DISTWIDTH] IN BLOOD: 13.6 % (ref 11–15)
FASTING DURATION TIME PATIENT: ABNORMAL H
GFR SERPLBLD BASED ON 1.73 SQ M-ARVRAT: 87 ML/MIN/1.73M2
GLUCOSE SERPL-MCNC: 110 MG/DL (ref 65–99)
HCT VFR BLD CALC: 48.1 % (ref 39–51)
HGB BLD-MCNC: 15.3 G/DL (ref 13–17)
MCH RBC QN AUTO: 33.2 PG (ref 26–34)
MCHC RBC AUTO-ENTMCNC: 31.8 G/DL (ref 32–36.5)
MCV RBC AUTO: 104.3 FL (ref 78–100)
NRBC BLD MANUAL-RTO: 0 /100 WBC
PLATELET # BLD AUTO: 188 K/MCL (ref 140–450)
POTASSIUM SERPL-SCNC: 4.1 MMOL/L (ref 3.4–5.1)
RBC # BLD: 4.61 MIL/MCL (ref 4.5–5.9)
SODIUM SERPL-SCNC: 143 MMOL/L (ref 135–145)
WBC # BLD: 6.6 K/MCL (ref 4.2–11)

## 2021-07-31 PROCEDURE — 10002803 HB RX 637: Performed by: PSYCHIATRY & NEUROLOGY

## 2021-07-31 PROCEDURE — 99233 SBSQ HOSP IP/OBS HIGH 50: CPT | Performed by: INTERNAL MEDICINE

## 2021-07-31 PROCEDURE — 10002803 HB RX 637: Performed by: INTERNAL MEDICINE

## 2021-07-31 PROCEDURE — 10000002 HB ROOM CHARGE MED SURG

## 2021-07-31 PROCEDURE — 10002803 HB RX 637: Performed by: NURSE PRACTITIONER

## 2021-07-31 PROCEDURE — 10002803 HB RX 637: Performed by: FAMILY MEDICINE

## 2021-07-31 PROCEDURE — 80048 BASIC METABOLIC PNL TOTAL CA: CPT | Performed by: FAMILY MEDICINE

## 2021-07-31 PROCEDURE — 99232 SBSQ HOSP IP/OBS MODERATE 35: CPT | Performed by: FAMILY MEDICINE

## 2021-07-31 PROCEDURE — 85027 COMPLETE CBC AUTOMATED: CPT | Performed by: FAMILY MEDICINE

## 2021-07-31 PROCEDURE — 10004651 HB RX, NO CHARGE ITEM: Performed by: INTERNAL MEDICINE

## 2021-07-31 PROCEDURE — 36415 COLL VENOUS BLD VENIPUNCTURE: CPT | Performed by: FAMILY MEDICINE

## 2021-07-31 PROCEDURE — 10002800 HB RX 250 W HCPCS: Performed by: FAMILY MEDICINE

## 2021-07-31 PROCEDURE — 10002800 HB RX 250 W HCPCS: Performed by: INTERNAL MEDICINE

## 2021-07-31 RX ORDER — MAGNESIUM HYDROXIDE/ALUMINUM HYDROXICE/SIMETHICONE 120; 1200; 1200 MG/30ML; MG/30ML; MG/30ML
20 SUSPENSION ORAL EVERY 4 HOURS PRN
Status: DISCONTINUED | OUTPATIENT
Start: 2021-07-31 | End: 2021-08-03 | Stop reason: HOSPADM

## 2021-07-31 RX ADMIN — LORAZEPAM 2 MG: 2 TABLET ORAL at 22:42

## 2021-07-31 RX ADMIN — SODIUM CHLORIDE, PRESERVATIVE FREE 2 ML: 5 INJECTION INTRAVENOUS at 09:03

## 2021-07-31 RX ADMIN — ONDANSETRON 4 MG: 2 INJECTION INTRAMUSCULAR; INTRAVENOUS at 16:12

## 2021-07-31 RX ADMIN — ONDANSETRON 4 MG: 2 INJECTION INTRAMUSCULAR; INTRAVENOUS at 09:03

## 2021-07-31 RX ADMIN — ENOXAPARIN SODIUM 40 MG: 100 INJECTION SUBCUTANEOUS at 10:00

## 2021-07-31 RX ADMIN — LORAZEPAM 2 MG: 2 TABLET ORAL at 10:21

## 2021-07-31 RX ADMIN — ALUMINA, MAGNESIA, AND SIMETHICONE ORAL SUSPENSION REGULAR STRENGTH 20 ML: 1200; 1200; 120 SUSPENSION ORAL at 05:38

## 2021-07-31 RX ADMIN — ATORVASTATIN CALCIUM 20 MG: 20 TABLET, FILM COATED ORAL at 10:00

## 2021-07-31 RX ADMIN — SODIUM CHLORIDE, PRESERVATIVE FREE 2 ML: 5 INJECTION INTRAVENOUS at 21:18

## 2021-07-31 RX ADMIN — CLONAZEPAM 0.5 MG: 0.5 TABLET ORAL at 10:00

## 2021-07-31 RX ADMIN — AMLODIPINE BESYLATE 5 MG: 5 TABLET ORAL at 10:00

## 2021-07-31 RX ADMIN — ONDANSETRON 4 MG: 2 INJECTION INTRAMUSCULAR; INTRAVENOUS at 23:22

## 2021-07-31 RX ADMIN — CLONAZEPAM 0.5 MG: 0.5 TABLET ORAL at 21:17

## 2021-07-31 ASSESSMENT — LIFESTYLE VARIABLES
ANXIETY: MILDLY ANXIOUS
HEADACHE, FULLNESS IN HEAD: NOT PRESENT
PAROXYSMAL SWEATS: BARELY PERCEPTIBLE SWEATING, PALMS MOIST
VISUAL DISTURBANCES: NOT PRESENT
AGITATION: NORMAL ACTIVITY
AGITATION: NORMAL ACTIVITY
NAUSEA AND VOMITING: 3
NAUSEA AND VOMITING: NO NAUSEA AND NO VOMITING
PAROXYSMAL SWEATS: BARELY PERCEPTIBLE SWEATING, PALMS MOIST
AUDITORY DISTURBANCES: NOT PRESENT
AUDITORY DISTURBANCES: NOT PRESENT
TREMOR: 2
ANXIETY: 2
VISUAL DISTURBANCES: NOT PRESENT
TREMOR: NOT VISIBLE, BUT CAN BE FELT FINGERTIP TO FINGERTIP
HEADACHE, FULLNESS IN HEAD: VERY MILD

## 2021-07-31 ASSESSMENT — PAIN SCALES - GENERAL
PAINLEVEL_OUTOF10: 0
PAINLEVEL_OUTOF10: 5

## 2021-08-01 PROCEDURE — 10002800 HB RX 250 W HCPCS: Performed by: INTERNAL MEDICINE

## 2021-08-01 PROCEDURE — 10002803 HB RX 637: Performed by: INTERNAL MEDICINE

## 2021-08-01 PROCEDURE — 10002803 HB RX 637: Performed by: PSYCHIATRY & NEUROLOGY

## 2021-08-01 PROCEDURE — 10004651 HB RX, NO CHARGE ITEM: Performed by: INTERNAL MEDICINE

## 2021-08-01 PROCEDURE — 10002800 HB RX 250 W HCPCS: Performed by: FAMILY MEDICINE

## 2021-08-01 PROCEDURE — 10000002 HB ROOM CHARGE MED SURG

## 2021-08-01 PROCEDURE — 99233 SBSQ HOSP IP/OBS HIGH 50: CPT | Performed by: INTERNAL MEDICINE

## 2021-08-01 PROCEDURE — 10002803 HB RX 637: Performed by: FAMILY MEDICINE

## 2021-08-01 RX ADMIN — ENOXAPARIN SODIUM 40 MG: 100 INJECTION SUBCUTANEOUS at 08:52

## 2021-08-01 RX ADMIN — ONDANSETRON 4 MG: 2 INJECTION INTRAMUSCULAR; INTRAVENOUS at 22:25

## 2021-08-01 RX ADMIN — CLONAZEPAM 0.5 MG: 0.5 TABLET ORAL at 08:52

## 2021-08-01 RX ADMIN — ATORVASTATIN CALCIUM 20 MG: 20 TABLET, FILM COATED ORAL at 08:52

## 2021-08-01 RX ADMIN — LORAZEPAM 2 MG: 2 TABLET ORAL at 20:47

## 2021-08-01 RX ADMIN — LORAZEPAM 2 MG: 2 TABLET ORAL at 17:06

## 2021-08-01 RX ADMIN — Medication 25 MCG: at 08:52

## 2021-08-01 RX ADMIN — GABAPENTIN 400 MG: 100 CAPSULE ORAL at 05:36

## 2021-08-01 RX ADMIN — AMLODIPINE BESYLATE 5 MG: 5 TABLET ORAL at 08:52

## 2021-08-01 RX ADMIN — ONDANSETRON 4 MG: 2 INJECTION INTRAMUSCULAR; INTRAVENOUS at 08:52

## 2021-08-01 RX ADMIN — SODIUM CHLORIDE, PRESERVATIVE FREE 2 ML: 5 INJECTION INTRAVENOUS at 20:49

## 2021-08-01 RX ADMIN — SODIUM CHLORIDE, PRESERVATIVE FREE 2 ML: 5 INJECTION INTRAVENOUS at 08:53

## 2021-08-01 RX ADMIN — CLONAZEPAM 0.5 MG: 0.5 TABLET ORAL at 20:46

## 2021-08-01 ASSESSMENT — LIFESTYLE VARIABLES
ANXIETY: MILDLY ANXIOUS
AGITATION: NORMAL ACTIVITY
NAUSEA AND VOMITING: MILD NAUSEA WITH NO VOMITING
PAROXYSMAL SWEATS: 2
TREMOR: 3
AUDITORY DISTURBANCES: NOT PRESENT
HEADACHE, FULLNESS IN HEAD: VERY MILD
VISUAL DISTURBANCES: NOT PRESENT

## 2021-08-01 ASSESSMENT — PAIN SCALES - GENERAL
PAINLEVEL_OUTOF10: 5
PAINLEVEL_OUTOF10: 0

## 2021-08-02 ENCOUNTER — TELEPHONE (OUTPATIENT)
Dept: INTERNAL MEDICINE CLINIC | Facility: CLINIC | Age: 71
End: 2021-08-02

## 2021-08-02 ENCOUNTER — TELEPHONE (OUTPATIENT)
Dept: BEHAVIORAL HEALTH | Age: 71
End: 2021-08-02

## 2021-08-02 PROCEDURE — 10002803 HB RX 637: Performed by: PSYCHIATRY & NEUROLOGY

## 2021-08-02 PROCEDURE — 10002800 HB RX 250 W HCPCS: Performed by: INTERNAL MEDICINE

## 2021-08-02 PROCEDURE — 10000002 HB ROOM CHARGE MED SURG

## 2021-08-02 PROCEDURE — 99233 SBSQ HOSP IP/OBS HIGH 50: CPT | Performed by: INTERNAL MEDICINE

## 2021-08-02 PROCEDURE — 10002803 HB RX 637: Performed by: INTERNAL MEDICINE

## 2021-08-02 PROCEDURE — 10004651 HB RX, NO CHARGE ITEM: Performed by: INTERNAL MEDICINE

## 2021-08-02 PROCEDURE — 10002803 HB RX 637: Performed by: FAMILY MEDICINE

## 2021-08-02 PROCEDURE — 10002800 HB RX 250 W HCPCS: Performed by: FAMILY MEDICINE

## 2021-08-02 RX ADMIN — ATORVASTATIN CALCIUM 20 MG: 20 TABLET, FILM COATED ORAL at 08:48

## 2021-08-02 RX ADMIN — SODIUM CHLORIDE, PRESERVATIVE FREE 2 ML: 5 INJECTION INTRAVENOUS at 20:59

## 2021-08-02 RX ADMIN — AMLODIPINE BESYLATE 5 MG: 5 TABLET ORAL at 08:49

## 2021-08-02 RX ADMIN — CLONAZEPAM 0.5 MG: 0.5 TABLET ORAL at 08:49

## 2021-08-02 RX ADMIN — SODIUM CHLORIDE, PRESERVATIVE FREE 2 ML: 5 INJECTION INTRAVENOUS at 08:50

## 2021-08-02 RX ADMIN — ONDANSETRON 4 MG: 2 INJECTION INTRAMUSCULAR; INTRAVENOUS at 20:59

## 2021-08-02 RX ADMIN — ENOXAPARIN SODIUM 40 MG: 100 INJECTION SUBCUTANEOUS at 08:49

## 2021-08-02 RX ADMIN — LORAZEPAM 2 MG: 2 TABLET ORAL at 13:51

## 2021-08-02 RX ADMIN — GABAPENTIN 400 MG: 100 CAPSULE ORAL at 05:17

## 2021-08-02 ASSESSMENT — LIFESTYLE VARIABLES
PAROXYSMAL SWEATS: NO SWEAT VISIBLE
TREMOR: NO TREMOR
ANXIETY: MILDLY ANXIOUS
ANXIETY: MILDLY ANXIOUS
HEADACHE, FULLNESS IN HEAD: NOT PRESENT
PAROXYSMAL SWEATS: NO SWEAT VISIBLE
TREMOR: NO TREMOR
NAUSEA AND VOMITING: NO NAUSEA AND NO VOMITING
VISUAL DISTURBANCES: NOT PRESENT
PAROXYSMAL SWEATS: 2
AGITATION: SOMEWHAT MORE THAN NORMAL ACTIVITY
AUDITORY DISTURBANCES: NOT PRESENT
VISUAL DISTURBANCES: NOT PRESENT
HEADACHE, FULLNESS IN HEAD: NOT PRESENT
AUDITORY DISTURBANCES: NOT PRESENT
HEADACHE, FULLNESS IN HEAD: NOT PRESENT
HEADACHE, FULLNESS IN HEAD: NOT PRESENT
VISUAL DISTURBANCES: NOT PRESENT
AGITATION: SOMEWHAT MORE THAN NORMAL ACTIVITY
ANXIETY: MILDLY ANXIOUS
AUDITORY DISTURBANCES: NOT PRESENT
ANXIETY: MILDLY ANXIOUS
VISUAL DISTURBANCES: NOT PRESENT
AGITATION: SOMEWHAT MORE THAN NORMAL ACTIVITY
NAUSEA AND VOMITING: NO NAUSEA AND NO VOMITING
AUDITORY DISTURBANCES: NOT PRESENT
NAUSEA AND VOMITING: NO NAUSEA AND NO VOMITING
NAUSEA AND VOMITING: NO NAUSEA AND NO VOMITING
AUDITORY DISTURBANCES: NOT PRESENT
AGITATION: NORMAL ACTIVITY
TREMOR: NO TREMOR
VISUAL DISTURBANCES: NOT PRESENT
HEADACHE, FULLNESS IN HEAD: NOT PRESENT
HEADACHE, FULLNESS IN HEAD: NOT PRESENT
TREMOR: 2
NAUSEA AND VOMITING: NO NAUSEA AND NO VOMITING
ANXIETY: MILDLY ANXIOUS
AGITATION: SOMEWHAT MORE THAN NORMAL ACTIVITY
PAROXYSMAL SWEATS: NO SWEAT VISIBLE
VISUAL DISTURBANCES: NOT PRESENT
ANXIETY: NO ANXIETY, AT EASE
AGITATION: SOMEWHAT MORE THAN NORMAL ACTIVITY
AUDITORY DISTURBANCES: NOT PRESENT
HEADACHE, FULLNESS IN HEAD: NOT PRESENT
ANXIETY: MILDLY ANXIOUS
TREMOR: NO TREMOR
TREMOR: NO TREMOR
HEADACHE, FULLNESS IN HEAD: NOT PRESENT
TREMOR: NOT VISIBLE, BUT CAN BE FELT FINGERTIP TO FINGERTIP
AUDITORY DISTURBANCES: NOT PRESENT
PAROXYSMAL SWEATS: NO SWEAT VISIBLE
NAUSEA AND VOMITING: NO NAUSEA AND NO VOMITING
NAUSEA AND VOMITING: NO NAUSEA AND NO VOMITING
AGITATION: SOMEWHAT MORE THAN NORMAL ACTIVITY
TREMOR: NO TREMOR
NAUSEA AND VOMITING: NO NAUSEA AND NO VOMITING
PAROXYSMAL SWEATS: NO SWEAT VISIBLE
ANXIETY: 2
AGITATION: 2
AUDITORY DISTURBANCES: NOT PRESENT
VISUAL DISTURBANCES: NOT PRESENT
VISUAL DISTURBANCES: NOT PRESENT

## 2021-08-02 ASSESSMENT — PAIN SCALES - GENERAL
PAINLEVEL_OUTOF10: 0
PAINLEVEL_OUTOF10: 0

## 2021-08-02 NOTE — TELEPHONE ENCOUNTER
Rita Laguna stated patient is currently in [place] and recommendations for home health have been placed.

## 2021-08-03 VITALS
OXYGEN SATURATION: 92 % | TEMPERATURE: 98.4 F | DIASTOLIC BLOOD PRESSURE: 78 MMHG | HEIGHT: 68 IN | BODY MASS INDEX: 27.28 KG/M2 | WEIGHT: 180 LBS | HEART RATE: 88 BPM | RESPIRATION RATE: 16 BRPM | SYSTOLIC BLOOD PRESSURE: 115 MMHG

## 2021-08-03 PROCEDURE — 10002800 HB RX 250 W HCPCS: Performed by: FAMILY MEDICINE

## 2021-08-03 PROCEDURE — 10002803 HB RX 637: Performed by: NURSE PRACTITIONER

## 2021-08-03 PROCEDURE — 99239 HOSP IP/OBS DSCHRG MGMT >30: CPT | Performed by: INTERNAL MEDICINE

## 2021-08-03 PROCEDURE — 10002803 HB RX 637: Performed by: PSYCHIATRY & NEUROLOGY

## 2021-08-03 PROCEDURE — 97116 GAIT TRAINING THERAPY: CPT

## 2021-08-03 RX ADMIN — ALUMINA, MAGNESIA, AND SIMETHICONE ORAL SUSPENSION REGULAR STRENGTH 20 ML: 1200; 1200; 120 SUSPENSION ORAL at 13:23

## 2021-08-03 RX ADMIN — ONDANSETRON 4 MG: 2 INJECTION INTRAMUSCULAR; INTRAVENOUS at 05:40

## 2021-08-03 RX ADMIN — LORAZEPAM 2 MG: 2 TABLET ORAL at 08:42

## 2021-08-03 ASSESSMENT — COGNITIVE AND FUNCTIONAL STATUS - GENERAL
BASIC_MOBILITY_CONVERTED_SCORE: 43.99
BASIC_MOBILITY_RAW_SCORE: 20

## 2021-08-03 ASSESSMENT — LIFESTYLE VARIABLES
VISUAL DISTURBANCES: NOT PRESENT
AUDITORY DISTURBANCES: NOT PRESENT
NAUSEA AND VOMITING: NO NAUSEA AND NO VOMITING
HEADACHE, FULLNESS IN HEAD: NOT PRESENT
TREMOR: NO TREMOR
ANXIETY: 2
PAROXYSMAL SWEATS: NO SWEAT VISIBLE
AGITATION: 2

## 2021-08-03 ASSESSMENT — PAIN SCALES - GENERAL: PAINLEVEL_OUTOF10: 0

## 2021-08-07 ENCOUNTER — IMMUNIZATION (OUTPATIENT)
Dept: LAB | Facility: HOSPITAL | Age: 71
End: 2021-08-07
Attending: EMERGENCY MEDICINE
Payer: MEDICARE

## 2021-08-07 DIAGNOSIS — Z23 NEED FOR VACCINATION: Primary | ICD-10-CM

## 2021-08-07 PROCEDURE — 0001A SARSCOV2 VAC 30MCG/0.3ML IM: CPT

## 2021-08-12 ENCOUNTER — OFFICE VISIT (OUTPATIENT)
Dept: INTERNAL MEDICINE CLINIC | Facility: CLINIC | Age: 71
End: 2021-08-12
Payer: MEDICARE

## 2021-08-12 VITALS
HEIGHT: 68 IN | BODY MASS INDEX: 28.04 KG/M2 | SYSTOLIC BLOOD PRESSURE: 122 MMHG | RESPIRATION RATE: 14 BRPM | OXYGEN SATURATION: 96 % | HEART RATE: 88 BPM | WEIGHT: 185 LBS | DIASTOLIC BLOOD PRESSURE: 70 MMHG

## 2021-08-12 DIAGNOSIS — F10.21 ALCOHOL DEPENDENCE IN REMISSION (HCC): ICD-10-CM

## 2021-08-12 DIAGNOSIS — Z12.11 SCREEN FOR COLON CANCER: ICD-10-CM

## 2021-08-12 DIAGNOSIS — H93.13 TINNITUS OF BOTH EARS: Primary | ICD-10-CM

## 2021-08-12 DIAGNOSIS — F41.1 ANXIETY STATE: ICD-10-CM

## 2021-08-12 PROBLEM — F10.20 ALCOHOL DEPENDENCE (HCC): Status: ACTIVE | Noted: 2021-07-23

## 2021-08-12 PROCEDURE — 1111F DSCHRG MED/CURRENT MED MERGE: CPT | Performed by: INTERNAL MEDICINE

## 2021-08-12 PROCEDURE — 99214 OFFICE O/P EST MOD 30 MIN: CPT | Performed by: INTERNAL MEDICINE

## 2021-08-12 RX ORDER — CLONAZEPAM 0.5 MG/1
0.5 TABLET ORAL 2 TIMES DAILY PRN
COMMUNITY
End: 2021-08-25

## 2021-08-12 RX ORDER — TRAZODONE HYDROCHLORIDE 50 MG/1
50 TABLET ORAL
COMMUNITY
Start: 2021-08-09 | End: 2021-10-07

## 2021-08-12 RX ORDER — GABAPENTIN 400 MG/1
400 CAPSULE ORAL 3 TIMES DAILY
COMMUNITY
End: 2021-10-07

## 2021-08-12 RX ORDER — AMLODIPINE BESYLATE 5 MG/1
5 TABLET ORAL DAILY
COMMUNITY
End: 2021-10-07

## 2021-08-13 NOTE — PROGRESS NOTES
Mark Gamez is a 80year old male. Patient presents with:  Hospital F/U    HPI:   76-year-old gentleman with a past medical history of catastrophic tinnitus, hypertension, alcohol abuse, dyslipidemia here for follow-up. He is accompanied by his wife. shots/day    Drug use: No     Family History   Problem Relation Age of Onset   • Hypertension Maternal Grandmother    • Other (Other) Father         parkinson disease   • Dementia Mother    • Heart Attack Sister       No Known Allergies     REVIEW OF SYSTE deficit present. Mental Status: He is alert and oriented to person, place, and time. Mental status is at baseline. Psychiatric:         Mood and Affect: Mood normal.         Behavior: Behavior normal.            ASSESSMENT AND PLAN:   1.  Tinnitus of

## 2021-08-25 ENCOUNTER — TELEPHONE (OUTPATIENT)
Dept: INTERNAL MEDICINE CLINIC | Facility: CLINIC | Age: 71
End: 2021-08-25

## 2021-08-25 DIAGNOSIS — R53.83 FATIGUE, UNSPECIFIED TYPE: Primary | ICD-10-CM

## 2021-08-25 RX ORDER — CLONAZEPAM 0.5 MG/1
0.5 TABLET ORAL 2 TIMES DAILY PRN
Qty: 60 TABLET | Refills: 0 | Status: SHIPPED | OUTPATIENT
Start: 2021-08-25 | End: 2021-09-24

## 2021-08-25 NOTE — TELEPHONE ENCOUNTER
Dr. Mabel Tony:   Patient would like to speak with you. He feels he is going to withdrawals. Patient feels weak, unable to sleep for several days, tinnitus, feels anxiety. No abd, no vomiting, no nausea, no sweats. LOV 8/12/21; He was taken off xanax.

## 2021-08-25 NOTE — TELEPHONE ENCOUNTER
Talked with the patient. He feels miserable. He was on high dose of Xanax and currently he is off Xanax and clonazepam.  He is reporting that he cannot live like this. I have given him Klonopin 0.5 mg to take it every 12 hours as needed.   We will try to

## 2021-08-26 ENCOUNTER — TELEPHONE (OUTPATIENT)
Dept: INTERNAL MEDICINE CLINIC | Facility: CLINIC | Age: 71
End: 2021-08-26

## 2021-08-26 NOTE — TELEPHONE ENCOUNTER
Spoke to Trish at [facility] and relayed Dr. Anthony Even message below. Sulma- Issa-D verbalized understanding and had no further questions.

## 2021-08-26 NOTE — TELEPHONE ENCOUNTER
Pharmacy wanted to know if  still wants to continue with medication since the hospital tried to discontinue this for patient. Rx clonazepam 0.5MG.  please advise         Current Outpatient Medications   Medication Sig Dispense Refill   • clonazePAM 0.5 MG

## 2021-09-03 NOTE — TELEPHONE ENCOUNTER
Spoke to patient. He is complaining of extreme fatigue despite sleeping (10 hours last night). He stated he \"feels horrible. \" He was able to walk without fatigue until a few days ago and now, he can only walk to the end of the block without feeling extre

## 2021-09-03 NOTE — TELEPHONE ENCOUNTER
I have placed the order for CBC, CMP, TSH. Please let him complete.   If he has any worsening symptoms, I agree that he needs to be evaluated in the  emergency room thank you

## 2021-09-11 ENCOUNTER — LAB ENCOUNTER (OUTPATIENT)
Dept: LAB | Facility: HOSPITAL | Age: 71
End: 2021-09-11
Attending: INTERNAL MEDICINE
Payer: MEDICARE

## 2021-09-11 DIAGNOSIS — R53.83 FATIGUE, UNSPECIFIED TYPE: ICD-10-CM

## 2021-09-11 LAB
ALBUMIN SERPL-MCNC: 3.8 G/DL (ref 3.4–5)
ALBUMIN/GLOB SERPL: 1.3 {RATIO} (ref 1–2)
ALP LIVER SERPL-CCNC: 87 U/L
ALT SERPL-CCNC: 50 U/L
ANION GAP SERPL CALC-SCNC: 6 MMOL/L (ref 0–18)
AST SERPL-CCNC: 25 U/L (ref 15–37)
BILIRUB SERPL-MCNC: 0.8 MG/DL (ref 0.1–2)
BUN BLD-MCNC: 20 MG/DL (ref 7–18)
BUN/CREAT SERPL: 19.8 (ref 10–20)
CALCIUM BLD-MCNC: 10.2 MG/DL (ref 8.5–10.1)
CHLORIDE SERPL-SCNC: 110 MMOL/L (ref 98–112)
CO2 SERPL-SCNC: 28 MMOL/L (ref 21–32)
CREAT BLD-MCNC: 1.01 MG/DL
DEPRECATED RDW RBC AUTO: 45.1 FL (ref 35.1–46.3)
ERYTHROCYTE [DISTWIDTH] IN BLOOD BY AUTOMATED COUNT: 12.3 % (ref 11–15)
GLOBULIN PLAS-MCNC: 3 G/DL (ref 2.8–4.4)
GLUCOSE BLD-MCNC: 92 MG/DL (ref 70–99)
HCT VFR BLD AUTO: 48.8 %
HGB BLD-MCNC: 15.9 G/DL
M PROTEIN MFR SERPL ELPH: 6.8 G/DL (ref 6.4–8.2)
MCH RBC QN AUTO: 32.2 PG (ref 26–34)
MCHC RBC AUTO-ENTMCNC: 32.6 G/DL (ref 31–37)
MCV RBC AUTO: 98.8 FL
OSMOLALITY SERPL CALC.SUM OF ELEC: 300 MOSM/KG (ref 275–295)
PATIENT FASTING Y/N/NP: YES
PLATELET # BLD AUTO: 255 10(3)UL (ref 150–450)
POTASSIUM SERPL-SCNC: 3.8 MMOL/L (ref 3.5–5.1)
RBC # BLD AUTO: 4.94 X10(6)UL
SODIUM SERPL-SCNC: 144 MMOL/L (ref 136–145)
TSI SER-ACNC: 3.6 MIU/ML (ref 0.36–3.74)
WBC # BLD AUTO: 7.9 X10(3) UL (ref 4–11)

## 2021-09-11 PROCEDURE — 80053 COMPREHEN METABOLIC PANEL: CPT

## 2021-09-11 PROCEDURE — 36415 COLL VENOUS BLD VENIPUNCTURE: CPT

## 2021-09-11 PROCEDURE — 85027 COMPLETE CBC AUTOMATED: CPT

## 2021-09-11 PROCEDURE — 84443 ASSAY THYROID STIM HORMONE: CPT

## 2021-09-14 ENCOUNTER — TELEPHONE (OUTPATIENT)
Dept: INTERNAL MEDICINE CLINIC | Facility: CLINIC | Age: 71
End: 2021-09-14

## 2021-09-14 RX ORDER — PAROXETINE HYDROCHLORIDE 20 MG/1
20 TABLET, FILM COATED ORAL DAILY
Qty: 30 TABLET | Refills: 0 | Status: SHIPPED | OUTPATIENT
Start: 2021-09-14 | End: 2021-10-14

## 2021-09-14 NOTE — TELEPHONE ENCOUNTER
My understanding is that patient was taking paroxetine for a long time then it was stopped few weeks back. If that is the case, I do not think paroxetine withdrawal is the cause of his symptoms.     He needs to understand that he was on a lot of medication

## 2021-09-14 NOTE — TELEPHONE ENCOUNTER
Seeking further advice? ??  I can only help him to a certain extent. I have sent 20 mg of paroxetine to the pharmacy. He can start taking it.   He needs to find a psychiatrist.  Please provide the name and phone number for Pierre Cooper or Dr. Douglass Meals

## 2021-09-14 NOTE — TELEPHONE ENCOUNTER
Dr. Reddy Peña please advise if patient should restart paroxetine 40mg to decrease withdrawal symptoms    Patient calling again about the paroxetine 40 mg, asking if he could restart the medication.  Was instructed to call the office back in an hour and a half

## 2021-09-14 NOTE — TELEPHONE ENCOUNTER
Relayed Dr message, Pt stated he do not have a Psych Dr and the Rehab team refuse to talk to him  Stated he's not sleeping , seeking further advice

## 2021-09-14 NOTE — TELEPHONE ENCOUNTER
Patient calling with complaint of fatigue, insomnia, and irritability which he attributes to withdrawal from discontinuation of Paroxetine 40 mg daily.    Per patient, he reviewed a reputable source online stating that he should have been weaned off the Par

## 2021-09-15 NOTE — TELEPHONE ENCOUNTER
Pt called and informed of below information. He states he was taking Xanax 3.5mg daily and was taken off in a month's time. He states he has had depression his whole life and his whole family suffers from it.  He feels that weaning him off in one month was

## 2021-09-16 NOTE — TELEPHONE ENCOUNTER
Patient contacted, Dr Jeff Alvarado message was relayed. The patient has appointment with Dr Dk Chicas on 11/16 and will need a refill of Klonipin in about 2 weeks.  He will have his pharmacy make the request

## 2021-09-16 NOTE — TELEPHONE ENCOUNTER
Spoke with pt,  verified, pt stated he tried to get an appt with Dr Astrid Bhatia (psyche), he spoke with a nurse but his next available is on 2021.   Pt stated he will run out of Klonopine 0.5 mg before his next ov with Dr Tatyana Chandler, so he would like him

## 2021-09-24 RX ORDER — CLONAZEPAM 0.5 MG/1
0.5 TABLET ORAL 2 TIMES DAILY PRN
Qty: 60 TABLET | Refills: 0 | Status: SHIPPED | OUTPATIENT
Start: 2021-09-24 | End: 2021-12-21

## 2021-09-24 NOTE — TELEPHONE ENCOUNTER
Please review. Protocol failed / No protocol.     Requested Prescriptions   Pending Prescriptions Disp Refills    CLONAZEPAM 0.5 MG Oral Tab [Pharmacy Med Name: CLONAZEPAM 0.5MG TABLETS] 60 tablet 0     Sig: TAKE 1 TABLET(0.5 MG) BY MOUTH TWICE DAILY AS NE

## 2021-10-07 ENCOUNTER — LAB ENCOUNTER (OUTPATIENT)
Dept: LAB | Facility: HOSPITAL | Age: 71
End: 2021-10-07
Attending: INTERNAL MEDICINE
Payer: MEDICARE

## 2021-10-07 PROCEDURE — 82274 ASSAY TEST FOR BLOOD FECAL: CPT | Performed by: INTERNAL MEDICINE

## 2021-10-07 NOTE — PROGRESS NOTES
HPI:   Miguel Fairbanks is a 80year old male who presents for a Medicare Subsequent Annual Wellness visit (Pt already had Initial Annual Wellness).     29-year-old gentleman with a past medical history of depression, catastrophic tinnitus, alcohol abuse i screening of functional status.    Problems with daily activities? : Yes     His wife helps with activities of daily living  Depression Screening (PHQ-2/PHQ-9): Over the LAST 2 WEEKS   Little interest or pleasure in doing things: More than half the days  Fe has never smoked tobacco.    CAGE Alcohol Screen abnormal    He has been screened for EtOH abuse and his score is not 0:    Cut: Have you ever felt you should Cut down on your drinking?: 1, Annoyed: Have people Annoyed you by criticizing your drinking?: 0, Tab, Take 1 tablet (20 mg total) by mouth nightly.        MEDICAL INFORMATION:   He  has a past medical history of Altered mental status (12/30/2020), Brain bleed ([facility] [address] Colon polyp (05/2017), Head trauma (4/12/2021), High cholesterol, IFG (impaired fasting arm, Patient Position: Sitting, Cuff Size: adult)   Pulse 74   Resp 14   Ht 5' 8\" (1.727 m)   Wt 175 lb (79.4 kg)   SpO2 95%   BMI 26.61 kg/m²   Estimated body mass index is 26.61 kg/m² as calculated from the following:    Height as of this encounter: 5' appearance. HENT:      Head: Normocephalic. Right Ear: Tympanic membrane normal.      Left Ear: Tympanic membrane normal.      Nose: Nose normal.      Mouth/Throat:      Mouth: Mucous membranes are moist.      Pharynx: Oropharynx is clear.    Eyes: Zoster Vaccine Recombinant Adjuvanted (Shingrix) 09/18/2019, 02/20/2020, 09/28/2020        ASSESSMENT AND OTHER RELEVANT CHRONIC CONDITIONS:   Jayce Issa is a 80year old male who presents for a Medicare Assessment.      PLAN SUMMARY:   Diagnoses and Other  How would you describe your daily physical activity?: Light  How would you describe your current health state?: Poor     Supplementary Documentation:   Sarbjit Vidal SCREENING SCHEDULE   Tests on this list are recommended by your physician but PSA due on 05/13/2022   Immunizations    Influenza Covered once per flu season  Please get every year -  Influenza Vaccine(1) due on 10/01/2021    Pneumococcal Each vaccine (Zndodba19 & Zpcnlnbpd45) covered once after 65 Prevnar 13: 11/19/2018    Pneum

## 2021-10-11 ENCOUNTER — IMMUNIZATION (OUTPATIENT)
Dept: LAB | Facility: HOSPITAL | Age: 71
End: 2021-10-11
Attending: EMERGENCY MEDICINE
Payer: MEDICARE

## 2021-10-11 DIAGNOSIS — Z23 NEED FOR VACCINATION: Primary | ICD-10-CM

## 2021-10-11 PROCEDURE — 0002A SARSCOV2 VAC 30MCG/0.3ML IM: CPT

## 2021-10-28 ENCOUNTER — OFFICE VISIT (OUTPATIENT)
Dept: OTOLARYNGOLOGY | Facility: CLINIC | Age: 71
End: 2021-10-28
Payer: MEDICARE

## 2021-10-28 VITALS — BODY MASS INDEX: 25.01 KG/M2 | HEIGHT: 68 IN | WEIGHT: 165 LBS | TEMPERATURE: 98 F

## 2021-10-28 DIAGNOSIS — H61.23 BILATERAL IMPACTED CERUMEN: Primary | ICD-10-CM

## 2021-10-28 PROCEDURE — G0268 REMOVAL OF IMPACTED WAX MD: HCPCS | Performed by: OTOLARYNGOLOGY

## 2021-10-28 NOTE — PROGRESS NOTES
Norman Resendez is a 80year old male.   Patient presents with:  Cerumen Impaction: pt is here for ear cleaning      HISTORY OF PRESENT ILLNESS    Patient presents for cerumen removal. No other complaints or concerns at this time    Social History    Soci depression. Integumentary Negative Frequent skin infections, pigment change and rash. Hema/Lymph Negative Easy bleeding and easy bruising.            PHYSICAL EXAM    Temp 98.2 °F (36.8 °C) (Tympanic)   Ht 5' 8\" (1.727 m)   Wt 165 lb (74.8 kg)   BMI 25

## 2021-11-04 ENCOUNTER — HOSPITAL ENCOUNTER (EMERGENCY)
Facility: HOSPITAL | Age: 71
Discharge: HOME | End: 2021-11-04
Attending: EMERGENCY MEDICINE
Payer: MEDICARE

## 2021-11-04 ENCOUNTER — APPOINTMENT (OUTPATIENT)
Dept: CT IMAGING | Facility: HOSPITAL | Age: 71
End: 2021-11-04
Attending: EMERGENCY MEDICINE
Payer: MEDICARE

## 2021-11-04 VITALS
BODY MASS INDEX: 24.42 KG/M2 | OXYGEN SATURATION: 94 % | RESPIRATION RATE: 16 BRPM | DIASTOLIC BLOOD PRESSURE: 80 MMHG | HEART RATE: 110 BPM | SYSTOLIC BLOOD PRESSURE: 124 MMHG | HEIGHT: 68.5 IN | WEIGHT: 163 LBS | TEMPERATURE: 99 F

## 2021-11-04 DIAGNOSIS — K57.92 ACUTE DIVERTICULITIS: Primary | ICD-10-CM

## 2021-11-04 PROCEDURE — 85025 COMPLETE CBC W/AUTO DIFF WBC: CPT | Performed by: EMERGENCY MEDICINE

## 2021-11-04 PROCEDURE — 80076 HEPATIC FUNCTION PANEL: CPT | Performed by: EMERGENCY MEDICINE

## 2021-11-04 PROCEDURE — 36415 COLL VENOUS BLD VENIPUNCTURE: CPT

## 2021-11-04 PROCEDURE — 80048 BASIC METABOLIC PNL TOTAL CA: CPT | Performed by: EMERGENCY MEDICINE

## 2021-11-04 PROCEDURE — 99284 EMERGENCY DEPT VISIT MOD MDM: CPT

## 2021-11-04 PROCEDURE — 74177 CT ABD & PELVIS W/CONTRAST: CPT | Performed by: EMERGENCY MEDICINE

## 2021-11-04 PROCEDURE — 83690 ASSAY OF LIPASE: CPT | Performed by: EMERGENCY MEDICINE

## 2021-11-04 RX ORDER — HYDROCODONE BITARTRATE AND ACETAMINOPHEN 5; 325 MG/1; MG/1
2 TABLET ORAL ONCE
Status: COMPLETED | OUTPATIENT
Start: 2021-11-04 | End: 2021-11-04

## 2021-11-04 RX ORDER — AMOXICILLIN AND CLAVULANATE POTASSIUM 875; 125 MG/1; MG/1
1 TABLET, FILM COATED ORAL 2 TIMES DAILY
Qty: 20 TABLET | Refills: 0 | Status: SHIPPED | OUTPATIENT
Start: 2021-11-04 | End: 2021-11-08 | Stop reason: ALTCHOICE

## 2021-11-04 RX ORDER — AMOXICILLIN AND CLAVULANATE POTASSIUM 875; 125 MG/1; MG/1
875 TABLET, FILM COATED ORAL ONCE
Status: COMPLETED | OUTPATIENT
Start: 2021-11-04 | End: 2021-11-04

## 2021-11-05 NOTE — ED PROVIDER NOTES
Patient Seen in: [facility] AND [facility] Emergency Department    History   Patient presents with:  Abdomen/Flank Pain      HPI    The patient presents to the ED complaining of right upper abdominal pain starting around 330 in this morning.  He states pain is sl reviewed and are negative. Constitutional and vital signs reviewed. Social History and Family History elements reviewed from today, pertinent positives to the presenting problem noted.     Physical Exam     ED Triage Vitals [11/04/21 1741]   BP (!) (8) - Abnormal; Notable for the following components:       Result Value    Glucose 106 (*)     Calcium, Total 10.8 (*)     All other components within normal limits   HEPATIC FUNCTION PANEL (7) - Abnormal; Notable for the following components:    Bilirubi tablets Oral Given 11/4/21 2011)   amoxicillin clavulanate (AUGMENTIN) 875-125 MG tab 875 mg (875 mg Oral Given 11/4/21 2011)         MDM      11/04/21  1741 11/04/21  1830 11/04/21  1900 11/04/21 2012   BP: (!) 162/97 135/84 122/84 124/80   Pulse: 118 11 [facility] Emergency Department  [facility].  [place] [address]  [phone]    If symptoms worsen      Medications Prescribed:  Discharge Medication List as of 11/4/2021  8:13 PM    START taking these medications    amoxicillin clavulanate

## 2021-11-05 NOTE — ED QUICK NOTES
Discharge instructions given to pt and wife. Pt and wife verbalized understanding of home care, medication use, and to follow up with pcp. Pt and wife denied further questions or concerns. Pt ambulatory out of ED, discharged in stable condition.

## 2021-11-07 ENCOUNTER — PATIENT MESSAGE (OUTPATIENT)
Dept: INTERNAL MEDICINE CLINIC | Facility: CLINIC | Age: 71
End: 2021-11-07

## 2021-11-08 ENCOUNTER — OFFICE VISIT (OUTPATIENT)
Dept: INTERNAL MEDICINE CLINIC | Facility: CLINIC | Age: 71
End: 2021-11-08
Payer: MEDICARE

## 2021-11-08 VITALS
WEIGHT: 166.88 LBS | HEART RATE: 89 BPM | SYSTOLIC BLOOD PRESSURE: 130 MMHG | BODY MASS INDEX: 25 KG/M2 | DIASTOLIC BLOOD PRESSURE: 87 MMHG | HEIGHT: 68.5 IN

## 2021-11-08 DIAGNOSIS — K57.92 ACUTE DIVERTICULITIS: Primary | ICD-10-CM

## 2021-11-08 PROCEDURE — 99213 OFFICE O/P EST LOW 20 MIN: CPT | Performed by: INTERNAL MEDICINE

## 2021-11-08 RX ORDER — METRONIDAZOLE 500 MG/1
500 TABLET ORAL 3 TIMES DAILY
Qty: 9 TABLET | Refills: 0 | Status: SHIPPED | OUTPATIENT
Start: 2021-11-08 | End: 2021-11-11

## 2021-11-08 RX ORDER — CIPROFLOXACIN 500 MG/1
500 TABLET, FILM COATED ORAL 2 TIMES DAILY
Qty: 6 TABLET | Refills: 0 | Status: SHIPPED | OUTPATIENT
Start: 2021-11-08 | End: 2021-11-11

## 2021-11-08 RX ORDER — PAROXETINE HYDROCHLORIDE 20 MG/1
20 TABLET, FILM COATED ORAL EVERY MORNING
Status: ON HOLD | COMMUNITY
End: 2021-12-21

## 2021-11-08 NOTE — PATIENT INSTRUCTIONS
Please stop Augmentin. Take Cipro 500 mg twice daily for 3 days. Take metronidazole 500 mg 3 times daily for 3 days. Call or return if symptoms do not continue to go away.   Please schedule a repeat colonoscopy with GI.

## 2021-11-08 NOTE — PROGRESS NOTES
Elroy Kurtz is a 80year old male. Patient presents with:  ER F/U: patient went to [address] [place] [address] on 11/4/21 for Acute Diverticulitis    HPI:    Zunilda Peña presents this afternoon for ER follow-up.     He went to [facility] ER on November 4 with a 2-3-day history mental status 12/30/2020   • Brain bleed [facility])    • Colon polyp 05/2017   • Head trauma 4/12/2021   • High cholesterol    • IFG (impaired fasting glucose) 11/25/2016   • Need for vaccination 11/18/2019   • Physical exam, annual 11/6/2017   • Right upper qu to the plan. The patient is asked to return as needed.     Beck Herrera MD  11/8/2021  1:13 PM

## 2021-11-08 NOTE — TELEPHONE ENCOUNTER
Patient was seen in emergency room on 11/04/21 and was diagnosed with diverticulitis. He states that the antibiotic that he was prescribed is exacerbating the chronic tinnitus that he has.     ER follow up appointment scheduled:    Future Appointments

## 2021-11-10 ENCOUNTER — TELEPHONE (OUTPATIENT)
Dept: INTERNAL MEDICINE CLINIC | Facility: CLINIC | Age: 71
End: 2021-11-10

## 2021-11-10 NOTE — TELEPHONE ENCOUNTER
Typically side effects of hearing loss happens when we  take antibiotics for longer duration. If he is doing okay, then he can stop antibiotics. However there is a possibility of recurrence of diverticulitis. Advance diet very slowly.   Please inform

## 2021-11-10 NOTE — TELEPHONE ENCOUNTER
Patient advised of recommendations, reports has been monitoring diet and staying hydrated, no vomiting, some stomachaches. Reports concerns of feeling weak and loosing 20lbs over last few months.  Also reports concerns that his hearing loss is getting worse

## 2021-11-10 NOTE — TELEPHONE ENCOUNTER
Patient states he received antibiotics in ER for diverticulitis on 11/4 and had follow up visit with Dr. Bettye Rosas on 11/8.     Patient currently on Cipro 500 mg and Flagyl 500 mg, but states he cannot take antibiotics due to side effects of hearing loss and ti

## 2021-11-22 ENCOUNTER — TELEPHONE (OUTPATIENT)
Dept: INTERNAL MEDICINE CLINIC | Facility: CLINIC | Age: 71
End: 2021-11-22

## 2021-11-22 NOTE — TELEPHONE ENCOUNTER
See radha message  Spoke to Pt's wife- Pt has multiple issues   Wife stated Pt always in pain, stated since the knee replacement surgery didn't work trying to walking increases his pain and increase  tinnitus , that he has had for 7 years   Continues to

## 2021-11-22 NOTE — TELEPHONE ENCOUNTER
I really do not know what I can do here. He has chronic problems with tinnitus and recurrent falls for many many years. He was seen by multiple ENT consultants.   If he is physically weak, I think it is better to be evaluated in the emergency room as we c

## 2021-11-22 NOTE — TELEPHONE ENCOUNTER
----- Message from Nicki Glez. Astrid Bartholomew sent at 11/22/2021  7:52 AM CST -----  Regarding: Need Help  I'm contacting you concerning my , Fatou Calvert. He is in a desperate place physically.   He was supposed to have cataract surgery on the 30th, even though it

## 2021-11-23 ENCOUNTER — HOSPITAL ENCOUNTER (EMERGENCY)
Facility: HOSPITAL | Age: 71
Discharge: HOME | End: 2021-11-23
Attending: EMERGENCY MEDICINE
Payer: MEDICARE

## 2021-11-23 ENCOUNTER — APPOINTMENT (OUTPATIENT)
Dept: CT IMAGING | Facility: HOSPITAL | Age: 71
End: 2021-11-23
Attending: EMERGENCY MEDICINE
Payer: MEDICARE

## 2021-11-23 VITALS
BODY MASS INDEX: 23.4 KG/M2 | HEART RATE: 71 BPM | DIASTOLIC BLOOD PRESSURE: 79 MMHG | RESPIRATION RATE: 16 BRPM | OXYGEN SATURATION: 96 % | TEMPERATURE: 97 F | HEIGHT: 69 IN | WEIGHT: 158 LBS | SYSTOLIC BLOOD PRESSURE: 134 MMHG

## 2021-11-23 DIAGNOSIS — R53.83 FATIGUE, UNSPECIFIED TYPE: Primary | ICD-10-CM

## 2021-11-23 PROCEDURE — 36415 COLL VENOUS BLD VENIPUNCTURE: CPT

## 2021-11-23 PROCEDURE — 83735 ASSAY OF MAGNESIUM: CPT | Performed by: EMERGENCY MEDICINE

## 2021-11-23 PROCEDURE — 70450 CT HEAD/BRAIN W/O DYE: CPT | Performed by: EMERGENCY MEDICINE

## 2021-11-23 PROCEDURE — 99284 EMERGENCY DEPT VISIT MOD MDM: CPT

## 2021-11-23 PROCEDURE — 85025 COMPLETE CBC W/AUTO DIFF WBC: CPT

## 2021-11-23 PROCEDURE — 93005 ELECTROCARDIOGRAM TRACING: CPT

## 2021-11-23 PROCEDURE — 84443 ASSAY THYROID STIM HORMONE: CPT | Performed by: EMERGENCY MEDICINE

## 2021-11-23 PROCEDURE — 93010 ELECTROCARDIOGRAM REPORT: CPT | Performed by: EMERGENCY MEDICINE

## 2021-11-23 PROCEDURE — 85025 COMPLETE CBC W/AUTO DIFF WBC: CPT | Performed by: EMERGENCY MEDICINE

## 2021-11-23 PROCEDURE — 80048 BASIC METABOLIC PNL TOTAL CA: CPT | Performed by: EMERGENCY MEDICINE

## 2021-11-23 PROCEDURE — 80048 BASIC METABOLIC PNL TOTAL CA: CPT

## 2021-11-23 PROCEDURE — 81003 URINALYSIS AUTO W/O SCOPE: CPT | Performed by: EMERGENCY MEDICINE

## 2021-11-23 PROCEDURE — 82550 ASSAY OF CK (CPK): CPT | Performed by: EMERGENCY MEDICINE

## 2021-11-23 RX ORDER — HYDROCODONE BITARTRATE AND ACETAMINOPHEN 5; 325 MG/1; MG/1
1 TABLET ORAL EVERY 6 HOURS PRN
Qty: 5 TABLET | Refills: 0 | Status: SHIPPED | OUTPATIENT
Start: 2021-11-23 | End: 2021-12-24

## 2021-11-23 RX ORDER — HYDROCODONE BITARTRATE AND ACETAMINOPHEN 5; 325 MG/1; MG/1
1 TABLET ORAL ONCE
Status: COMPLETED | OUTPATIENT
Start: 2021-11-23 | End: 2021-11-23

## 2021-11-23 NOTE — ED PROVIDER NOTES
Patient Seen in: [facility] [place] AND [facility] Emergency Department      History   Patient presents with:  Fatigue    Stated Complaint: fatigue     Subjective:   HPI    67-year-old male with multiple medical problems recently had a course of antibiotics for uncompli HPI.  Constitutional and vital signs reviewed. All other systems reviewed and negative except as noted above.     Physical Exam     ED Triage Vitals [11/23/21 0617]   /86   Pulse 84   Resp 19   Temp 97.3 °F (36.3 °C)   Temp src Temporal   SpO2 97 orders were created for panel order CBC With Differential With Platelet.   Procedure                               Abnormality         Status                     ---------                               -----------         ------                     CBC W/ D Visualized orbits are unremarkable. CALVARIUM:     No acute osseous abnormality. OTHER:                There is atherosclerotic calcification of the intracranial vasculature.            CONCLUSION:  Mild chronic microvascular ischemic disease without Colonic interposition between the diaphragm and liver. No obstruction, bowel wall thickening, or mesenteric mass. ABDOMINAL WALL: Small fat containing left inguinal hernia. URINARY BLADDER: Normal. ASCITES:   None.  PELVIC ORGANS: Mild-to-moderate prostate

## 2021-12-08 ENCOUNTER — TELEPHONE (OUTPATIENT)
Dept: INTERNAL MEDICINE CLINIC | Facility: CLINIC | Age: 71
End: 2021-12-08

## 2021-12-08 NOTE — TELEPHONE ENCOUNTER
If it is urgent antifatigue risperidone today, I am not sure how we can give medication. It is not highly recommended to take antibiotics before dental procedures after knee surgeries. If he can, he can take amoxicillin 500 mg twice daily for 5 days.   Basia Taylor

## 2021-12-08 NOTE — TELEPHONE ENCOUNTER
Dental office verifying if Mr. Zuinlda Peña needs premedication before his urgent root canal needed today due to having a knee replacement in 2005.  Please send a letter advising yes or no to:   fax [phone]

## 2021-12-08 NOTE — TELEPHONE ENCOUNTER
Patient's wife stated the patient will be getting a root canal tomorrow 12/9 and the dental office would like to know if the patient needs antibiotics.  Patient was prescribed an antibiotic in the past and it caused a reaction with his tinnitus and would ne

## 2021-12-09 NOTE — TELEPHONE ENCOUNTER
I have made the letter stating that patient can take antibiotics. Please fax. Thank you please inform patient as well.

## 2021-12-09 NOTE — TELEPHONE ENCOUNTER
Called patient wife and informed that Letter has been faxed to dental office, patient wife verbalized understanding  Pending confirmation

## 2021-12-09 NOTE — TELEPHONE ENCOUNTER
Called dental office again--currently closed--unable to get more information RE: name/dose of one time abx, if approved by PCP    Dr. Everardo Snyder advise if ok for patient to receive one time dose of abx at dental office today.  If approved, patient need

## 2021-12-09 NOTE — TELEPHONE ENCOUNTER
Spoke with endodontist Dr. Yen Wells regarding abx. He state they usually defer to physician for abx recommendations. He states as long as patient is not immunocompromised, diabetic and total joint is over 3years old he does not feel he recommends abx.

## 2021-12-09 NOTE — TELEPHONE ENCOUNTER
Faxed letter to fax [phone] through [address] since spouse indicated that dentist still did not receive it. Also walked spouse through [address] on where to find the letter. Was able to pull up the letter in [address]

## 2021-12-09 NOTE — TELEPHONE ENCOUNTER
Spoke with patient--following up on Dr. Grewal Amend response below--patient states dental office will give one time dose of abx today, if approved by Dr. Andrea Emmanuel.    Patient declines Amoxicillin 500 mg BID x 5 days, as he is concerned it will affect his hear

## 2021-12-09 NOTE — TELEPHONE ENCOUNTER
I agree with dentist.  It really depends upon patient's Ortho  Ideally, he does not need any antibiotics he can proceed with the procedure. If his Ortho is particular about antibiotics, I'm okay for him to take amoxicillin. I called the below number.

## 2021-12-09 NOTE — TELEPHONE ENCOUNTER
Pt's spouse called back to follow up on letter that was faxed today, states fax number was incorrect and [email or web address]  [phone]. Letter faxed as requested. Confirmation received.

## 2021-12-18 ENCOUNTER — HOSPITAL ENCOUNTER (OUTPATIENT)
Facility: HOSPITAL | Age: 71
Setting detail: OBSERVATION
Discharge: NURSING, REHAB OR LONG-TERM CARE | End: 2021-12-21
Attending: EMERGENCY MEDICINE | Admitting: INTERNAL MEDICINE
Payer: MEDICARE

## 2021-12-18 DIAGNOSIS — R53.82 CHRONIC FATIGUE: ICD-10-CM

## 2021-12-18 DIAGNOSIS — F41.9 ANXIETY: ICD-10-CM

## 2021-12-18 DIAGNOSIS — R45.851 SUICIDAL IDEATION: ICD-10-CM

## 2021-12-18 DIAGNOSIS — H93.13 TINNITUS OF BOTH EARS: Primary | ICD-10-CM

## 2021-12-18 RX ORDER — ENOXAPARIN SODIUM 100 MG/ML
40 INJECTION SUBCUTANEOUS DAILY
Status: DISCONTINUED | OUTPATIENT
Start: 2021-12-19 | End: 2021-12-20

## 2021-12-18 RX ORDER — LORAZEPAM 2 MG/ML
1 INJECTION INTRAMUSCULAR 2 TIMES DAILY PRN
Status: DISCONTINUED | OUTPATIENT
Start: 2021-12-18 | End: 2021-12-20

## 2021-12-18 RX ORDER — ACETAMINOPHEN 325 MG/1
650 TABLET ORAL EVERY 6 HOURS PRN
Status: DISCONTINUED | OUTPATIENT
Start: 2021-12-18 | End: 2021-12-21

## 2021-12-18 RX ORDER — HYDROCODONE BITARTRATE AND ACETAMINOPHEN 5; 325 MG/1; MG/1
1 TABLET ORAL ONCE
Status: COMPLETED | OUTPATIENT
Start: 2021-12-18 | End: 2021-12-18

## 2021-12-18 RX ORDER — MORPHINE SULFATE 4 MG/ML
4 INJECTION, SOLUTION INTRAMUSCULAR; INTRAVENOUS ONCE
Status: COMPLETED | OUTPATIENT
Start: 2021-12-18 | End: 2021-12-18

## 2021-12-18 RX ORDER — ENOXAPARIN SODIUM 100 MG/ML
40 INJECTION SUBCUTANEOUS 2 TIMES DAILY
Status: DISCONTINUED | OUTPATIENT
Start: 2021-12-19 | End: 2021-12-18

## 2021-12-19 PROCEDURE — 99214 OFFICE O/P EST MOD 30 MIN: CPT | Performed by: OTHER

## 2021-12-19 PROCEDURE — 99219 INITIAL OBSERVATION CARE,LEVL II: CPT | Performed by: INTERNAL MEDICINE

## 2021-12-19 RX ORDER — HYDROCODONE BITARTRATE AND ACETAMINOPHEN 5; 325 MG/1; MG/1
1 TABLET ORAL ONCE
Status: COMPLETED | OUTPATIENT
Start: 2021-12-19 | End: 2021-12-20

## 2021-12-19 RX ORDER — PAROXETINE HYDROCHLORIDE 20 MG/1
10 TABLET, FILM COATED ORAL NIGHTLY
Status: DISCONTINUED | OUTPATIENT
Start: 2021-12-19 | End: 2021-12-20

## 2021-12-19 RX ORDER — TRAMADOL HYDROCHLORIDE 50 MG/1
50 TABLET ORAL ONCE
Status: DISCONTINUED | OUTPATIENT
Start: 2021-12-19 | End: 2021-12-21

## 2021-12-19 RX ORDER — ATORVASTATIN CALCIUM 20 MG/1
20 TABLET, FILM COATED ORAL NIGHTLY
Status: DISCONTINUED | OUTPATIENT
Start: 2021-12-19 | End: 2021-12-21

## 2021-12-19 NOTE — ED INITIAL ASSESSMENT (HPI)
Patient complains of ringing in the ears and dizziness starting four months ago. Patient reports feeling shakey. Right knee pain.

## 2021-12-19 NOTE — ED NOTES
This writer met with the patient to explain plan of care, admission process and discuss mental health rights. Patient agreed to voluntary behavioral health treatment. A copy of all documents signed was provided.  A copy was scanned to EMR and originals were

## 2021-12-19 NOTE — PLAN OF CARE
Admitted from ED. 1-to-1 Observer/Sitter at bedside in place at all times during shift. Patient reports \"severe anxiety\" d/t not sleeping, constant tinnitus and difficulty seeing because of cataracts.  Reports that he is not able to participate in [facility] support with 1:1 interaction with staff  Outcome: Progressing     Problem: COPING  Goal: Pt/Family able to verbalize concerns and demonstrate effective coping strategies  Description: INTERVENTIONS:  - Assist patient/family to identify coping skills, avail

## 2021-12-19 NOTE — CONSULTS
[facility]    PATIENT'S NAME: Cynthia Balderrama   ATTENDING PHYSICIAN: Kathryn Ornelas MD   CONSULTING PHYSICIAN: Lindy Green MD   PATIENT ACCOUNT#:   [de-identified]    LOCATION:  [address] [place] [address] #:   [id number]       DATE OF had withdrawal symptoms after he was discharged including sweats that occurred after about 1 month. He said they were giving him clonazepam for the month after discharge.   When he had withdrawal symptoms, he called the inpatient psychiatrist who said that wall.    His appetite has been poor. He has lost 30 pounds since August of this year. He has lost 10 to 15 pounds in the last couple of months. Part of this was related to an episode of diverticulitis that he had on November 5.   That has been the time p made him have more tinnitus except the Paxil 40 mg that he was on.   This was also suddenly stopped at [facility].    The patient also notes he has had polyuria and he thinks this is because of an enlarged prostate, and he urinates only small amounts many hours on the phone and in person trying to get some legal help to prevent this from happening, and he was not able to get anybody in [place] in the legal system to help him prevent his mother from being taken advantage of in this way.     FAMILY HISTOR Deferred. AXIS III:  Hyperlipidemia, severe tinnitus, hearing loss, Fuchs' dystrophy of the eyes, recent history of diverticulitis. AXIS IV:  Severe, multiple medical and psychiatric problems. AXIS V:  Current 21, past year 55.     INITIAL TREATMENT RE

## 2021-12-19 NOTE — CONSULTS
[facility]  [address] [facility], [address] [place] [address]  [phone]          Rashmi NOTE       [facility]    Report of Consultation    Toñito Pimentel Patient Status:  Emmy Fothergill fall resulting in contusional injuries and subdural and subarachnoid hemorrhages. Parkinson disease in his family. Episode was not associated with tongue biting, or bowel or bladder incontinence. He has severe cataracts.   Symptoms feels like his hands Comment: 4-5 shots/day      Drug use: No    Other Topics      Concerns:        Caffeine Concern: Yes          1 cup of coffee 3 times per week      FAMILY HISTORY  Family History   Problem Relation Age of Onset   • Hypertension Maternal Grandmother    • Ot 12/19/2021     12/19/2021    K 4.7 12/19/2021     12/19/2021    CO2 27.0 12/19/2021    GLU 63 12/19/2021    CA 10.1 12/19/2021       HGBA1C:  No results found for: A1C, EAG             IMAGING:  CT brain 11/2021  CONCLUSION:       Mild chronic

## 2021-12-19 NOTE — ED PROVIDER NOTES
Patient Seen in: [facility] Emergency Department      History   Patient presents with:  Dizziness  Tinnitus    Stated Complaint: tinnitus/difficulty hearing/feeling shaky/dizziness    Subjective:   HPI    80year old male with multiple medical iss 3/28/2019   • Tinnitus    • Tinnitus 11/25/2016              Past Surgical History:   Procedure Laterality Date   • COLONOSCOPY  05/2017    polyps.  repeat 2020   • EGD  05/2017   • KNEE REPLACEMENT SURGERY     • TOTAL KNEE REPLACEMENT  2005, 2008     Has h Musculoskeletal:         General: No tenderness. Normal range of motion. Cervical back: Full passive range of motion without pain, normal range of motion and neck supple. No rigidity. Normal range of motion.    Skin:     General: Skin is warm and dry administration in time range)   enoxaparin (LOVENOX) 40 MG/0.4ML injection 40 mg (has no administration in time range)   acetaminophen (TYLENOL) tab 650 mg (has no administration in time range)   morphINE sulfate (PF) 4 MG/ML injection 4 mg (4 mg Intraveno

## 2021-12-19 NOTE — H&P
[facility] HOSP - [facility]    History and Physical    [facility] Patient Status:  Observation    3/5/1950 MRN [id number]   Location [facility] 4W/SW/SE Attending Gail Beauchamp MD   Hosp Day # 0 PCP Vandana Alexander MD     Date:  20 fasting glucose) 11/25/2016   • Need for vaccination 11/18/2019   • Physical exam, annual 11/6/2017   • Right upper quadrant abdominal pain 12/8/2016   • Screening for colorectal cancer 3/28/2019   • Tinnitus    • Tinnitus 11/25/2016     Past Surgical Hist height 5' 8\" (1.727 m), weight 155 lb (70.3 kg), SpO2 95 %. Nursing note and vitals reviewed. Constitutional: He is oriented to person, place, and time. He appears well-developed and well-nourished. HENT:   Head: Normocephalic and atraumatic.    Car discussed with his wife          Home Mark MD  12/19/2021

## 2021-12-19 NOTE — PLAN OF CARE
Plan for MRI of the brain to evaluate tinnitis, as well as EEG. Psych is recommending inpatient psychiatric hospitalization. Arranging with psych liaison for transfer. Starting on Paxil tonight. Ativan helping with anxiety and sleep.  Sitter remains in plac Progressing  Goal: Patient/Family Short Term Goal  Description: Patient's Short Term Goal:     Interventions:   -   - See additional Care Plan goals for specific interventions  Outcome: Progressing     Problem: ANXIETY  Goal: Will report anxiety at [facility].

## 2021-12-19 NOTE — ED QUICK NOTES
Orders for admission, patient is aware of plan and ready to go upstairs.  Any questions, please call ED Rjim *  at extension [phone]   Type of COVID test sent: rapid  COVID Suspicion level: Low    Titratable drug(s) infusing:none  Rate:    LOC at time of jackson

## 2021-12-19 NOTE — PROGRESS NOTES
12/19/21 1522   Clinical Encounter Type   Visited With Patient; Family  (talked to wife in hallway as she was leaving)   Routine Visit Introduction  (Consult)   Patient Spiritual Encounters   Spiritual Assessment Completed Yes   Spiritual Needs Pt said h

## 2021-12-20 ENCOUNTER — APPOINTMENT (OUTPATIENT)
Dept: MRI IMAGING | Facility: HOSPITAL | Age: 71
End: 2021-12-20
Attending: Other
Payer: MEDICARE

## 2021-12-20 PROBLEM — F45.0 ANXIETY WITH SOMATIZATION: Status: ACTIVE | Noted: 2021-12-18

## 2021-12-20 PROBLEM — F41.9 ANXIETY WITH SOMATIZATION: Status: ACTIVE | Noted: 2021-12-18

## 2021-12-20 PROBLEM — F60.3 BORDERLINE PERSONALITY DISORDER (HCC): Status: ACTIVE | Noted: 2021-12-20

## 2021-12-20 PROBLEM — F33.2 SEVERE EPISODE OF RECURRENT MAJOR DEPRESSIVE DISORDER, WITHOUT PSYCHOTIC FEATURES (HCC): Status: ACTIVE | Noted: 2021-12-20

## 2021-12-20 PROCEDURE — 99226 SUBSEQUENT OBSERVATION CARE: CPT | Performed by: OTHER

## 2021-12-20 PROCEDURE — 95816 EEG AWAKE AND DROWSY: CPT | Performed by: OTHER

## 2021-12-20 PROCEDURE — 99212 OFFICE O/P EST SF 10 MIN: CPT | Performed by: OTHER

## 2021-12-20 PROCEDURE — 99225 SUBSEQUENT OBSERVATION CARE: CPT | Performed by: INTERNAL MEDICINE

## 2021-12-20 PROCEDURE — 70553 MRI BRAIN STEM W/O & W/DYE: CPT | Performed by: OTHER

## 2021-12-20 RX ORDER — CALCIUM CARBONATE 200(500)MG
1000 TABLET,CHEWABLE ORAL 4 TIMES DAILY PRN
Status: DISCONTINUED | OUTPATIENT
Start: 2021-12-20 | End: 2021-12-20

## 2021-12-20 RX ORDER — ARIPIPRAZOLE 2 MG/1
2 TABLET ORAL NIGHTLY
Status: DISCONTINUED | OUTPATIENT
Start: 2021-12-20 | End: 2021-12-21

## 2021-12-20 RX ORDER — LORAZEPAM 1 MG/1
1 TABLET ORAL EVERY 6 HOURS PRN
Status: DISCONTINUED | OUTPATIENT
Start: 2021-12-20 | End: 2021-12-21

## 2021-12-20 RX ORDER — HEPARIN SODIUM 5000 [USP'U]/ML
5000 INJECTION, SOLUTION INTRAVENOUS; SUBCUTANEOUS EVERY 8 HOURS SCHEDULED
Status: DISCONTINUED | OUTPATIENT
Start: 2021-12-20 | End: 2021-12-20

## 2021-12-20 RX ORDER — CALCIUM CARBONATE 200(500)MG
2 TABLET,CHEWABLE ORAL AS NEEDED
COMMUNITY

## 2021-12-20 RX ORDER — HEPARIN SODIUM 5000 [USP'U]/ML
5000 INJECTION, SOLUTION INTRAVENOUS; SUBCUTANEOUS EVERY 8 HOURS SCHEDULED
Status: DISCONTINUED | OUTPATIENT
Start: 2021-12-20 | End: 2021-12-21

## 2021-12-20 RX ORDER — MIRTAZAPINE 15 MG/1
15 TABLET, FILM COATED ORAL NIGHTLY
Status: DISCONTINUED | OUTPATIENT
Start: 2021-12-20 | End: 2021-12-21

## 2021-12-20 RX ORDER — CALCIUM CARBONATE 200(500)MG
1000 TABLET,CHEWABLE ORAL 3 TIMES DAILY PRN
Status: DISCONTINUED | OUTPATIENT
Start: 2021-12-20 | End: 2021-12-20

## 2021-12-20 RX ORDER — CALCIUM CARBONATE 200(500)MG
500 TABLET,CHEWABLE ORAL 3 TIMES DAILY PRN
Status: DISCONTINUED | OUTPATIENT
Start: 2021-12-20 | End: 2021-12-21

## 2021-12-20 NOTE — BH PROGRESS NOTE
12/21/2021 at [id number]: Psych Lianancy worked with [facility] Transfer RN Stacy Garcia to facilitate bed request and acceptance to [facility] under Dr. Nathan Gonzalez.  Psych Liaison received update from Yessi Montgomery stating that Juve Alford could be transferred at any time per RN to [employer]

## 2021-12-20 NOTE — PROCEDURES
EEG report    REFERRING PHYSICIAN: Jennifer Johnson MD    PCP and phone number:  Flavio Lott MD  [phone]    TECHNIQUE: 21 channels of EEG, 2 channels of EOG, and 1 channel of EKG were recorded utilizing the International 10/20 System.  The recordin

## 2021-12-20 NOTE — PLAN OF CARE
Pt requested increase in ativan. Increased per Dr. Bruno Pavon. Pt appears more comfortable. Sitter at bedside. MRI and EEG completed. Plan transfer tomorrow.      Problem: Risk for Violence-Violent Restraints/Seclusion  Goal: Patient will not express any viole SELF HARM  Goal: Patient will be protected from self-harm  Description: INTERVENTIONS:  - Initiate Suicide Precautions, including constant direct observation by a care companion, sitter or RN  - Initiate consults as appropriate with Tran Campo, Spir

## 2021-12-20 NOTE — PLAN OF CARE
A/ox4, on RA, sitter in place, refused Paxil, received PRN IV Ativan, complains of severe head pain related to tinnitus, notified MD, received 1x order for Tramadol patient refused, notified MD of refusal, received 1x order for PO [place], reassessed patient specific interventions  Outcome: Progressing  Goal: Patient/Family Short Term Goal  Description: Patient's Short Term Goal:     Interventions:   -   - See additional Care Plan goals for specific interventions  Outcome: Progressing     Problem: [address]

## 2021-12-20 NOTE — PROGRESS NOTES
Katherin [facility]  [address] [facility], [address] [place] [address]  [phone]          INPATIENT NEUROLOGY   FOLLOW UP [facility]     Report of Consultation    Marilee Roland Patient Status:  Rodney Ngo process.    2.  Scattered T2/FLAIR signal hyperintense changes throughout the cerebral white matter and within the elizabeth/midbrain.  No corresponding diffusion restriction or enhancement.  Findings most likely represent microvascular white matter ischemic nima need for new neurological medications. Please call us with further questions. This note was prepared using Syrmo voice recognition dictation software and as a result, errors may occur. When identified, these errors have been corrected.  While

## 2021-12-21 VITALS
WEIGHT: 155 LBS | HEIGHT: 68 IN | OXYGEN SATURATION: 98 % | SYSTOLIC BLOOD PRESSURE: 112 MMHG | RESPIRATION RATE: 18 BRPM | DIASTOLIC BLOOD PRESSURE: 74 MMHG | TEMPERATURE: 98 F | BODY MASS INDEX: 23.49 KG/M2 | HEART RATE: 100 BPM

## 2021-12-21 PROBLEM — F32.2 SEVERE MAJOR DEPRESSION (HCC): Status: ACTIVE | Noted: 2021-12-21

## 2021-12-21 PROCEDURE — 99226 SUBSEQUENT OBSERVATION CARE: CPT | Performed by: OTHER

## 2021-12-21 PROCEDURE — 99217 OBSERVATION CARE DISCHARGE: CPT | Performed by: INTERNAL MEDICINE

## 2021-12-21 RX ORDER — MIRTAZAPINE 15 MG/1
15 TABLET, FILM COATED ORAL NIGHTLY
Qty: 30 TABLET | Refills: 0 | Status: ON HOLD | OUTPATIENT
Start: 2021-12-21 | End: 2021-12-24

## 2021-12-21 RX ORDER — ARIPIPRAZOLE 2 MG/1
2 TABLET ORAL NIGHTLY
Qty: 30 TABLET | Refills: 0 | Status: SHIPPED | OUTPATIENT
Start: 2021-12-21 | End: 2021-12-24

## 2021-12-21 NOTE — PROGRESS NOTES
Jayce Issa is a 80year old   male presented to the hospital with increased anxiety, restlessness, hopelessness and reporting suicidal ideation. Patient put on certificate and petition.     I spent a total of 35 minutes with the meryl reporting \"I can feel all my body shaking now\" when the patient did not appear in any tremor or shake.     The patient otherwise accidentally talking about he was in [address] in the past and [place] for rehab and he feel that the damage him with the s Nightly  ARIPiprazole (ABILIFY) tab 2 mg, 2 mg, Oral, Nightly  LORazepam (ATIVAN) tab 1 mg, 1 mg, Oral, Q6H PRN  calcium carbonate antacid (TUMS) chewable tab 500 mg, 500 mg, Oral, TID PRN  atorvastatin (LIPITOR) tab 20 mg, 20 mg, Oral, Nightly  traMADol ( tremor. The patient spoke in a clear his speech with no dysarthria. The patient demonstrated superficial cooperative behavior otherwise rationalizing his lack of compliant and blaming the hospital and previous to treatment for not doing well.   The patien nightly to improve anxiety, insomnia and depression. 5.  Start Abilify 2 mg in the evening to filtered negative thoughts and improve mood. 6.  Communicate with neurologist to explain the patient symmetrization with no need for his EEG.     7.  Coordinate

## 2021-12-21 NOTE — BH PROGRESS NOTE
Pt has been accepted to [facility] bed 911Y by Dr. Farheen Villarreal.  Writer discussed case with RN Supervisor Latrell Rhodes, and it was decided that pt should have a medical bed due to his recent hx of weakness, rolling around in bed and falling out of bed at home and needing b

## 2021-12-21 NOTE — PROGRESS NOTES
[facility]    Progress Note    Andrea Vazquez Patient Status:  Observation    3/5/1950 MRN [id number]   Location [facility] 4W/SW/SE Attending Moon Sanchez MD   Hosp Day # 0 PCP Kathryn Ornelas MD     Subjective:   Eboni Berrios T2/FLAIR signal hyperintense changes throughout the cerebral white matter and within the elizabeth/midbrain. No corresponding diffusion restriction or enhancement. Findings most likely represent microvascular white matter ischemic change.  3.  Old/chronic left

## 2021-12-21 NOTE — PLAN OF CARE
A/O x 4. Sitter at bedside at all times for patient safety. Ambulating SBA. Ativan per pt request, states Jodi Median is the only thing that helps the tinnitus\".  Bed in lowest position, call light in reach, fall precautions in place, 24hr supervision for pat

## 2021-12-21 NOTE — PLAN OF CARE
Problem: Safety Risk   Goal: Patient will remain free from self-harm  Description: INTERVENTIONS:  - Assess for any contributing factors to confusion (electrolyte disturbances, delirium, medications)  - Discontinue any unnecessary medical devices as soon a

## 2021-12-21 NOTE — CERTIFICATION
Ref: [address] [facility] 5/3-403, 5/3-602, 5/3-607, 5/3-610    5/3-702, 5/3-813, 5/4-306, 5/4-402, 5/4-403    5/4-405, 5/4-501, 5/4-611, 4/6-459   Inpatient Certificate  Re: Ayaka Peck    (name)     I personally informed the above-named individual of the pu her behavioral history, to suffer mental or emotional deterioration and is reasonably expected, after such deterioration, to meet the criteria of either paragraph one or paragraph two above;   []  An individual who is developmentally disabled and unless tr

## 2021-12-21 NOTE — PLAN OF CARE
A/ox4, up x1 SBA, sitter in place, tolerating diet, started on Abilify and Remeron, complains of worsening tinnitus, received PRN Ativan for restlessness/anxiety, patient able to sleep 2 hours after given Ativan.  Call light within reach, sitter in place, s Precautions, including constant direct observation by a care companion, sitter or RN  - Initiate consults as appropriate with Behavioral Health, Spiritual Care  - Evaluate care setting prior to admitting patient removing all contraband  - Remove all person

## 2021-12-21 NOTE — PROGRESS NOTES
Eli Ugalde is a 80year old   male presented to the hospital with increased anxiety, restlessness, hopelessness and reporting suicidal ideation. Patient put on certificate and petition.     I spent a total of 35 minutes with the meryl 12/30/2020   • Brain bleed [facility])    • Colon polyp 05/2017   • Head trauma 4/12/2021   • Hearing loss    • High cholesterol    • Hyperlipidemia    • IFG (impaired fasting glucose) 11/25/2016   • Need for vaccination 11/18/2019   • Physical exam, annual 11/6 (L) 12/19/2021    CA 10.1 12/19/2021    ALB 3.8 11/04/2021    ALKPHO 92 11/04/2021    TP 7.6 11/04/2021    AST 24 11/04/2021    ALT 44 11/04/2021    PTT 26.8 10/29/2020    INR 1.06 10/29/2020    PTP 13.6 10/29/2020    TSH 2.690 11/23/2021    LIP 99 11/04/2 hallucination. Patient demonstrating appropriate cognitive function.   Intellectual function is average good  Judgment and insight are impaired as an evidenced by the patient lack of compliant and projecting negativity on other as a cause of his depression

## 2021-12-21 NOTE — PROGRESS NOTES
12/21/21 0931   COVID Exposure Risk Screening   Have you been practicing social distancing? Yes   Have you been wearing a mask when in the community? Yes   Are the people you live with following social distancing and wearing a mask?  Yes  (from home with

## 2021-12-22 PROBLEM — F33.2 SEVERE RECURRENT MAJOR DEPRESSION WITHOUT PSYCHOTIC FEATURES (HCC): Status: ACTIVE | Noted: 2021-12-20

## 2021-12-22 PROBLEM — F32.2 SEVERE MAJOR DEPRESSION (HCC): Status: RESOLVED | Noted: 2021-12-21 | Resolved: 2021-12-22

## 2021-12-22 PROBLEM — F33.2 SEVERE RECURRENT MAJOR DEPRESSION WITHOUT PSYCHOTIC FEATURES (HCC): Chronic | Status: ACTIVE | Noted: 2021-12-20

## 2021-12-23 PROBLEM — F60.3 BORDERLINE PERSONALITY DISORDER (HCC): Status: RESOLVED | Noted: 2021-12-20 | Resolved: 2021-12-23

## 2021-12-23 PROBLEM — R45.851 SUICIDAL IDEATION: Status: RESOLVED | Noted: 2021-12-18 | Resolved: 2021-12-23

## 2021-12-23 PROBLEM — F45.0 ANXIETY WITH SOMATIZATION: Status: RESOLVED | Noted: 2021-12-18 | Resolved: 2021-12-23

## 2021-12-23 PROBLEM — F41.9 ANXIETY WITH SOMATIZATION: Status: RESOLVED | Noted: 2021-12-18 | Resolved: 2021-12-23

## 2021-12-23 NOTE — DISCHARGE SUMMARY
[facility] - [facility]    Discharge Summary    Carlos Solis Patient Status:  Observation    3/5/1950 MRN [id number]   Location [facility] 4W/SW/SE Attending No att. providers found   Hosp Day # 0 PCP Joseph Mariano MD     Date of Admiss asked him if he would kill himself if he did have access to a plan and he said yes. Pt and wife state his tinnitus is debilitating, usually made worse by most medications. Suicidal ideation-appreciate psych consultation. Sitter at bedside.   Plan for nondistended. Positive bowel sounds. No rebound tenderness  Neurologic: No focal neurological deficits. Musculoskeletal: Motor strength 5/5  Integument: No lesions. No erythema. Psychiatric: Appropriate mood and affect.       Complications: None    Consu minutes, more than 50% of the time was spend on counseling and coordination of care.     Flavio Lott MD  12/23/2021

## 2022-01-07 RX ORDER — TEMAZEPAM 7.5 MG/1
7.5 CAPSULE ORAL NIGHTLY PRN
Qty: 30 CAPSULE | Refills: 0 | Status: SHIPPED | OUTPATIENT
Start: 2022-01-07

## 2022-01-07 NOTE — TELEPHONE ENCOUNTER
Patients wife called to request refill for  temazepam 7.5 MG Oral Cap   She states that patient is out of medication.    Requesting call back

## 2022-01-07 NOTE — TELEPHONE ENCOUNTER
RN called patient's wife. Patient's date of birth and full name both confirmed. RN informed patient of provider's message below. She verbalizes understanding and is agreeable to instructions. And states patient has appt with psychiatrist next Friday.

## 2022-01-07 NOTE — TELEPHONE ENCOUNTER
I have refilled this medication.   He should be getting this refill from his psychiatrist.  Please inform him thank you

## 2022-01-17 ENCOUNTER — TELEMEDICINE (OUTPATIENT)
Dept: INTERNAL MEDICINE CLINIC | Facility: CLINIC | Age: 72
End: 2022-01-17

## 2022-01-17 DIAGNOSIS — R05.9 COUGH: ICD-10-CM

## 2022-01-17 DIAGNOSIS — G47.00 INSOMNIA, UNSPECIFIED TYPE: ICD-10-CM

## 2022-01-17 DIAGNOSIS — Z91.018 FOOD ALLERGY: ICD-10-CM

## 2022-01-17 DIAGNOSIS — Z91.048 OTHER NONMEDICINAL SUBSTANCE ALLERGY STATUS: ICD-10-CM

## 2022-01-17 DIAGNOSIS — L29.9 PRURITUS: Primary | ICD-10-CM

## 2022-01-17 PROCEDURE — 99214 OFFICE O/P EST MOD 30 MIN: CPT | Performed by: INTERNAL MEDICINE

## 2022-01-23 NOTE — PROGRESS NOTES
Subjective:   Patient ID: Latisha Dela Cruz is a 80year old male.     HPI    Patient seen today through [facility] video visit is complaining today of ongoing itchiness as per patient been going on since October he has tried changes detergent shampoo Take 1 tablet (20 mg total) by mouth nightly. 90 tablet 1     Allergies:No Known Allergies    Objective:   Physical Exam  Constitutional:       Appearance: He is not ill-appearing. Pulmonary:      Effort: No respiratory distress.    Neurological:      Men

## 2022-01-25 ENCOUNTER — OFFICE VISIT (OUTPATIENT)
Dept: OTOLARYNGOLOGY | Facility: CLINIC | Age: 72
End: 2022-01-25
Payer: MEDICARE

## 2022-01-25 ENCOUNTER — OFFICE VISIT (OUTPATIENT)
Dept: AUDIOLOGY | Facility: CLINIC | Age: 72
End: 2022-01-25
Payer: MEDICARE

## 2022-01-25 VITALS — WEIGHT: 155 LBS | HEIGHT: 68 IN | BODY MASS INDEX: 23.49 KG/M2

## 2022-01-25 DIAGNOSIS — H90.3 SENSORINEURAL HEARING LOSS, BILATERAL: Primary | ICD-10-CM

## 2022-01-25 DIAGNOSIS — H90.3 SENSORINEURAL HEARING LOSS (SNHL) OF BOTH EARS: Primary | ICD-10-CM

## 2022-01-25 DIAGNOSIS — H61.23 BILATERAL IMPACTED CERUMEN: ICD-10-CM

## 2022-01-25 PROCEDURE — 92557 COMPREHENSIVE HEARING TEST: CPT | Performed by: AUDIOLOGIST

## 2022-01-25 PROCEDURE — G0268 REMOVAL OF IMPACTED WAX MD: HCPCS | Performed by: OTOLARYNGOLOGY

## 2022-01-25 PROCEDURE — 99213 OFFICE O/P EST LOW 20 MIN: CPT | Performed by: OTOLARYNGOLOGY

## 2022-01-25 PROCEDURE — 92567 TYMPANOMETRY: CPT | Performed by: AUDIOLOGIST

## 2022-01-25 NOTE — PROGRESS NOTES
Vinita Spencer is a 80year old male. Patient presents with:  Ear Problem: Pt feels like he has an ear infection, Pt also feels like there is liquid in both ears . Pt says after he took antibiotics in 11/21 ears havent felt the same.        HISTORY OF P • Physical exam, annual 11/6/2017   • Right upper quadrant abdominal pain 12/8/2016   • Screening for colorectal cancer 3/28/2019   • Tinnitus 11/25/2016       Past Surgical History:   Procedure Laterality Date   • COLONOSCOPY  05/2017    polyps.  repeat bilaterally   Skin Normal Inspection - Normal.        Lymph Detail Normal Submental. Submandibular. Anterior cervical. Posterior cervical. Supraclavicular. TMJ   tender to palpation on the right and left.    Nose/Mouth/Throat Normal External nose - Normal guard.  Would not use any medications due to his sensitivity and how medications typically worsen his tinnitus.   Hopefully he will have some improvement in his tinnitus and hyperacusis over time and he will return to see me for routine ear cleanings

## 2022-01-25 NOTE — PATIENT INSTRUCTIONS
How Hearing Aids Can Help You    Losing your hearing can be frustrating. But hearing aids can help you hear what Dami Schroeder been missing. Not everyone who has hearing loss needs hearing aids.  Hearing aids will most likely help you if your hearing loss:   · [facility] 4037. All rights reserved. This information is not intended as a substitute for professional medical care. Always follow your healthcare professional's instructions.

## 2022-02-08 ENCOUNTER — HOSPITAL ENCOUNTER (OUTPATIENT)
Dept: GENERAL RADIOLOGY | Facility: HOSPITAL | Age: 72
Discharge: HOME | End: 2022-02-08
Attending: INTERNAL MEDICINE
Payer: MEDICARE

## 2022-02-08 ENCOUNTER — OFFICE VISIT (OUTPATIENT)
Dept: INTERNAL MEDICINE CLINIC | Facility: CLINIC | Age: 72
End: 2022-02-08
Payer: MEDICARE

## 2022-02-08 VITALS
DIASTOLIC BLOOD PRESSURE: 60 MMHG | HEART RATE: 86 BPM | TEMPERATURE: 97 F | OXYGEN SATURATION: 97 % | WEIGHT: 152 LBS | SYSTOLIC BLOOD PRESSURE: 110 MMHG | RESPIRATION RATE: 14 BRPM | HEIGHT: 68 IN | BODY MASS INDEX: 23.04 KG/M2

## 2022-02-08 DIAGNOSIS — E78.2 MIXED HYPERLIPIDEMIA: Primary | ICD-10-CM

## 2022-02-08 DIAGNOSIS — R53.82 CHRONIC FATIGUE: ICD-10-CM

## 2022-02-08 DIAGNOSIS — L29.9 PRURITUS: ICD-10-CM

## 2022-02-08 DIAGNOSIS — R05.9 COUGH: ICD-10-CM

## 2022-02-08 DIAGNOSIS — Z12.5 SCREENING PSA (PROSTATE SPECIFIC ANTIGEN): ICD-10-CM

## 2022-02-08 PROCEDURE — 71046 X-RAY EXAM CHEST 2 VIEWS: CPT | Performed by: INTERNAL MEDICINE

## 2022-02-08 PROCEDURE — 99214 OFFICE O/P EST MOD 30 MIN: CPT | Performed by: INTERNAL MEDICINE

## 2022-02-08 RX ORDER — MIRTAZAPINE 45 MG/1
TABLET, FILM COATED ORAL
COMMUNITY
Start: 2022-02-03 | End: 2022-02-21

## 2022-02-08 RX ORDER — FLUTICASONE FUROATE, UMECLIDINIUM BROMIDE AND VILANTEROL TRIFENATATE 100; 62.5; 25 UG/1; UG/1; UG/1
1 POWDER RESPIRATORY (INHALATION) DAILY
Qty: 1 EACH | Refills: 0 | Status: SHIPPED | OUTPATIENT
Start: 2022-02-08

## 2022-02-08 RX ORDER — ALBUTEROL SULFATE 90 UG/1
2 AEROSOL, METERED RESPIRATORY (INHALATION) EVERY 4 HOURS PRN
Qty: 1 EACH | Refills: 1 | Status: SHIPPED | OUTPATIENT
Start: 2022-02-08

## 2022-02-09 NOTE — PROGRESS NOTES
Pt would like for you to explain the part in the xray about the colon dialation and please send thru mychart.

## 2022-02-18 ENCOUNTER — LAB ENCOUNTER (OUTPATIENT)
Dept: LAB | Facility: HOSPITAL | Age: 72
End: 2022-02-18
Attending: INTERNAL MEDICINE
Payer: MEDICARE

## 2022-02-18 DIAGNOSIS — Z12.5 SCREENING PSA (PROSTATE SPECIFIC ANTIGEN): ICD-10-CM

## 2022-02-18 DIAGNOSIS — R53.82 CHRONIC FATIGUE: ICD-10-CM

## 2022-02-18 DIAGNOSIS — E78.2 MIXED HYPERLIPIDEMIA: ICD-10-CM

## 2022-02-18 LAB
ALBUMIN SERPL-MCNC: 3.8 G/DL (ref 3.4–5)
ALBUMIN/GLOB SERPL: 1.4 {RATIO} (ref 1–2)
ALP LIVER SERPL-CCNC: 76 U/L
ALT SERPL-CCNC: 32 U/L
ANION GAP SERPL CALC-SCNC: 7 MMOL/L (ref 0–18)
AST SERPL-CCNC: 17 U/L (ref 15–37)
BILIRUB SERPL-MCNC: 0.8 MG/DL (ref 0.1–2)
BUN BLD-MCNC: 12 MG/DL (ref 7–18)
BUN/CREAT SERPL: 16.2 (ref 10–20)
CALCIUM BLD-MCNC: 10.7 MG/DL (ref 8.5–10.1)
CHLORIDE SERPL-SCNC: 107 MMOL/L (ref 98–112)
CHOLEST SERPL-MCNC: 148 MG/DL (ref ?–200)
CO2 SERPL-SCNC: 30 MMOL/L (ref 21–32)
COMPLEXED PSA SERPL-MCNC: 3.63 NG/ML (ref ?–4)
CREAT BLD-MCNC: 0.74 MG/DL
DEPRECATED RDW RBC AUTO: 48.9 FL (ref 35.1–46.3)
ERYTHROCYTE [DISTWIDTH] IN BLOOD BY AUTOMATED COUNT: 13.2 % (ref 11–15)
FASTING PATIENT LIPID ANSWER: YES
FASTING STATUS PATIENT QL REPORTED: YES
GLOBULIN PLAS-MCNC: 2.7 G/DL (ref 2.8–4.4)
GLUCOSE BLD-MCNC: 82 MG/DL (ref 70–99)
HCT VFR BLD AUTO: 51.6 %
HDLC SERPL-MCNC: 55 MG/DL (ref 40–59)
HGB BLD-MCNC: 16.5 G/DL
LDLC SERPL CALC-MCNC: 78 MG/DL (ref ?–100)
MCH RBC QN AUTO: 31.7 PG (ref 26–34)
MCHC RBC AUTO-ENTMCNC: 32 G/DL (ref 31–37)
MCV RBC AUTO: 99 FL
NONHDLC SERPL-MCNC: 93 MG/DL (ref ?–130)
OSMOLALITY SERPL CALC.SUM OF ELEC: 297 MOSM/KG (ref 275–295)
PLATELET # BLD AUTO: 267 10(3)UL (ref 150–450)
POTASSIUM SERPL-SCNC: 4.3 MMOL/L (ref 3.5–5.1)
PROT SERPL-MCNC: 6.5 G/DL (ref 6.4–8.2)
RBC # BLD AUTO: 5.21 X10(6)UL
SODIUM SERPL-SCNC: 144 MMOL/L (ref 136–145)
TRIGL SERPL-MCNC: 79 MG/DL (ref 30–149)
TSI SER-ACNC: 2.02 MIU/ML (ref 0.36–3.74)
VLDLC SERPL CALC-MCNC: 12 MG/DL (ref 0–30)
WBC # BLD AUTO: 7 X10(3) UL (ref 4–11)

## 2022-02-18 PROCEDURE — 36415 COLL VENOUS BLD VENIPUNCTURE: CPT

## 2022-02-18 PROCEDURE — 80053 COMPREHEN METABOLIC PANEL: CPT

## 2022-02-18 PROCEDURE — 80061 LIPID PANEL: CPT

## 2022-02-18 PROCEDURE — 84443 ASSAY THYROID STIM HORMONE: CPT

## 2022-02-18 PROCEDURE — 85027 COMPLETE CBC AUTOMATED: CPT

## 2022-02-21 ENCOUNTER — OFFICE VISIT (OUTPATIENT)
Dept: ALLERGY | Facility: CLINIC | Age: 72
End: 2022-02-21
Payer: MEDICARE

## 2022-02-21 ENCOUNTER — NURSE ONLY (OUTPATIENT)
Dept: ALLERGY | Facility: CLINIC | Age: 72
End: 2022-02-21
Payer: MEDICARE

## 2022-02-21 VITALS
DIASTOLIC BLOOD PRESSURE: 68 MMHG | HEIGHT: 68 IN | OXYGEN SATURATION: 97 % | HEART RATE: 85 BPM | RESPIRATION RATE: 22 BRPM | WEIGHT: 152 LBS | BODY MASS INDEX: 23.04 KG/M2 | TEMPERATURE: 97 F | SYSTOLIC BLOOD PRESSURE: 113 MMHG

## 2022-02-21 DIAGNOSIS — R05.9 COUGH: ICD-10-CM

## 2022-02-21 DIAGNOSIS — J30.89 PERENNIAL ALLERGIC RHINITIS: Primary | ICD-10-CM

## 2022-02-21 DIAGNOSIS — Z92.29 COVID-19 VACCINE SERIES COMPLETED: ICD-10-CM

## 2022-02-21 DIAGNOSIS — Z23 FLU VACCINE NEED: ICD-10-CM

## 2022-02-21 DIAGNOSIS — T78.40XA ALLERGY, INITIAL ENCOUNTER: ICD-10-CM

## 2022-02-21 PROCEDURE — 99204 OFFICE O/P NEW MOD 45 MIN: CPT | Performed by: ALLERGY & IMMUNOLOGY

## 2022-02-21 PROCEDURE — 95024 IQ TESTS W/ALLERGENIC XTRCS: CPT | Performed by: ALLERGY & IMMUNOLOGY

## 2022-02-21 PROCEDURE — 90662 IIV NO PRSV INCREASED AG IM: CPT | Performed by: ALLERGY & IMMUNOLOGY

## 2022-02-21 PROCEDURE — 95004 PERQ TESTS W/ALRGNC XTRCS: CPT | Performed by: ALLERGY & IMMUNOLOGY

## 2022-02-21 PROCEDURE — G0008 ADMIN INFLUENZA VIRUS VAC: HCPCS | Performed by: ALLERGY & IMMUNOLOGY

## 2022-02-21 RX ORDER — LEVOCETIRIZINE DIHYDROCHLORIDE 5 MG/1
5 TABLET, FILM COATED ORAL NIGHTLY
Qty: 30 TABLET | Refills: 0 | Status: SHIPPED | OUTPATIENT
Start: 2022-02-21 | End: 2022-02-21

## 2022-02-21 RX ORDER — FLUOCINOLONE ACETONIDE 0.11 MG/ML
OIL TOPICAL
Qty: 118 ML | Refills: 0 | Status: SHIPPED | OUTPATIENT
Start: 2022-02-21

## 2022-02-21 RX ORDER — LEVOCETIRIZINE DIHYDROCHLORIDE 5 MG/1
TABLET, FILM COATED ORAL
Qty: 90 TABLET | Refills: 0 | Status: SHIPPED | OUTPATIENT
Start: 2022-02-21

## 2022-02-21 NOTE — PATIENT INSTRUCTIONS
#1 cough  Handouts on cough and common etiologies provided and reviewed including postnasal drip syndrome, cough variant asthma, GERD, postinfectious cough  See above skin testing to screen for common environmental allergens  Trial of Xyzal, levocetirizine 5 g once night at bedtime as an antihistamine  May add Flonase or Nasacort 2 sprays per nostril once a day if not improving over the next 1 to 2 weeks  Using albuterol as needed. Never tried Trelegy as it was too expensive, $500  Denies wheezing chest tightness or shortness of breath    #2 allergies  See above skin testing to screen for environmental allergies. Handouts on allergic versus nonallergic rhinitis provided and reviewed  Trial of Xyzal 5 mg as an antihistamine once night at bedtime  May add Flonase or Nasacort 2 sprays per nostril once a day if refractory. May consider Singulair if not improving    #3 flu vaccine given in office today    #4 COVID vaccine up-to-date x2 doses. Last dose was October 2021.   Reviewed booster by March or April 2022

## 2022-03-18 ENCOUNTER — HOSPITAL ENCOUNTER (EMERGENCY)
Facility: HOSPITAL | Age: 72
Discharge: HOME | End: 2022-03-18
Attending: EMERGENCY MEDICINE
Payer: MEDICARE

## 2022-03-18 VITALS
HEIGHT: 69 IN | BODY MASS INDEX: 20.59 KG/M2 | HEART RATE: 90 BPM | SYSTOLIC BLOOD PRESSURE: 138 MMHG | WEIGHT: 139 LBS | TEMPERATURE: 98 F | RESPIRATION RATE: 17 BRPM | OXYGEN SATURATION: 93 % | DIASTOLIC BLOOD PRESSURE: 90 MMHG

## 2022-03-18 DIAGNOSIS — H53.9 VISUAL DISTURBANCE: Primary | ICD-10-CM

## 2022-03-18 PROCEDURE — 99283 EMERGENCY DEPT VISIT LOW MDM: CPT

## 2022-03-18 RX ORDER — TETRACAINE HYDROCHLORIDE 5 MG/ML
1 SOLUTION OPHTHALMIC ONCE
Status: COMPLETED | OUTPATIENT
Start: 2022-03-18 | End: 2022-03-18

## 2022-03-18 NOTE — ED INITIAL ASSESSMENT (HPI)
Pt presents to ED for blurred vision, halos on lights and light sensitivity that started around 6pm. Pt had cataract surgery Wednesday.

## 2022-04-11 ENCOUNTER — OFFICE VISIT (OUTPATIENT)
Dept: NEPHROLOGY | Facility: CLINIC | Age: 72
End: 2022-04-11
Payer: MEDICARE

## 2022-04-11 VITALS
DIASTOLIC BLOOD PRESSURE: 69 MMHG | BODY MASS INDEX: 21.03 KG/M2 | SYSTOLIC BLOOD PRESSURE: 116 MMHG | HEIGHT: 69 IN | WEIGHT: 142 LBS | HEART RATE: 89 BPM

## 2022-04-11 DIAGNOSIS — E83.52 HYPERCALCEMIA: Primary | ICD-10-CM

## 2022-04-11 DIAGNOSIS — R35.0 URINARY FREQUENCY: ICD-10-CM

## 2022-04-11 PROCEDURE — 99205 OFFICE O/P NEW HI 60 MIN: CPT | Performed by: INTERNAL MEDICINE
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
## 2022-04-12 ENCOUNTER — LAB ENCOUNTER (OUTPATIENT)
Dept: LAB | Facility: HOSPITAL | Age: 72
End: 2022-04-12
Attending: INTERNAL MEDICINE
Payer: MEDICARE

## 2022-04-12 DIAGNOSIS — E83.52 HYPERCALCEMIA: ICD-10-CM

## 2022-04-12 DIAGNOSIS — R35.0 URINARY FREQUENCY: ICD-10-CM

## 2022-04-12 LAB
ALBUMIN SERPL-MCNC: 3.6 G/DL (ref 3.4–5)
ANION GAP SERPL CALC-SCNC: 2 MMOL/L (ref 0–18)
BILIRUB UR QL: NEGATIVE
BUN BLD-MCNC: 16 MG/DL (ref 7–18)
BUN/CREAT SERPL: 20.5 (ref 10–20)
CALCIUM BLD-MCNC: 9.8 MG/DL (ref 8.5–10.1)
CHLORIDE SERPL-SCNC: 110 MMOL/L (ref 98–112)
CLARITY UR: CLEAR
CO2 SERPL-SCNC: 31 MMOL/L (ref 21–32)
COLOR UR: YELLOW
CREAT BLD-MCNC: 0.78 MG/DL
CREAT UR-SCNC: 272 MG/DL
GLUCOSE BLD-MCNC: 83 MG/DL (ref 70–99)
GLUCOSE UR-MCNC: NEGATIVE MG/DL
HGB UR QL STRIP.AUTO: NEGATIVE
LEUKOCYTE ESTERASE UR QL STRIP.AUTO: NEGATIVE
MICROALBUMIN UR-MCNC: 1.65 MG/DL
MICROALBUMIN/CREAT 24H UR-RTO: 6.1 UG/MG (ref ?–30)
NITRITE UR QL STRIP.AUTO: NEGATIVE
OSMOLALITY SERPL CALC.SUM OF ELEC: 296 MOSM/KG (ref 275–295)
PH UR: 5 [PH] (ref 5–8)
PHOSPHATE SERPL-MCNC: 2.3 MG/DL (ref 2.5–4.9)
POTASSIUM SERPL-SCNC: 4.4 MMOL/L (ref 3.5–5.1)
PROT UR-MCNC: 16.7 MG/DL
PROT UR-MCNC: NEGATIVE MG/DL
PTH-INTACT SERPL-MCNC: 114.7 PG/ML (ref 18.5–88)
SODIUM SERPL-SCNC: 143 MMOL/L (ref 136–145)
SP GR UR STRIP: 1.03 (ref 1–1.03)
UROBILINOGEN UR STRIP-ACNC: 2
VIT C UR-MCNC: NEGATIVE MG/DL
VIT D+METAB SERPL-MCNC: 8.9 NG/ML (ref 30–100)

## 2022-04-12 PROCEDURE — 80069 RENAL FUNCTION PANEL: CPT

## 2022-04-12 PROCEDURE — 83970 ASSAY OF PARATHORMONE: CPT

## 2022-04-12 PROCEDURE — 36415 COLL VENOUS BLD VENIPUNCTURE: CPT

## 2022-04-12 PROCEDURE — 82570 ASSAY OF URINE CREATININE: CPT

## 2022-04-12 PROCEDURE — 82542 COL CHROMOTOGRAPHY QUAL/QUAN: CPT

## 2022-04-12 PROCEDURE — 81003 URINALYSIS AUTO W/O SCOPE: CPT

## 2022-04-12 PROCEDURE — 82306 VITAMIN D 25 HYDROXY: CPT

## 2022-04-12 PROCEDURE — 84156 ASSAY OF PROTEIN URINE: CPT

## 2022-04-12 PROCEDURE — 82043 UR ALBUMIN QUANTITATIVE: CPT

## 2022-04-12 PROCEDURE — 84166 PROTEIN E-PHORESIS/URINE/CSF: CPT

## 2022-04-12 PROCEDURE — 87086 URINE CULTURE/COLONY COUNT: CPT

## 2022-04-12 PROCEDURE — 84165 PROTEIN E-PHORESIS SERUM: CPT

## 2022-04-12 PROCEDURE — 86335 IMMUNFIX E-PHORSIS/URINE/CSF: CPT

## 2022-04-14 LAB
ALBUMIN SERPL ELPH-MCNC: 4.06 G/DL (ref 3.75–5.21)
ALBUMIN/GLOB SERPL: 1.99 {RATIO} (ref 1–2)
ALPHA1 GLOB SERPL ELPH-MCNC: 0.26 G/DL (ref 0.19–0.46)
ALPHA2 GLOB SERPL ELPH-MCNC: 0.51 G/DL (ref 0.48–1.05)
B-GLOBULIN SERPL ELPH-MCNC: 0.65 G/DL (ref 0.68–1.23)
GAMMA GLOB SERPL ELPH-MCNC: 0.62 G/DL (ref 0.62–1.7)
PROT SERPL-MCNC: 6.1 G/DL (ref 6.4–8.2)

## 2022-04-16 ENCOUNTER — LAB ENCOUNTER (OUTPATIENT)
Dept: LAB | Facility: HOSPITAL | Age: 72
End: 2022-04-16
Attending: INTERNAL MEDICINE
Payer: MEDICARE

## 2022-04-16 ENCOUNTER — APPOINTMENT (OUTPATIENT)
Dept: LAB | Facility: REFERENCE LAB | Age: 72
End: 2022-04-16
Attending: INTERNAL MEDICINE
Payer: MEDICARE

## 2022-04-16 DIAGNOSIS — R35.0 URINARY FREQUENCY: ICD-10-CM

## 2022-04-16 DIAGNOSIS — E83.52 HYPERCALCEMIA: ICD-10-CM

## 2022-04-16 LAB — PROT UR-MCNC: 15.5 MG/DL

## 2022-04-16 PROCEDURE — 84166 PROTEIN E-PHORESIS/URINE/CSF: CPT

## 2022-04-16 PROCEDURE — 86335 IMMUNFIX E-PHORSIS/URINE/CSF: CPT

## 2022-04-19 LAB — PTH RELATED PEPTIDE: <2 PMOL/L

## 2022-04-22 ENCOUNTER — PATIENT MESSAGE (OUTPATIENT)
Dept: ENDOCRINOLOGY CLINIC | Facility: CLINIC | Age: 72
End: 2022-04-22

## 2022-04-25 NOTE — TELEPHONE ENCOUNTER
From: Scott Bull  To: Gladys Salinas MD  Sent: 4/22/2022 5:25 PM CDT  Subject: Need an appointment asap    I have set up an appointment with you for May 26th, but am experiencing severe itching over my body and my scalp and this has been going on for a few weeks, but it is getting more severe. Dr. Tiff Coughlin referred me to you, and if there's anyway you could see me sooner, it would be greatly appreciated.      Xander Sanchez

## 2022-04-25 NOTE — TELEPHONE ENCOUNTER
Hi!  Please let the patient know that I do not know if the itching is related to any endocrine issue without seeing him and it would be best if he brought this up with his primary care physician to see if he can evaluate this. Please also place on wait list or see if we can fit him sooner. Thank you.

## 2022-05-02 ENCOUNTER — HOSPITAL ENCOUNTER (EMERGENCY)
Facility: HOSPITAL | Age: 72
Discharge: LEFT BEFORE DISCHARGE | End: 2022-05-02
Payer: COMMERCIAL

## 2022-05-02 VITALS
SYSTOLIC BLOOD PRESSURE: 124 MMHG | HEART RATE: 82 BPM | HEIGHT: 68 IN | WEIGHT: 126 LBS | BODY MASS INDEX: 19.1 KG/M2 | RESPIRATION RATE: 16 BRPM | TEMPERATURE: 98 F | DIASTOLIC BLOOD PRESSURE: 84 MMHG | OXYGEN SATURATION: 99 %

## 2022-05-03 NOTE — ED INITIAL ASSESSMENT (HPI)
Patient to ER from home with weight loss and dehydration.  Patient states he was diagnosed with a thyroid disorder 3 weeks ago and unable to get in to see specialist.

## 2022-05-13 ENCOUNTER — TELEPHONE (OUTPATIENT)
Dept: ENDOCRINOLOGY CLINIC | Facility: CLINIC | Age: 72
End: 2022-05-13

## 2022-05-13 ENCOUNTER — OFFICE VISIT (OUTPATIENT)
Dept: ENDOCRINOLOGY CLINIC | Facility: CLINIC | Age: 72
End: 2022-05-13
Payer: MEDICARE

## 2022-05-13 DIAGNOSIS — E55.9 VITAMIN D DEFICIENCY: ICD-10-CM

## 2022-05-13 DIAGNOSIS — N25.81 SECONDARY HYPERPARATHYROIDISM (HCC): Primary | ICD-10-CM

## 2022-05-13 PROBLEM — E86.0 DEHYDRATION: Status: ACTIVE | Noted: 2022-05-13

## 2022-05-13 PROCEDURE — 99204 OFFICE O/P NEW MOD 45 MIN: CPT | Performed by: INTERNAL MEDICINE

## 2022-05-13 RX ORDER — DIFLUPREDNATE 0.5 MG/ML
EMULSION OPHTHALMIC
COMMUNITY
Start: 2022-04-18

## 2022-05-13 RX ORDER — SODIUM CHLORIDE 9 MG/ML
2000 INJECTION, SOLUTION INTRAVENOUS ONCE
Qty: 2000 ML | Refills: 0 | Status: SHIPPED | OUTPATIENT
Start: 2022-05-13 | End: 2022-05-13

## 2022-05-13 RX ORDER — ERGOCALCIFEROL (VITAMIN D2) 1250 MCG
50000 CAPSULE ORAL WEEKLY
Qty: 12 CAPSULE | Refills: 1 | Status: SHIPPED | OUTPATIENT
Start: 2022-05-13 | End: 2022-06-12

## 2022-05-13 RX ORDER — PREDNISOLONE ACETATE 10 MG/ML
SUSPENSION/ DROPS OPHTHALMIC AS NEEDED
COMMUNITY
Start: 2022-03-29

## 2022-05-13 NOTE — PATIENT INSTRUCTIONS
START Vitamin D 50,000 units once per week     Or Start Vitamin D 2000 units daily over the counter    Eucerin or Cerave Lotion    Nutritional supplemental shake - ensure or boost or premier protein every other day

## 2022-05-13 NOTE — TELEPHONE ENCOUNTER
Received signed order from Dr. Selam Starks for hydration. Faxed to infusion center. Spoke with Angelika and states they can do this and will contact the patient for scheduling. Infusion center wants to know when this should be done. Per Dr. Selam Starks can be done next week. Nakia Machado states they will contact patient.

## 2022-05-16 ENCOUNTER — TELEPHONE (OUTPATIENT)
Dept: GASTROENTEROLOGY | Facility: CLINIC | Age: 72
End: 2022-05-16

## 2022-05-16 DIAGNOSIS — R10.9 ABDOMINAL PAIN, UNSPECIFIED ABDOMINAL LOCATION: ICD-10-CM

## 2022-05-16 DIAGNOSIS — R63.4 WEIGHT LOSS: Primary | ICD-10-CM

## 2022-05-17 NOTE — TELEPHONE ENCOUNTER
Unfortunately, colonoscopy would be the best method to survey the colon based on the previous history of polyps. If the patient's overall health is declining and bowel preparation would be difficult, it would be best to hold off on a colonoscopy at this time. This can be reassessed if the patient's health improves. Alternatively I would be happy to see the patient in the office in the next several weeks to assess the patient and provide further recommendations.

## 2022-05-17 NOTE — TELEPHONE ENCOUNTER
Dr. Nilda Cordova-    Please advise. Patient's spouse, Larry Stanley returned my call to discuss TCS/scheduling of Don's overdue colonoscopy per 3 year recall. She is very concerned, and tearful upon discussion, about Don's current health decline & recently diagnosed hyperparathyroidism w/ complications. He had cataract surgery, which required him to be taking multiple different eyedrops, which she believes to have contributed to increase in digestive upset. States that patient is extremely weak right now due to health conditions, and she does not know if he is able to tolerate the exhaustive process of prepping all night for a colonoscopy. She inquires if there is any other way that Nehal Gardner can go about colorectal screening since he was due to have had one performed in 2020, but was unable to do so due to alternate health conditions/concerns. Note: Nehal Gardner is currently sleeping, and Larry Stanley makes all calls/appointments from Nehal Gardner. Requests call back to her phone number with next steps.

## 2022-05-17 NOTE — TELEPHONE ENCOUNTER
LMTCB to review TCS questions with Litzy Sandoval. CSS- If patient returns call, please transfer to GI RN . Thank you! Last Procedure, Date, MD:  5- colonoscopy w/ polypectomy & upper endoscopy with Dr. Momo Silva  Last Diagnosis:  1. Colon polyps 2. Sigmoid colon diverticulosis 3. Fair colonoscopic preparation 4. Normal upper endoscopy   Recalled for (mth/yrs): 3 years   Sedation used previously: MAC  Last Prep Used (if known): Colyte    Quality of prep (if known):fair  Anticoagulants:  Diabetic Meds:   BP meds(Ace inhibitors/ARB's):  Weight loss meds (phentermine/vyvanse):  Iron supplement (RX/OTC):  Height & Weight/BMI:  Hx of Cardiac/CVA issues/(MI/Stroke):  Devices Pacemaker/Defibrillator/Stents:  Resp. Issues/Oxygen Use/OMAR/COPD:  Issues w/Anesthesia:    Symptoms (Y/N):   Symptoms Details:     Special comments/notes:    Please advise on orders and prep, thank you.

## 2022-05-17 NOTE — TELEPHONE ENCOUNTER
Dr. Woodward Led-    Please advise. I talked to patient's spouse, Padma Coyle, with Daria Medrano present in the background of the call. Padma Coyle is frustrated and upset with the recommendations I provided to herself and Daria Medrano regarding next steps & inability to perform colonoscopy without adequate and appropriate bowel preparation. She asks if we are aware of \"how bad Daria Medrano really is right now and how poor his health is. \"    Informed her that I understand her concerns, but if Daria Medrano is not able to utilize the prescribed bowel prep, we are not going to be able to perform a colonoscopy. Offered an office visit appointment within the next several weeks, however, she and patient are insistent that he be scheduled directly to have procedure performed and that \"he is willing to do whatever is necessary for prep to have this done. \"

## 2022-05-18 ENCOUNTER — OFFICE VISIT (OUTPATIENT)
Dept: HEMATOLOGY/ONCOLOGY | Facility: HOSPITAL | Age: 72
End: 2022-05-18
Attending: INTERNAL MEDICINE
Payer: MEDICARE

## 2022-05-18 VITALS
DIASTOLIC BLOOD PRESSURE: 61 MMHG | RESPIRATION RATE: 16 BRPM | HEART RATE: 98 BPM | TEMPERATURE: 98 F | OXYGEN SATURATION: 99 % | SYSTOLIC BLOOD PRESSURE: 119 MMHG

## 2022-05-18 DIAGNOSIS — E86.0 DEHYDRATION: Primary | ICD-10-CM

## 2022-05-18 PROCEDURE — 96361 HYDRATE IV INFUSION ADD-ON: CPT

## 2022-05-18 PROCEDURE — 96360 HYDRATION IV INFUSION INIT: CPT

## 2022-05-18 NOTE — PROGRESS NOTES
Luis to infusion for 2 Liters of NACL ordered by Dr Aida Mcneil for dehydration   Zaire Armendariz arrived ambulatory with his spouse   He is not orthostatic, he reports fatigue , denies any dizziness   He does report  decreased appetite , stomach pain with eating and weight loss.    He states he has mentioned this to his MD and is hoping to go for further testing at some point to address these concerns   2 Liters given over 2 hours per order and tolerated well   PIV removed 2 x 2 and coban to site  discharged stable with spouse

## 2022-05-18 NOTE — TELEPHONE ENCOUNTER
I spoke to Nelly and to [place]. At least since November following an episode of diverticulitis Nelly has had postprandial abdominal bloating, right-sided abdominal pain, erratic bowel movements and weight loss. He has hyperparathyroidism. It is difficult to ascertain whether his symptoms are due directly or indirectly due to hyperparathyroidism, nephrolithiasis, pancreatitis, neoplasm or other abnormality. I am recommending repeat laboratory testing and a repeat CT scan of the abdomen pelvis. I would not proceed directly to a colonoscopy. Further recommendations pending these results.

## 2022-05-20 ENCOUNTER — TELEPHONE (OUTPATIENT)
Dept: GASTROENTEROLOGY | Facility: CLINIC | Age: 72
End: 2022-05-20

## 2022-05-20 NOTE — TELEPHONE ENCOUNTER
Anoop Sears states patient is unable to be scheduled fot CT Scan until 6/2/2022 - this ok? Please call. Thank you.

## 2022-05-23 NOTE — TELEPHONE ENCOUNTER
I called back and spoke with Vivian Reyes, patient's spouse. Notified her that 6/2 would be fine for Don's CT scan. Imaging not ordered to be completed STAT. Informed her that I added Delfina Stafford to wait list via apt desk to be notified should there be any cancellations or earlier available dates to complete his CT scan. Also recommended that she contact central scheduling at some point this week to inquire if any earlier availability. She was appreciative of the call back and will do so.

## 2022-05-31 ENCOUNTER — LAB ENCOUNTER (OUTPATIENT)
Dept: LAB | Facility: HOSPITAL | Age: 72
End: 2022-05-31
Attending: INTERNAL MEDICINE
Payer: MEDICARE

## 2022-05-31 DIAGNOSIS — R10.9 ABDOMINAL PAIN, UNSPECIFIED ABDOMINAL LOCATION: ICD-10-CM

## 2022-05-31 DIAGNOSIS — R63.4 WEIGHT LOSS: ICD-10-CM

## 2022-05-31 LAB
ALBUMIN SERPL-MCNC: 3.6 G/DL (ref 3.4–5)
ALBUMIN/GLOB SERPL: 1.3 {RATIO} (ref 1–2)
ALP LIVER SERPL-CCNC: 66 U/L
ALT SERPL-CCNC: 30 U/L
ANION GAP SERPL CALC-SCNC: 1 MMOL/L (ref 0–18)
AST SERPL-CCNC: 18 U/L (ref 15–37)
BASOPHILS # BLD AUTO: 0.03 X10(3) UL (ref 0–0.2)
BASOPHILS NFR BLD AUTO: 0.5 %
BILIRUB SERPL-MCNC: 0.8 MG/DL (ref 0.1–2)
BUN BLD-MCNC: 17 MG/DL (ref 7–18)
BUN/CREAT SERPL: 18.5 (ref 10–20)
CALCIUM BLD-MCNC: 9.6 MG/DL (ref 8.5–10.1)
CHLORIDE SERPL-SCNC: 109 MMOL/L (ref 98–112)
CO2 SERPL-SCNC: 33 MMOL/L (ref 21–32)
CREAT BLD-MCNC: 0.92 MG/DL
DEPRECATED RDW RBC AUTO: 49 FL (ref 35.1–46.3)
EOSINOPHIL # BLD AUTO: 0.07 X10(3) UL (ref 0–0.7)
EOSINOPHIL NFR BLD AUTO: 1.2 %
ERYTHROCYTE [DISTWIDTH] IN BLOOD BY AUTOMATED COUNT: 13.4 % (ref 11–15)
FASTING STATUS PATIENT QL REPORTED: YES
GLOBULIN PLAS-MCNC: 2.7 G/DL (ref 2.8–4.4)
GLUCOSE BLD-MCNC: 88 MG/DL (ref 70–99)
HCT VFR BLD AUTO: 49.2 %
HGB BLD-MCNC: 15.7 G/DL
IMM GRANULOCYTES # BLD AUTO: 0.02 X10(3) UL (ref 0–1)
IMM GRANULOCYTES NFR BLD: 0.3 %
LIPASE SERPL-CCNC: 92 U/L (ref 73–393)
LYMPHOCYTES # BLD AUTO: 1.51 X10(3) UL (ref 1–4)
LYMPHOCYTES NFR BLD AUTO: 25.7 %
MCH RBC QN AUTO: 31.2 PG (ref 26–34)
MCHC RBC AUTO-ENTMCNC: 31.9 G/DL (ref 31–37)
MCV RBC AUTO: 97.8 FL
MONOCYTES # BLD AUTO: 0.39 X10(3) UL (ref 0.1–1)
MONOCYTES NFR BLD AUTO: 6.6 %
NEUTROPHILS # BLD AUTO: 3.85 X10 (3) UL (ref 1.5–7.7)
NEUTROPHILS # BLD AUTO: 3.85 X10(3) UL (ref 1.5–7.7)
NEUTROPHILS NFR BLD AUTO: 65.7 %
OSMOLALITY SERPL CALC.SUM OF ELEC: 297 MOSM/KG (ref 275–295)
PLATELET # BLD AUTO: 220 10(3)UL (ref 150–450)
POTASSIUM SERPL-SCNC: 4.4 MMOL/L (ref 3.5–5.1)
PROT SERPL-MCNC: 6.3 G/DL (ref 6.4–8.2)
RBC # BLD AUTO: 5.03 X10(6)UL
SODIUM SERPL-SCNC: 143 MMOL/L (ref 136–145)
WBC # BLD AUTO: 5.9 X10(3) UL (ref 4–11)

## 2022-05-31 PROCEDURE — 83690 ASSAY OF LIPASE: CPT

## 2022-05-31 PROCEDURE — 36415 COLL VENOUS BLD VENIPUNCTURE: CPT

## 2022-05-31 PROCEDURE — 80053 COMPREHEN METABOLIC PANEL: CPT

## 2022-05-31 PROCEDURE — 85025 COMPLETE CBC W/AUTO DIFF WBC: CPT

## 2022-06-02 ENCOUNTER — HOSPITAL ENCOUNTER (OUTPATIENT)
Dept: CT IMAGING | Facility: HOSPITAL | Age: 72
Discharge: HOME | End: 2022-06-02
Attending: INTERNAL MEDICINE
Payer: MEDICARE

## 2022-06-02 DIAGNOSIS — R10.9 ABDOMINAL PAIN, UNSPECIFIED ABDOMINAL LOCATION: ICD-10-CM

## 2022-06-02 DIAGNOSIS — R63.4 WEIGHT LOSS: ICD-10-CM

## 2022-06-02 PROCEDURE — 74177 CT ABD & PELVIS W/CONTRAST: CPT | Performed by: INTERNAL MEDICINE

## 2022-06-07 ENCOUNTER — TELEPHONE (OUTPATIENT)
Dept: GASTROENTEROLOGY | Facility: CLINIC | Age: 72
End: 2022-06-07

## 2022-06-07 DIAGNOSIS — R63.4 WEIGHT LOSS: Primary | ICD-10-CM

## 2022-06-07 NOTE — TELEPHONE ENCOUNTER
Nursing: I reviewed CT in Dr. Romana Kurk absence - no acute intraabdominal processes noted. No evidence of diverticulitis or inflammation. There are changes in the esophagus c/w GERD and a small cyst in liver - none of these would cause significant abd pain or weight loss. There are findings c/w constipation which could abd bloating and discomfort but not weight loss. If pt is having constipation, increasing water/fiber intake and trial of probiotics and and MiraLAX may be helpful.  Dr. Ashok Lyles will review upon his return to office and advise further

## 2022-06-07 NOTE — TELEPHONE ENCOUNTER
Tanner Hensley-    Please review results from CT Abdomen and Pelvis completed on 6-2-22 & advise on behalf of Dr. Rajiv Don if able to do so.     Thank you

## 2022-06-08 ENCOUNTER — TELEPHONE (OUTPATIENT)
Dept: ENDOCRINOLOGY CLINIC | Facility: CLINIC | Age: 72
End: 2022-06-08

## 2022-06-08 DIAGNOSIS — E55.9 VITAMIN D DEFICIENCY: ICD-10-CM

## 2022-06-08 DIAGNOSIS — L29.9 PRURITUS: Primary | ICD-10-CM

## 2022-06-08 NOTE — TELEPHONE ENCOUNTER
I do not think the pruritis is due to his parathyroid disease - it would not be a typical symptom. He doesn't have thyroid disease, just parathyroid disease. However I was planning to recheck his labs once he was consistent with Vitamin D. Is he taking supplement? He was also going to try and get in more calories to assist weight loss. Ok to recheck labs in 2 weeks to see if levels have improved. Thanks.

## 2022-06-08 NOTE — TELEPHONE ENCOUNTER
Spoke to Kari Warner, patient's wife, she is very concerned with Erwin's current condition. Patient has been taking 83314 international units for about 3 weeks. Per wife patient has not been able to sleep and can not be fully clothed due to itching. This Rn instructed Kari Warner to have labs done in 2 weeks as stated below, Zoran's wife Kari Warner is adamant about speaking to Dr Krystle Oropeza. She  has requested a call back from Dr Krystle Oropeza when possible. Please advice. Thank you.

## 2022-06-08 NOTE — TELEPHONE ENCOUNTER
Dr. Aruna Michel to patient's wife who stated that patient has had extreme itching since last September. She sounded very upset. I had asked her if patient has been to see dermatologist and/or Dr. Anuj Lino, and she stated that patient has been to dermatologist and has seen Dr. Anuj Lino and no one has helped him. Patient's wife stated it is an internal problem and is due to the thyroid. She stated patient has tried creams, benadryl and ketoconazole shampoo and nothing has helped. She stated he was given Vitamin D supplements to help this--that also hasn't helped. She is requesting guidance from you, please advise.

## 2022-06-08 NOTE — TELEPHONE ENCOUNTER
Patient's wife is calling because patient is experiencing extreme itchiness and nothing seems to help.  Please call [phone]

## 2022-06-09 NOTE — TELEPHONE ENCOUNTER
Spoke with Sandra Palacio, patient's spouse, >15 minutes. She is tearful and very concerned about Luis's overall health and well-being. Suma Mayer is spending the majority of the day in bed due to pain, fatigue, and depression. States he was unable to eat dinner yesterday due to the severity of his abdominal discomfort. No abdominal distension or bloating. Still struggling with constipation, which he uses Dulcolax, 3-4 times per week. Constant pruritis, which she believes to be caused by Parathyroid diagnosis. Encouraged her to reach out to Dr. Anabella Espinoza in this regard. Aware that Dr. Rashad Pickard is not in office until next week. Appreciative of the call back and addressing her concerns about Suma Mayer. She will await return of Dr. Rashad Pickard next week to further address and determine next steps from GI perspective.

## 2022-06-09 NOTE — TELEPHONE ENCOUNTER
Returned call to patient's wife - she is very tearful during phone call about 's persistent symptoms. She states that we are not doing anything to help symptoms and he is only getting worst.  He has diffuse pruritis, weight loss and cold intolerance. Discussed that given severity of symptoms it is unlikely due to secondary hyperparathyroidism. She asked about thyroid although TFTs were normal in Feb.  Will Recheck TSH, FT4, PTH, Vitamin D. Discussed she could see another endocrine provider for second opinion. Or alternatively consider evaluation at an academic center since we have been unable to determine the cause for his severe symptoms.

## 2022-06-18 NOTE — TELEPHONE ENCOUNTER
I spoke to Nelly and to Toño Sweet. Don's CT scan was relatively unrevealing with the exception of a patulous distal esophagus. He is not having pain or dysphagia. We discussed uncomplicated diverticulosis and benign simple hepatic cysts. He continues to lose weight despite eating well and has generalized pruritus. I do not feel that a colonoscopy will be helpful (the patient is constipated as opposed to having diarrhea), however, I would recommend an upper endoscopy in light of the weight loss to exclude an occult lesion (based on the CT findings). GI RNs: Please contact the patient on Monday to schedule an EGD with monitored anesthesia care for a weight loss. This should be performed in the next 2 weeks.

## 2022-06-20 ENCOUNTER — LAB ENCOUNTER (OUTPATIENT)
Dept: LAB | Age: 72
End: 2022-06-20
Attending: INTERNAL MEDICINE
Payer: MEDICARE

## 2022-06-20 ENCOUNTER — TELEPHONE (OUTPATIENT)
Dept: GASTROENTEROLOGY | Facility: CLINIC | Age: 72
End: 2022-06-20

## 2022-06-20 DIAGNOSIS — Z01.818 PRE-OP TESTING: ICD-10-CM

## 2022-06-20 NOTE — TELEPHONE ENCOUNTER
GI Schedulers-    Please assist in scheduling pt for an EGD with Dr. Rashad Pickard within the next 2 weeks per orders provided below.     Dx: Weight loss  Sedation: MAC    Thank you

## 2022-06-20 NOTE — TELEPHONE ENCOUNTER
Pt wife calling back again requesting to speak to the nurse regarding instructions, medications, and to find out if he needs to take any rx Wed night before his proc on Thursday. ?

## 2022-06-20 NOTE — TELEPHONE ENCOUNTER
Scheduled for:  EGD - [address]87  Provider Name:  Dr. Terry Hernandez  Date:  6/23/22  Location:  [facility]  Sedation:  MAC  Time:  2:15 pm (pt is aware to arrive at 1:15 pm)  Prep:  NPO after midnight, Prep instructions were given to pt over the phone, pt verbalized understanding. Meds/Allergies Reconciled?:  Yes, Physician reviewed      Diagnosis with codes:  Weight loss - R63.4  Was patient informed to call insurance with codes (Y/N):  Yes, I confirmed [facility] insurance with this patient. Referral sent?:  Yes, Referral was sent at the time of electronic surgical scheduling. [facility] or [address]  notified?:  Yes, I sent an electronic request to Endo Scheduling and received a confirmation today. Medication Orders:  N/A  Misc Orders:  Patient was informed that they will need a COVID 19 test prior to their procedure. Patient verbally understood & will await a phone call from [facility] to schedule. Further instructions given by staff:  I discussed the prep instructions with the patient which he verbally understood and is aware that I will send the instructions via [employer].

## 2022-06-20 NOTE — PAT NURSING NOTE
Patient currently denies any COVID symptoms. No COVID test needed. Patient instructed to call GI office if any new COVID symptoms or contacts to Brian before procedure.

## 2022-06-20 NOTE — TELEPHONE ENCOUNTER
Prep instructions sent to patient's [employer] account. Will contact spouse tomorrow to answer any remaining questions.

## 2022-06-21 LAB — SARS-COV-2 RNA RESP QL NAA+PROBE: NOT DETECTED

## 2022-06-21 NOTE — TELEPHONE ENCOUNTER
Called and spoke with Hellen Worley, patient's spouse. States they received the prep instructions and all questions were answered after review of message. No further questions or concerns.

## 2022-06-23 ENCOUNTER — ANESTHESIA (OUTPATIENT)
Dept: ENDOSCOPY | Facility: HOSPITAL | Age: 72
End: 2022-06-23
Payer: MEDICARE

## 2022-06-23 ENCOUNTER — ANESTHESIA EVENT (OUTPATIENT)
Dept: ENDOSCOPY | Facility: HOSPITAL | Age: 72
End: 2022-06-23
Payer: MEDICARE

## 2022-06-23 ENCOUNTER — HOSPITAL ENCOUNTER (OUTPATIENT)
Facility: HOSPITAL | Age: 72
Setting detail: HOSPITAL OUTPATIENT SURGERY
Discharge: HOME | End: 2022-06-23
Attending: INTERNAL MEDICINE | Admitting: INTERNAL MEDICINE
Payer: MEDICARE

## 2022-06-23 VITALS
OXYGEN SATURATION: 97 % | SYSTOLIC BLOOD PRESSURE: 109 MMHG | DIASTOLIC BLOOD PRESSURE: 78 MMHG | RESPIRATION RATE: 16 BRPM | WEIGHT: 121 LBS | HEIGHT: 68 IN | TEMPERATURE: 98 F | HEART RATE: 56 BPM | BODY MASS INDEX: 18.34 KG/M2

## 2022-06-23 DIAGNOSIS — R63.4 WEIGHT LOSS: ICD-10-CM

## 2022-06-23 DIAGNOSIS — Z01.818 PRE-OP TESTING: Primary | ICD-10-CM

## 2022-06-23 PROCEDURE — 43239 EGD BIOPSY SINGLE/MULTIPLE: CPT | Performed by: INTERNAL MEDICINE

## 2022-06-23 PROCEDURE — 0DB48ZX EXCISION OF ESOPHAGOGASTRIC JUNCTION, VIA NATURAL OR ARTIFICIAL OPENING ENDOSCOPIC, DIAGNOSTIC: ICD-10-PCS | Performed by: INTERNAL MEDICINE

## 2022-06-23 PROCEDURE — 0DB38ZX EXCISION OF LOWER ESOPHAGUS, VIA NATURAL OR ARTIFICIAL OPENING ENDOSCOPIC, DIAGNOSTIC: ICD-10-PCS | Performed by: INTERNAL MEDICINE

## 2022-06-23 PROCEDURE — 0DB78ZX EXCISION OF STOMACH, PYLORUS, VIA NATURAL OR ARTIFICIAL OPENING ENDOSCOPIC, DIAGNOSTIC: ICD-10-PCS | Performed by: INTERNAL MEDICINE

## 2022-06-23 PROCEDURE — 0DB98ZX EXCISION OF DUODENUM, VIA NATURAL OR ARTIFICIAL OPENING ENDOSCOPIC, DIAGNOSTIC: ICD-10-PCS | Performed by: INTERNAL MEDICINE

## 2022-06-23 PROCEDURE — 0DB68ZX EXCISION OF STOMACH, VIA NATURAL OR ARTIFICIAL OPENING ENDOSCOPIC, DIAGNOSTIC: ICD-10-PCS | Performed by: INTERNAL MEDICINE

## 2022-06-23 RX ORDER — SODIUM CHLORIDE, SODIUM LACTATE, POTASSIUM CHLORIDE, CALCIUM CHLORIDE 600; 310; 30; 20 MG/100ML; MG/100ML; MG/100ML; MG/100ML
INJECTION, SOLUTION INTRAVENOUS CONTINUOUS
Status: DISCONTINUED | OUTPATIENT
Start: 2022-06-23 | End: 2022-06-23

## 2022-06-23 RX ORDER — NALOXONE HYDROCHLORIDE 0.4 MG/ML
80 INJECTION, SOLUTION INTRAMUSCULAR; INTRAVENOUS; SUBCUTANEOUS AS NEEDED
OUTPATIENT
Start: 2022-06-23 | End: 2022-06-23

## 2022-06-23 RX ORDER — SODIUM CHLORIDE, SODIUM LACTATE, POTASSIUM CHLORIDE, CALCIUM CHLORIDE 600; 310; 30; 20 MG/100ML; MG/100ML; MG/100ML; MG/100ML
INJECTION, SOLUTION INTRAVENOUS CONTINUOUS
OUTPATIENT
Start: 2022-06-23

## 2022-06-23 RX ADMIN — SODIUM CHLORIDE, SODIUM LACTATE, POTASSIUM CHLORIDE, CALCIUM CHLORIDE: 600; 310; 30; 20 INJECTION, SOLUTION INTRAVENOUS at 15:13:00

## 2022-06-23 RX ADMIN — SODIUM CHLORIDE, SODIUM LACTATE, POTASSIUM CHLORIDE, CALCIUM CHLORIDE: 600; 310; 30; 20 INJECTION, SOLUTION INTRAVENOUS at 14:57:00

## 2022-06-23 NOTE — ANESTHESIA POSTPROCEDURE EVALUATION
Patient: Mary Huff    Procedure Summary     Date: 06/23/22 Room / Location: [address] [place] [address] ENDOSCOPY 04 / [facility] ENDOSCOPY    Anesthesia Start: 3253 Anesthesia Stop: 7142    Procedure: ESOPHAGOGASTRODUODENOSCOPY (EGD) (N/A ) Diagnosis:       Weight loss      (hiatal hernia)    Surgeons: Starr Tiwari MD Anesthesiologist: Mikaela Corbett CRNA    Anesthesia Type: MAC ASA Status: 2          Anesthesia Type: No value filed.     Vitals Value Taken Time   /59 06/23/22 1514   Temp  06/23/22 1514   Pulse 80 06/23/22 1514   Resp 18 06/23/22 1514   SpO2 98 06/23/22 1514       [facility] AN Post Evaluation:   Patient Participation: complete - patient participated  Level of Consciousness: awake and alert  Pain Score: 0  Pain Management: adequate  Airway Patency:patent  Yes    Cardiovascular Status: acceptable  Respiratory Status: acceptable  Postoperative Hydration acceptable      Nai Islas CRNA  6/23/2022 3:14 PM

## 2022-06-23 NOTE — OPERATIVE REPORT
[facility] Endoscopy Report      Date of Procedure:  06/23/22        Preoperative Diagnosis:  1. Weight loss  2. Abnormal CT of the esophagus      Postoperative Diagnosis:  Small hiatal hernia      Procedure:    Esophagogastroduodenoscopy with biopsy      Surgeon:  Reilly Carroll M.D. Anesthesia:  Monitored anesthesia care  EBL:  Insignificant      Brief History: This is a 71year old male who has had postprandial abdominal bloating, abdominal discomfort and erratic bowel movements in the setting of hyperparathyroidism. He has lost a significant amount of weight. Laboratory testing and a follow-up CT scan have been negative with the exception of a patulous appearance to the distal esophagus. Upper endoscopy is being performed to evaluate. Technique:  After informed consent, the patient was placed in the left lateral recumbent position. An Olympus adult HD gastroscope was inserted into the hypopharynx and advanced under direct vision into the esophagus, stomach and duodenum. The endoscope was withdrawn and a retroflexed view of the gastric angulus, body, cardia and fundus was performed. The instrument was straightened insufflated air and fluid were suctioned and the endoscope was withdrawn. The procedure was well tolerated without immediate complication. Findings: The esophagus was normal without evidence of ulceration, erosion, stricture or ring. The esophagus was somewhat patulous. Biopsies were obtained from the GE junction and distal esophagus. The GE junction and diaphragmatic impression were at 39/40 cm with a 1 cm sliding hiatal hernia. The stomach distended appropriately with insufflated air. The mucosa of the stomach including cardia, fundus, gastric body and antrum was normal.  Biopsies from the antrum and gastric body were obtained. The duodenal bulb and post bulbar regions were normal.  Biopsies from the duodenum were obtained. Impression:  1. Small hiatal hernia  2. Otherwise normal examination of the upper digestive tract    Recommendations:  1. Follow-up biopsy results. 2.  Further recommendations pending biopsy and clinical course.       Nick Albrecht MD  6/23/2022

## 2022-07-21 ENCOUNTER — TELEPHONE (OUTPATIENT)
Dept: ENDOCRINOLOGY CLINIC | Facility: CLINIC | Age: 72
End: 2022-07-21

## 2022-07-21 NOTE — TELEPHONE ENCOUNTER
Received fax from [address] [place] [address]. Attached are the chart notes from pt's first visit. Placed on provider desk for review.

## 2022-07-28 ENCOUNTER — MED REC SCAN ONLY (OUTPATIENT)
Dept: INTERNAL MEDICINE CLINIC | Facility: CLINIC | Age: 72
End: 2022-07-28

## 2022-07-28 ENCOUNTER — OFFICE VISIT (OUTPATIENT)
Dept: OTOLARYNGOLOGY | Facility: CLINIC | Age: 72
End: 2022-07-28
Payer: MEDICARE

## 2022-07-28 VITALS — BODY MASS INDEX: 18.34 KG/M2 | TEMPERATURE: 96 F | HEIGHT: 68 IN | WEIGHT: 121 LBS

## 2022-07-28 DIAGNOSIS — H61.23 BILATERAL IMPACTED CERUMEN: Primary | ICD-10-CM

## 2022-07-28 PROCEDURE — 69210 REMOVE IMPACTED EAR WAX UNI: CPT | Performed by: OTOLARYNGOLOGY

## 2022-08-07 ENCOUNTER — HOSPITAL ENCOUNTER (OUTPATIENT)
Age: 72
Discharge: HOME | End: 2022-08-07
Attending: EMERGENCY MEDICINE
Payer: MEDICARE

## 2022-08-07 ENCOUNTER — HOSPITAL ENCOUNTER (OUTPATIENT)
Age: 72
Discharge: OTHER INSTITUTION | End: 2022-08-07
Attending: EMERGENCY MEDICINE
Payer: MEDICARE

## 2022-08-07 VITALS
RESPIRATION RATE: 20 BRPM | OXYGEN SATURATION: 98 % | TEMPERATURE: 98 F | DIASTOLIC BLOOD PRESSURE: 76 MMHG | SYSTOLIC BLOOD PRESSURE: 128 MMHG | HEART RATE: 100 BPM

## 2022-08-07 DIAGNOSIS — R53.83 FATIGUE, UNSPECIFIED TYPE: Primary | ICD-10-CM

## 2022-08-07 DIAGNOSIS — M25.50 ARTHRALGIA, UNSPECIFIED JOINT: ICD-10-CM

## 2022-08-07 LAB — SARS-COV-2 RNA RESP QL NAA+PROBE: NOT DETECTED

## 2022-08-07 PROCEDURE — 99213 OFFICE O/P EST LOW 20 MIN: CPT

## 2022-08-07 RX ORDER — CYANOCOBALAMIN (VITAMIN B-12) 1000 MCG
2 TABLET, EXTENDED RELEASE ORAL
COMMUNITY
Start: 2022-08-04 | End: 2022-08-25

## 2022-08-07 RX ORDER — CLINDAMYCIN HYDROCHLORIDE 300 MG/1
CAPSULE ORAL
COMMUNITY
Start: 2022-03-07

## 2022-08-07 RX ORDER — FLUTICASONE FUROATE, UMECLIDINIUM BROMIDE AND VILANTEROL TRIFENATATE 100; 62.5; 25 UG/1; UG/1; UG/1
1 POWDER RESPIRATORY (INHALATION) DAILY
COMMUNITY
Start: 2022-02-08

## 2022-08-07 NOTE — ED INITIAL ASSESSMENT (HPI)
Parathyroidectomy 4 days ago. Presents today with sore throat and fatigue for 2-3 days. No fever. Requests covid testing.

## 2022-08-11 ENCOUNTER — HOSPITAL ENCOUNTER (EMERGENCY)
Facility: HOSPITAL | Age: 72
Discharge: HOME | End: 2022-08-11
Attending: EMERGENCY MEDICINE
Payer: MEDICARE

## 2022-08-11 VITALS
TEMPERATURE: 98 F | HEIGHT: 68 IN | BODY MASS INDEX: 18.79 KG/M2 | RESPIRATION RATE: 20 BRPM | WEIGHT: 124 LBS | DIASTOLIC BLOOD PRESSURE: 77 MMHG | HEART RATE: 72 BPM | SYSTOLIC BLOOD PRESSURE: 118 MMHG | OXYGEN SATURATION: 98 %

## 2022-08-11 DIAGNOSIS — E86.0 DEHYDRATION: Primary | ICD-10-CM

## 2022-08-11 LAB
ANION GAP SERPL CALC-SCNC: 4 MMOL/L (ref 0–18)
BASOPHILS # BLD AUTO: 0.03 X10(3) UL (ref 0–0.2)
BASOPHILS NFR BLD AUTO: 0.6 %
BILIRUB UR QL: NEGATIVE
BUN BLD-MCNC: 20 MG/DL (ref 7–18)
BUN/CREAT SERPL: 25.3 (ref 10–20)
CALCIUM BLD-MCNC: 9.3 MG/DL (ref 8.5–10.1)
CHLORIDE SERPL-SCNC: 112 MMOL/L (ref 98–112)
CLARITY UR: CLEAR
CO2 SERPL-SCNC: 32 MMOL/L (ref 21–32)
COLOR UR: YELLOW
CREAT BLD-MCNC: 0.79 MG/DL
DEPRECATED RDW RBC AUTO: 45.2 FL (ref 35.1–46.3)
EOSINOPHIL # BLD AUTO: 0.06 X10(3) UL (ref 0–0.7)
EOSINOPHIL NFR BLD AUTO: 1.1 %
ERYTHROCYTE [DISTWIDTH] IN BLOOD BY AUTOMATED COUNT: 12.7 % (ref 11–15)
GFR SERPLBLD BASED ON 1.73 SQ M-ARVRAT: 95 ML/MIN/1.73M2 (ref 60–?)
GLUCOSE BLD-MCNC: 88 MG/DL (ref 70–99)
GLUCOSE UR-MCNC: NEGATIVE MG/DL
HCT VFR BLD AUTO: 45.9 %
HGB BLD-MCNC: 14.9 G/DL
HGB UR QL STRIP.AUTO: NEGATIVE
IMM GRANULOCYTES # BLD AUTO: 0.01 X10(3) UL (ref 0–1)
IMM GRANULOCYTES NFR BLD: 0.2 %
KETONES UR-MCNC: NEGATIVE MG/DL
LEUKOCYTE ESTERASE UR QL STRIP.AUTO: NEGATIVE
LYMPHOCYTES # BLD AUTO: 1.11 X10(3) UL (ref 1–4)
LYMPHOCYTES NFR BLD AUTO: 20.5 %
MCH RBC QN AUTO: 31.7 PG (ref 26–34)
MCHC RBC AUTO-ENTMCNC: 32.5 G/DL (ref 31–37)
MCV RBC AUTO: 97.7 FL
MONOCYTES # BLD AUTO: 0.5 X10(3) UL (ref 0.1–1)
MONOCYTES NFR BLD AUTO: 9.2 %
NEUTROPHILS # BLD AUTO: 3.7 X10 (3) UL (ref 1.5–7.7)
NEUTROPHILS # BLD AUTO: 3.7 X10(3) UL (ref 1.5–7.7)
NEUTROPHILS NFR BLD AUTO: 68.4 %
NITRITE UR QL STRIP.AUTO: NEGATIVE
OSMOLALITY SERPL CALC.SUM OF ELEC: 308 MOSM/KG (ref 275–295)
PH UR: 6.5 [PH] (ref 5–8)
PLATELET # BLD AUTO: 190 10(3)UL (ref 150–450)
POTASSIUM SERPL-SCNC: 4.8 MMOL/L (ref 3.5–5.1)
PROT UR-MCNC: NEGATIVE MG/DL
RBC # BLD AUTO: 4.7 X10(6)UL
SARS-COV-2 RNA RESP QL NAA+PROBE: NOT DETECTED
SODIUM SERPL-SCNC: 148 MMOL/L (ref 136–145)
SP GR UR STRIP: 1.02 (ref 1–1.03)
UROBILINOGEN UR STRIP-ACNC: 0.2
WBC # BLD AUTO: 5.4 X10(3) UL (ref 4–11)

## 2022-08-11 PROCEDURE — 81003 URINALYSIS AUTO W/O SCOPE: CPT | Performed by: EMERGENCY MEDICINE

## 2022-08-11 PROCEDURE — 96360 HYDRATION IV INFUSION INIT: CPT

## 2022-08-11 PROCEDURE — 96361 HYDRATE IV INFUSION ADD-ON: CPT

## 2022-08-11 PROCEDURE — 80048 BASIC METABOLIC PNL TOTAL CA: CPT | Performed by: EMERGENCY MEDICINE

## 2022-08-11 PROCEDURE — 99284 EMERGENCY DEPT VISIT MOD MDM: CPT

## 2022-08-11 PROCEDURE — 85025 COMPLETE CBC W/AUTO DIFF WBC: CPT | Performed by: EMERGENCY MEDICINE

## 2022-08-11 NOTE — ED QUICK NOTES
Patient cleared for discharge by provider. Belongings with patient. IV removed. Discharge instructions provided including when and how to follow up with health care provider and when to seek medical treatment. Patient left ER in stable condition.

## 2022-08-15 ENCOUNTER — TELEPHONE (OUTPATIENT)
Dept: ENDOCRINOLOGY CLINIC | Facility: CLINIC | Age: 72
End: 2022-08-15

## 2022-08-29 ENCOUNTER — TELEPHONE (OUTPATIENT)
Dept: GASTROENTEROLOGY | Facility: CLINIC | Age: 72
End: 2022-08-29

## 2022-08-29 NOTE — TELEPHONE ENCOUNTER
LMTCB to inquire if Pat/Luis would like to proceed in scheduling colonoscopy (last discussed on 6/24/22).

## 2022-08-29 NOTE — TELEPHONE ENCOUNTER
Dr. Rajiv Don-    Patient's spouse, Colten Woods, requesting to speak with you directly regarding patient's plan of care. Please call when able to do so to further discuss.     Thank you

## 2022-08-29 NOTE — TELEPHONE ENCOUNTER
Patients wife Padma Coyle calling to inform that patient is getting weaker and thinks he should hold off. Patients wife requesting call back from Dr. Crissy Quiroz to discuss further, please call at [phone],[facility].

## 2022-08-30 NOTE — TELEPHONE ENCOUNTER
I spoke to Toño Sweet. Nikolas Suggs was eating better and had gained some weight, however, his weight is dropping again and he has had ongoing and severe pruritus for almost 1 year of uncertain etiology. Toño Sweet was concerned that colon cancer may be the cause. This would be extremely unlikely (timeframe, recent negative CT scan and normal liver enzymes). He has been evaluated by internal medicine, nephrology, endocrinology and dermatology including having had a skin biopsy. A CPK in November 2021 was normal.  He has an appointment to see Dr. Roxane Golden this Thursday. We will await that evaluation. I do not feel that a colonoscopy will provide the answer to the pruritus and I would hold off at this time. Toño Sweet will keep me updated.

## 2022-09-01 ENCOUNTER — OFFICE VISIT (OUTPATIENT)
Dept: INTERNAL MEDICINE CLINIC | Facility: CLINIC | Age: 72
End: 2022-09-01
Payer: MEDICARE

## 2022-09-01 ENCOUNTER — LAB ENCOUNTER (OUTPATIENT)
Dept: LAB | Age: 72
End: 2022-09-01
Attending: INTERNAL MEDICINE
Payer: MEDICARE

## 2022-09-01 VITALS
SYSTOLIC BLOOD PRESSURE: 106 MMHG | WEIGHT: 128 LBS | HEART RATE: 88 BPM | BODY MASS INDEX: 19.4 KG/M2 | TEMPERATURE: 98 F | DIASTOLIC BLOOD PRESSURE: 68 MMHG | OXYGEN SATURATION: 97 % | HEIGHT: 68 IN

## 2022-09-01 DIAGNOSIS — Z00.00 PHYSICAL EXAM: Primary | ICD-10-CM

## 2022-09-01 DIAGNOSIS — Z12.5 ENCOUNTER FOR SCREENING FOR MALIGNANT NEOPLASM OF PROSTATE: ICD-10-CM

## 2022-09-01 DIAGNOSIS — K76.89 LIVER CYST: ICD-10-CM

## 2022-09-01 DIAGNOSIS — E21.3 HYPERPARATHYROIDISM (HCC): ICD-10-CM

## 2022-09-01 DIAGNOSIS — R53.83 OTHER FATIGUE: ICD-10-CM

## 2022-09-01 DIAGNOSIS — F10.21 ALCOHOL DEPENDENCE IN REMISSION (HCC): ICD-10-CM

## 2022-09-01 DIAGNOSIS — F33.2 SEVERE RECURRENT MAJOR DEPRESSION WITHOUT PSYCHOTIC FEATURES (HCC): Chronic | ICD-10-CM

## 2022-09-01 DIAGNOSIS — L29.9 PRURITUS: ICD-10-CM

## 2022-09-01 DIAGNOSIS — E78.2 MIXED HYPERLIPIDEMIA: ICD-10-CM

## 2022-09-01 DIAGNOSIS — Z00.00 PHYSICAL EXAM: ICD-10-CM

## 2022-09-01 DIAGNOSIS — D12.6 COLON ADENOMA: ICD-10-CM

## 2022-09-01 DIAGNOSIS — E83.50 UNSPECIFIED DISORDER OF CALCIUM METABOLISM: ICD-10-CM

## 2022-09-01 DIAGNOSIS — E46 PROTEIN-CALORIE MALNUTRITION, UNSPECIFIED SEVERITY (HCC): ICD-10-CM

## 2022-09-01 DIAGNOSIS — F33.2 MAJOR DEPRESSIVE DISORDER, RECURRENT SEVERE WITHOUT PSYCHOTIC FEATURES (HCC): ICD-10-CM

## 2022-09-01 PROBLEM — S06.5XAA ACUTE SUBDURAL HEMATOMA: Status: RESOLVED | Noted: 2020-10-29 | Resolved: 2022-09-01

## 2022-09-01 PROBLEM — S06.5X9A ACUTE SUBDURAL HEMATOMA (HCC): Status: RESOLVED | Noted: 2020-10-29 | Resolved: 2022-09-01

## 2022-09-01 PROBLEM — S06.5XAA ACUTE SUBDURAL HEMATOMA (HCC): Status: RESOLVED | Noted: 2020-10-29 | Resolved: 2022-09-01

## 2022-09-01 LAB
ALBUMIN SERPL-MCNC: 3.5 G/DL (ref 3.4–5)
ALBUMIN/GLOB SERPL: 1.2 {RATIO} (ref 1–2)
ALP LIVER SERPL-CCNC: 63 U/L
ALT SERPL-CCNC: 102 U/L
ANION GAP SERPL CALC-SCNC: 4 MMOL/L (ref 0–18)
AST SERPL-CCNC: 61 U/L (ref 15–37)
BASOPHILS # BLD AUTO: 0.04 X10(3) UL (ref 0–0.2)
BASOPHILS NFR BLD AUTO: 0.7 %
BILIRUB SERPL-MCNC: 0.7 MG/DL (ref 0.1–2)
BUN BLD-MCNC: 22 MG/DL (ref 7–18)
BUN/CREAT SERPL: 23.9 (ref 10–20)
CALCIUM BLD-MCNC: 9.4 MG/DL (ref 8.5–10.1)
CHLORIDE SERPL-SCNC: 110 MMOL/L (ref 98–112)
CHOLEST SERPL-MCNC: 175 MG/DL (ref ?–200)
CO2 SERPL-SCNC: 30 MMOL/L (ref 21–32)
CREAT BLD-MCNC: 0.92 MG/DL
DEPRECATED RDW RBC AUTO: 48 FL (ref 35.1–46.3)
EOSINOPHIL # BLD AUTO: 0.08 X10(3) UL (ref 0–0.7)
EOSINOPHIL NFR BLD AUTO: 1.3 %
ERYTHROCYTE [DISTWIDTH] IN BLOOD BY AUTOMATED COUNT: 13 % (ref 11–15)
EST. AVERAGE GLUCOSE BLD GHB EST-MCNC: 100 MG/DL (ref 68–126)
FASTING PATIENT LIPID ANSWER: NO
FASTING STATUS PATIENT QL REPORTED: NO
GFR SERPLBLD BASED ON 1.73 SQ M-ARVRAT: 89 ML/MIN/1.73M2 (ref 60–?)
GLOBULIN PLAS-MCNC: 2.9 G/DL (ref 2.8–4.4)
GLUCOSE BLD-MCNC: 90 MG/DL (ref 70–99)
HBA1C MFR BLD: 5.1 % (ref ?–5.7)
HCT VFR BLD AUTO: 48.7 %
HDLC SERPL-MCNC: 56 MG/DL (ref 40–59)
HGB BLD-MCNC: 15 G/DL
IMM GRANULOCYTES # BLD AUTO: 0.01 X10(3) UL (ref 0–1)
IMM GRANULOCYTES NFR BLD: 0.2 %
LDLC SERPL CALC-MCNC: 106 MG/DL (ref ?–100)
LYMPHOCYTES # BLD AUTO: 1.33 X10(3) UL (ref 1–4)
LYMPHOCYTES NFR BLD AUTO: 22 %
MCH RBC QN AUTO: 30.7 PG (ref 26–34)
MCHC RBC AUTO-ENTMCNC: 30.8 G/DL (ref 31–37)
MCV RBC AUTO: 99.6 FL
MONOCYTES # BLD AUTO: 0.46 X10(3) UL (ref 0.1–1)
MONOCYTES NFR BLD AUTO: 7.6 %
NEUTROPHILS # BLD AUTO: 4.12 X10 (3) UL (ref 1.5–7.7)
NEUTROPHILS # BLD AUTO: 4.12 X10(3) UL (ref 1.5–7.7)
NEUTROPHILS NFR BLD AUTO: 68.2 %
NONHDLC SERPL-MCNC: 119 MG/DL (ref ?–130)
OSMOLALITY SERPL CALC.SUM OF ELEC: 301 MOSM/KG (ref 275–295)
PLATELET # BLD AUTO: 230 10(3)UL (ref 150–450)
POTASSIUM SERPL-SCNC: 4.1 MMOL/L (ref 3.5–5.1)
PROT SERPL-MCNC: 6.4 G/DL (ref 6.4–8.2)
RBC # BLD AUTO: 4.89 X10(6)UL
SODIUM SERPL-SCNC: 144 MMOL/L (ref 136–145)
T3FREE SERPL-MCNC: 2.39 PG/ML (ref 2.4–4.2)
T4 FREE SERPL-MCNC: 1 NG/DL (ref 0.8–1.7)
TRIGL SERPL-MCNC: 68 MG/DL (ref 30–149)
TSI SER-ACNC: 3.84 MIU/ML (ref 0.36–3.74)
VIT D+METAB SERPL-MCNC: 87.7 NG/ML (ref 30–100)
VLDLC SERPL CALC-MCNC: 11 MG/DL (ref 0–30)
WBC # BLD AUTO: 6 X10(3) UL (ref 4–11)

## 2022-09-01 PROCEDURE — 85025 COMPLETE CBC W/AUTO DIFF WBC: CPT

## 2022-09-01 PROCEDURE — 80053 COMPREHEN METABOLIC PANEL: CPT

## 2022-09-01 PROCEDURE — 82306 VITAMIN D 25 HYDROXY: CPT

## 2022-09-01 PROCEDURE — 83036 HEMOGLOBIN GLYCOSYLATED A1C: CPT

## 2022-09-01 PROCEDURE — 84481 FREE ASSAY (FT-3): CPT

## 2022-09-01 PROCEDURE — 36415 COLL VENOUS BLD VENIPUNCTURE: CPT

## 2022-09-01 PROCEDURE — 84443 ASSAY THYROID STIM HORMONE: CPT

## 2022-09-01 PROCEDURE — 84439 ASSAY OF FREE THYROXINE: CPT

## 2022-09-01 PROCEDURE — 1125F AMNT PAIN NOTED PAIN PRSNT: CPT | Performed by: INTERNAL MEDICINE

## 2022-09-01 PROCEDURE — G0439 PPPS, SUBSEQ VISIT: HCPCS | Performed by: INTERNAL MEDICINE

## 2022-09-01 PROCEDURE — 80061 LIPID PANEL: CPT

## 2022-09-01 PROCEDURE — 99213 OFFICE O/P EST LOW 20 MIN: CPT | Performed by: INTERNAL MEDICINE

## 2022-09-01 RX ORDER — ESCITALOPRAM OXALATE 10 MG/1
10 TABLET ORAL DAILY
Qty: 30 TABLET | Refills: 6 | Status: SHIPPED | OUTPATIENT
Start: 2022-09-01 | End: 2022-09-02

## 2022-09-02 ENCOUNTER — TELEPHONE (OUTPATIENT)
Dept: INTERNAL MEDICINE CLINIC | Facility: CLINIC | Age: 72
End: 2022-09-02

## 2022-09-02 DIAGNOSIS — R79.89 LFT ELEVATION: Primary | ICD-10-CM

## 2022-09-02 RX ORDER — PAROXETINE 10 MG/1
10 TABLET, FILM COATED ORAL EVERY MORNING
Qty: 90 TABLET | Refills: 3 | Status: SHIPPED | OUTPATIENT
Start: 2022-09-02

## 2022-09-02 NOTE — TELEPHONE ENCOUNTER
Okay to change to paroxetine--I sent off prescription and discontinued escitalopram that was ordered yesterday.

## 2022-09-02 NOTE — TELEPHONE ENCOUNTER
Called and spoke with wife Alirio Chappell for clarification. Alirio Chappell stated that Escitalopram is the incorrect medication. The medication Becca Annabel was on a year ago was Paroxetine 10mg daily. Prescription for Paroxetine pended.      To Dr. Joy Reason to please advise--

## 2022-09-02 NOTE — TELEPHONE ENCOUNTER
Called and spoke with patient's wife Moreno Ayers. Relayed MD's message. Pat verbalized understanding.

## 2022-09-02 NOTE — TELEPHONE ENCOUNTER
Pt's wife called to let Rafa Mendez know that at yesterdays visit, Silva Hamm told Dr. Gregoria Portillo that the m ediation Escitalopram 10 mgs was the wrong medication. It should be Paroxetine 10 mgs. Please have Carina Gutiérrez order that today because he does not have any and he is itching all over.

## 2022-09-15 ENCOUNTER — TELEPHONE (OUTPATIENT)
Dept: INTERNAL MEDICINE CLINIC | Facility: CLINIC | Age: 72
End: 2022-09-15

## 2022-09-15 NOTE — TELEPHONE ENCOUNTER
Patient can schedule nonurgent visit for further evaluation, though I do not believe I can offer any more than the dermatologist and the allergist.  In the meantime, I would recommend that patient start taking over-the-counter Zyrtec 10 mg daily which will help prevent itching.

## 2022-09-15 NOTE — TELEPHONE ENCOUNTER
Pt itching (all over his body) for the last year  Over the last month it has gotten worse  Pt has seen Dermatology & Allergist  Thyroid surgery did not help the issue    Wife requests call back from Dr Ryne Montaño or his nurse for direction/next steps    [phone]

## 2022-09-15 NOTE — TELEPHONE ENCOUNTER
Spoke with Moreno Ayers (OK per [employer]). Relayed MD message. Pat verbalized understanding. Moreno Ayers will call back to schedule office visit.

## 2022-09-28 ENCOUNTER — HOSPITAL ENCOUNTER (OUTPATIENT)
Dept: ULTRASOUND IMAGING | Facility: HOSPITAL | Age: 72
Discharge: HOME | End: 2022-09-28
Attending: INTERNAL MEDICINE
Payer: MEDICARE

## 2022-09-28 DIAGNOSIS — K76.89 LIVER CYST: ICD-10-CM

## 2022-09-28 PROCEDURE — 76705 ECHO EXAM OF ABDOMEN: CPT | Performed by: INTERNAL MEDICINE

## 2022-10-05 ENCOUNTER — PATIENT MESSAGE (OUTPATIENT)
Dept: INTERNAL MEDICINE CLINIC | Facility: CLINIC | Age: 72
End: 2022-10-05

## 2022-10-06 NOTE — TELEPHONE ENCOUNTER
From: Shi Kaminski  To: Vu Mcgrath MD  Sent: 10/5/2022 7:15 PM CDT  Subject: What is next? I'm very relieved that my liver scan went well, but I am still in the same difficulty - itching all over and it steadily gets worse. I still have stomach pains and frequent diarrheia. I need help.     Mabel Chan

## 2022-10-12 ENCOUNTER — TELEPHONE (OUTPATIENT)
Dept: GASTROENTEROLOGY | Facility: CLINIC | Age: 72
End: 2022-10-12

## 2022-10-12 NOTE — TELEPHONE ENCOUNTER
Patient's wife is calling because the patient is trying to eat more and gain weight but when he does it is worse on his stomach with pain. Wife would like to know what can be done.

## 2022-10-12 NOTE — TELEPHONE ENCOUNTER
EGD 06/23/2022    Pt's wife states pt attempts to eat but is having difficulty. He continues to lose weight    I went over the recommendations you made on the biopsy report.      You mentioned they could contemplate a colonoscopy    She doesn't think her  could tolerate a bowel prep due to his generalized weakness    She doesn't know what to do next    She states the itching he experiences is the worst part of all of his symptoms    Wife very tearful not knowing where to turn for help for her

## 2022-10-13 NOTE — TELEPHONE ENCOUNTER
I spoke to Toño Sweet. She was quite tearful describing Don's continued symptoms of intractable tinnitus, hyperacusis, diffuse pruritus and recent eye issues post cataract surgery that required laser therapy and steroid drops which are aggravating his tinnitus. I do not believe that his symptoms are due to a primary GI cause nor that a colonoscopy will lead to a treatable cause/symptomatic improvement. I have recommended repeating the patient's liver enzymes to exclude cholestasis (mild transaminitis previously). I have suggested that they speak to Dr. Jose Rowley about a tertiary care ENT referral to manage the intractable tinnitus. Toño Sweet was appreciative of the call.

## 2022-10-20 ENCOUNTER — OFFICE VISIT (OUTPATIENT)
Dept: INTERNAL MEDICINE CLINIC | Facility: CLINIC | Age: 72
End: 2022-10-20
Payer: MEDICARE

## 2022-10-20 VITALS
HEIGHT: 68 IN | DIASTOLIC BLOOD PRESSURE: 60 MMHG | WEIGHT: 127 LBS | TEMPERATURE: 98 F | OXYGEN SATURATION: 97 % | HEART RATE: 68 BPM | SYSTOLIC BLOOD PRESSURE: 100 MMHG | BODY MASS INDEX: 19.25 KG/M2

## 2022-10-20 DIAGNOSIS — Z12.5 SCREENING FOR PROSTATE CANCER: ICD-10-CM

## 2022-10-20 DIAGNOSIS — H93.233 HYPERACUSIS OF BOTH EARS: Primary | ICD-10-CM

## 2022-10-20 DIAGNOSIS — E89.2 HISTORY OF PARATHYROIDECTOMY (HCC): ICD-10-CM

## 2022-10-20 DIAGNOSIS — R79.89 ELEVATED LFTS: ICD-10-CM

## 2022-10-20 DIAGNOSIS — L29.9 PRURITUS: ICD-10-CM

## 2022-10-20 DIAGNOSIS — R63.4 WEIGHT LOSS: ICD-10-CM

## 2022-10-20 PROCEDURE — 99215 OFFICE O/P EST HI 40 MIN: CPT | Performed by: INTERNAL MEDICINE

## 2022-10-20 PROCEDURE — 1126F AMNT PAIN NOTED NONE PRSNT: CPT | Performed by: INTERNAL MEDICINE

## 2022-10-20 RX ORDER — NALTREXONE HYDROCHLORIDE 50 MG/1
50 TABLET, FILM COATED ORAL DAILY
Qty: 90 TABLET | Refills: 1 | Status: SHIPPED | OUTPATIENT
Start: 2022-10-20

## 2022-10-20 RX ORDER — LORAZEPAM 1 MG/1
TABLET ORAL
COMMUNITY
Start: 2022-09-22 | End: 2022-10-20

## 2022-10-24 ENCOUNTER — LAB ENCOUNTER (OUTPATIENT)
Dept: LAB | Facility: HOSPITAL | Age: 72
End: 2022-10-24
Attending: INTERNAL MEDICINE
Payer: MEDICARE

## 2022-10-24 DIAGNOSIS — L29.9 PRURITUS: ICD-10-CM

## 2022-10-24 DIAGNOSIS — R63.4 WEIGHT LOSS: ICD-10-CM

## 2022-10-24 DIAGNOSIS — E89.2 HISTORY OF PARATHYROIDECTOMY (HCC): ICD-10-CM

## 2022-10-24 DIAGNOSIS — R79.89 ELEVATED LFTS: ICD-10-CM

## 2022-10-24 LAB
ALBUMIN SERPL-MCNC: 3.6 G/DL (ref 3.4–5)
ALBUMIN/GLOB SERPL: 1.3 {RATIO} (ref 1–2)
ALP LIVER SERPL-CCNC: 65 U/L
ALT SERPL-CCNC: 64 U/L
ANION GAP SERPL CALC-SCNC: 3 MMOL/L (ref 0–18)
AST SERPL-CCNC: 28 U/L (ref 15–37)
BASOPHILS # BLD AUTO: 0.04 X10(3) UL (ref 0–0.2)
BASOPHILS NFR BLD AUTO: 0.8 %
BILIRUB SERPL-MCNC: 0.7 MG/DL (ref 0.1–2)
BUN BLD-MCNC: 18 MG/DL (ref 7–18)
BUN/CREAT SERPL: 23.1 (ref 10–20)
CALCIUM BLD-MCNC: 9 MG/DL (ref 8.5–10.1)
CHLORIDE SERPL-SCNC: 109 MMOL/L (ref 98–112)
CO2 SERPL-SCNC: 31 MMOL/L (ref 21–32)
CREAT BLD-MCNC: 0.78 MG/DL
DEPRECATED HBV CORE AB SER IA-ACNC: 154.2 NG/ML
DEPRECATED RDW RBC AUTO: 46.8 FL (ref 35.1–46.3)
EOSINOPHIL # BLD AUTO: 0.09 X10(3) UL (ref 0–0.7)
EOSINOPHIL NFR BLD AUTO: 1.8 %
ERYTHROCYTE [DISTWIDTH] IN BLOOD BY AUTOMATED COUNT: 13 % (ref 11–15)
FASTING STATUS PATIENT QL REPORTED: YES
GFR SERPLBLD BASED ON 1.73 SQ M-ARVRAT: 95 ML/MIN/1.73M2 (ref 60–?)
GLOBULIN PLAS-MCNC: 2.7 G/DL (ref 2.8–4.4)
GLUCOSE BLD-MCNC: 79 MG/DL (ref 70–99)
HAV AB SER QL IA: NONREACTIVE
HBV CORE AB SERPL QL IA: NONREACTIVE
HBV SURFACE AB SER QL: NONREACTIVE
HBV SURFACE AB SERPL IA-ACNC: <3.1 MIU/ML
HBV SURFACE AG SERPL QL IA: NONREACTIVE
HCT VFR BLD AUTO: 46.9 %
HCV AB SERPL QL IA: NONREACTIVE
HGB BLD-MCNC: 15.2 G/DL
HGB RETIC QN AUTO: 35.9 PG (ref 28.2–36.6)
IMM GRANULOCYTES # BLD AUTO: 0.01 X10(3) UL (ref 0–1)
IMM GRANULOCYTES NFR BLD: 0.2 %
IMM RETICS NFR: 0.09 RATIO (ref 0.1–0.3)
IRON SATN MFR SERPL: 29 %
IRON SERPL-MCNC: 82 UG/DL
LYMPHOCYTES # BLD AUTO: 1.48 X10(3) UL (ref 1–4)
LYMPHOCYTES NFR BLD AUTO: 29.8 %
MCH RBC QN AUTO: 31.6 PG (ref 26–34)
MCHC RBC AUTO-ENTMCNC: 32.4 G/DL (ref 31–37)
MCV RBC AUTO: 97.5 FL
MONOCYTES # BLD AUTO: 0.32 X10(3) UL (ref 0.1–1)
MONOCYTES NFR BLD AUTO: 6.5 %
NEUTROPHILS # BLD AUTO: 3.02 X10 (3) UL (ref 1.5–7.7)
NEUTROPHILS # BLD AUTO: 3.02 X10(3) UL (ref 1.5–7.7)
NEUTROPHILS NFR BLD AUTO: 60.9 %
OSMOLALITY SERPL CALC.SUM OF ELEC: 297 MOSM/KG (ref 275–295)
PLATELET # BLD AUTO: 166 10(3)UL (ref 150–450)
POTASSIUM SERPL-SCNC: 4.1 MMOL/L (ref 3.5–5.1)
PROT SERPL-MCNC: 6.3 G/DL (ref 6.4–8.2)
RBC # BLD AUTO: 4.81 X10(6)UL
RETICS # AUTO: 41.8 X10(3) UL (ref 22.5–147.5)
RETICS/RBC NFR AUTO: 0.9 %
SODIUM SERPL-SCNC: 143 MMOL/L (ref 136–145)
TIBC SERPL-MCNC: 280 UG/DL (ref 240–450)
TRANSFERRIN SERPL-MCNC: 188 MG/DL (ref 200–360)
WBC # BLD AUTO: 5 X10(3) UL (ref 4–11)

## 2022-10-24 PROCEDURE — 87340 HEPATITIS B SURFACE AG IA: CPT

## 2022-10-24 PROCEDURE — 85025 COMPLETE CBC W/AUTO DIFF WBC: CPT

## 2022-10-24 PROCEDURE — 84466 ASSAY OF TRANSFERRIN: CPT

## 2022-10-24 PROCEDURE — 86803 HEPATITIS C AB TEST: CPT

## 2022-10-24 PROCEDURE — 82728 ASSAY OF FERRITIN: CPT

## 2022-10-24 PROCEDURE — 85045 AUTOMATED RETICULOCYTE COUNT: CPT

## 2022-10-24 PROCEDURE — 86708 HEPATITIS A ANTIBODY: CPT

## 2022-10-24 PROCEDURE — 86704 HEP B CORE ANTIBODY TOTAL: CPT

## 2022-10-24 PROCEDURE — 80503 PATH CLIN CONSLTJ SF 5-20: CPT

## 2022-10-24 PROCEDURE — 36415 COLL VENOUS BLD VENIPUNCTURE: CPT

## 2022-10-24 PROCEDURE — 86706 HEP B SURFACE ANTIBODY: CPT

## 2022-10-24 PROCEDURE — 80053 COMPREHEN METABOLIC PANEL: CPT

## 2022-10-24 PROCEDURE — 83540 ASSAY OF IRON: CPT

## 2022-11-10 ENCOUNTER — PATIENT MESSAGE (OUTPATIENT)
Dept: OTOLARYNGOLOGY | Facility: CLINIC | Age: 72
End: 2022-11-10

## 2022-11-14 NOTE — TELEPHONE ENCOUNTER
From: Jose Dominguez  To: Seamus Edwards. Payton George MD  Sent: 11/10/2022 4:09 PM CST  Subject: Copies of hearing tests    Would appreciate receiving a copy of most recent hearing test, please. Thank you.   Daniel Lau

## 2023-01-03 ENCOUNTER — PATIENT MESSAGE (OUTPATIENT)
Dept: INTERNAL MEDICINE CLINIC | Facility: CLINIC | Age: 73
End: 2023-01-03

## 2023-01-03 DIAGNOSIS — R10.9 FLANK PAIN: Primary | ICD-10-CM

## 2023-01-04 NOTE — TELEPHONE ENCOUNTER
From: Misa Tobar  To: Brian Segura MD  Sent: 1/3/2023 10:00 AM CST  Subject: Requesting blood test    Robel Lloyd. Sasha Pearce has asked me to contact you regarding having a blood test to check his calcium levels and also to check his kidneys. He's having pains on either side of his lower back. Thank you. Happy New Year!

## 2023-01-10 ENCOUNTER — LAB ENCOUNTER (OUTPATIENT)
Dept: LAB | Facility: HOSPITAL | Age: 73
End: 2023-01-10
Attending: INTERNAL MEDICINE
Payer: MEDICARE

## 2023-01-10 DIAGNOSIS — R10.9 FLANK PAIN: ICD-10-CM

## 2023-01-10 LAB
ALBUMIN SERPL-MCNC: 3.5 G/DL (ref 3.4–5)
ALBUMIN/GLOB SERPL: 1.2 {RATIO} (ref 1–2)
ALP LIVER SERPL-CCNC: 88 U/L
ALT SERPL-CCNC: 68 U/L
ANION GAP SERPL CALC-SCNC: 3 MMOL/L (ref 0–18)
AST SERPL-CCNC: 31 U/L (ref 15–37)
BASOPHILS # BLD AUTO: 0.04 X10(3) UL (ref 0–0.2)
BASOPHILS NFR BLD AUTO: 0.5 %
BILIRUB SERPL-MCNC: 0.7 MG/DL (ref 0.1–2)
BUN BLD-MCNC: 19 MG/DL (ref 7–18)
BUN/CREAT SERPL: 26.4 (ref 10–20)
CALCIUM BLD-MCNC: 8.9 MG/DL (ref 8.5–10.1)
CHLORIDE SERPL-SCNC: 108 MMOL/L (ref 98–112)
CO2 SERPL-SCNC: 32 MMOL/L (ref 21–32)
CREAT BLD-MCNC: 0.72 MG/DL
DEPRECATED RDW RBC AUTO: 46.3 FL (ref 35.1–46.3)
EOSINOPHIL # BLD AUTO: 0.11 X10(3) UL (ref 0–0.7)
EOSINOPHIL NFR BLD AUTO: 1.4 %
ERYTHROCYTE [DISTWIDTH] IN BLOOD BY AUTOMATED COUNT: 13.1 % (ref 11–15)
FASTING STATUS PATIENT QL REPORTED: YES
GFR SERPLBLD BASED ON 1.73 SQ M-ARVRAT: 98 ML/MIN/1.73M2 (ref 60–?)
GLOBULIN PLAS-MCNC: 2.9 G/DL (ref 2.8–4.4)
GLUCOSE BLD-MCNC: 84 MG/DL (ref 70–99)
HCT VFR BLD AUTO: 44.6 %
HGB BLD-MCNC: 14.3 G/DL
IMM GRANULOCYTES # BLD AUTO: 0.01 X10(3) UL (ref 0–1)
IMM GRANULOCYTES NFR BLD: 0.1 %
LYMPHOCYTES # BLD AUTO: 1.21 X10(3) UL (ref 1–4)
LYMPHOCYTES NFR BLD AUTO: 15.8 %
MCH RBC QN AUTO: 31.4 PG (ref 26–34)
MCHC RBC AUTO-ENTMCNC: 32.1 G/DL (ref 31–37)
MCV RBC AUTO: 97.8 FL
MONOCYTES # BLD AUTO: 0.59 X10(3) UL (ref 0.1–1)
MONOCYTES NFR BLD AUTO: 7.7 %
NEUTROPHILS # BLD AUTO: 5.72 X10 (3) UL (ref 1.5–7.7)
NEUTROPHILS # BLD AUTO: 5.72 X10(3) UL (ref 1.5–7.7)
NEUTROPHILS NFR BLD AUTO: 74.5 %
OSMOLALITY SERPL CALC.SUM OF ELEC: 297 MOSM/KG (ref 275–295)
PLATELET # BLD AUTO: 189 10(3)UL (ref 150–450)
POTASSIUM SERPL-SCNC: 3.8 MMOL/L (ref 3.5–5.1)
PROT SERPL-MCNC: 6.4 G/DL (ref 6.4–8.2)
RBC # BLD AUTO: 4.56 X10(6)UL
SODIUM SERPL-SCNC: 143 MMOL/L (ref 136–145)
WBC # BLD AUTO: 7.7 X10(3) UL (ref 4–11)

## 2023-01-10 PROCEDURE — 81003 URINALYSIS AUTO W/O SCOPE: CPT

## 2023-01-10 PROCEDURE — 85025 COMPLETE CBC W/AUTO DIFF WBC: CPT

## 2023-01-10 PROCEDURE — 80053 COMPREHEN METABOLIC PANEL: CPT

## 2023-01-10 PROCEDURE — 36415 COLL VENOUS BLD VENIPUNCTURE: CPT

## 2023-01-11 LAB
BILIRUB UR QL: NEGATIVE
CLARITY UR: CLEAR
COLOR UR: YELLOW
GLUCOSE UR-MCNC: NEGATIVE MG/DL
HGB UR QL STRIP.AUTO: NEGATIVE
KETONES UR-MCNC: NEGATIVE MG/DL
LEUKOCYTE ESTERASE UR QL STRIP.AUTO: NEGATIVE
NITRITE UR QL STRIP.AUTO: NEGATIVE
PH UR: 7 [PH] (ref 5–8)
PROT UR-MCNC: NEGATIVE MG/DL
SP GR UR STRIP: 1.02 (ref 1–1.03)
UROBILINOGEN UR STRIP-ACNC: 2

## 2023-01-12 NOTE — TELEPHONE ENCOUNTER
Reviewed lab work:    CMP showed stable elevation of liver blood test.  CBC normal  Urinalysis unremarkable    [employer] message sent

## 2023-01-20 ENCOUNTER — OFFICE VISIT (OUTPATIENT)
Dept: RHEUMATOLOGY | Facility: CLINIC | Age: 73
End: 2023-01-20

## 2023-01-20 ENCOUNTER — LAB ENCOUNTER (OUTPATIENT)
Dept: LAB | Facility: HOSPITAL | Age: 73
End: 2023-01-20
Attending: INTERNAL MEDICINE
Payer: MEDICARE

## 2023-01-20 VITALS
SYSTOLIC BLOOD PRESSURE: 107 MMHG | WEIGHT: 115 LBS | HEIGHT: 68 IN | HEART RATE: 71 BPM | BODY MASS INDEX: 17.43 KG/M2 | DIASTOLIC BLOOD PRESSURE: 71 MMHG

## 2023-01-20 DIAGNOSIS — L29.9 PRURITUS: Primary | ICD-10-CM

## 2023-01-20 LAB
CRP SERPL-MCNC: <0.29 MG/DL (ref ?–0.3)
ERYTHROCYTE [SEDIMENTATION RATE] IN BLOOD: 5 MM/HR
RHEUMATOID FACT SERPL-ACNC: <10 IU/ML (ref ?–15)

## 2023-01-20 PROCEDURE — 99204 OFFICE O/P NEW MOD 45 MIN: CPT | Performed by: INTERNAL MEDICINE

## 2023-01-20 PROCEDURE — 86200 CCP ANTIBODY: CPT | Performed by: INTERNAL MEDICINE

## 2023-01-20 PROCEDURE — 1125F AMNT PAIN NOTED PAIN PRSNT: CPT | Performed by: INTERNAL MEDICINE

## 2023-01-20 PROCEDURE — 36415 COLL VENOUS BLD VENIPUNCTURE: CPT | Performed by: INTERNAL MEDICINE

## 2023-01-20 PROCEDURE — 85652 RBC SED RATE AUTOMATED: CPT | Performed by: INTERNAL MEDICINE

## 2023-01-20 PROCEDURE — 86431 RHEUMATOID FACTOR QUANT: CPT | Performed by: INTERNAL MEDICINE

## 2023-01-20 PROCEDURE — 86140 C-REACTIVE PROTEIN: CPT | Performed by: INTERNAL MEDICINE

## 2023-01-20 NOTE — PATIENT INSTRUCTIONS
You were seen today for pruritus which is itchiness  Will evaluate for anything autoimmune but my suspicion is low  Due to the naltrexone 50 mg daily that your primary care doctor prescribed  Blood work today if you have time

## 2023-01-24 LAB — CCP IGG SERPL-ACNC: 1.5 U/ML (ref 0–6.9)

## 2023-01-26 ENCOUNTER — OFFICE VISIT (OUTPATIENT)
Dept: OTOLARYNGOLOGY | Facility: CLINIC | Age: 73
End: 2023-01-26

## 2023-01-26 VITALS — HEIGHT: 68 IN | TEMPERATURE: 98 F | WEIGHT: 115 LBS | BODY MASS INDEX: 17.43 KG/M2

## 2023-01-26 DIAGNOSIS — H92.02 ACUTE OTALGIA, LEFT: ICD-10-CM

## 2023-01-26 DIAGNOSIS — H91.93 PROGRESSIVE HEARING LOSS OF BOTH EARS: Primary | ICD-10-CM

## 2023-01-26 PROCEDURE — 99214 OFFICE O/P EST MOD 30 MIN: CPT | Performed by: SPECIALIST

## 2023-01-26 NOTE — PATIENT INSTRUCTIONS
An MRI was ordered for your progressive hearing loss. You did not want to repeat your audiogram on the date of the visit. No abnormalities noted on otologic evaluation. Some tenderness with palpation of the left pterygoid muscle this may account for the left otalgia.

## 2023-02-04 NOTE — PATIENT INSTRUCTIONS
You were seen in clinic today for reevaluation of your intense pruritus. We did review your recent subspecialty evaluation by neurology, rheumatology, and ENT regarding your hearing concerns. Precise cause remains unclear of your symptoms. It is difficult to control the pruritus without consistent use of medications due to concern for triggering your tinnitus. We recommended:  -Further work-up including blood test, urine test.  - We are checking hormone levels that should be obtained in the early morning 8 AM-9 AM  - Some studies have shown that severe unrelenting pruritus may lead to a malignant process such as cancer. We are obtaining a CT scan of the abdomen and pelvis to further evaluate to ensure no changes from a year ago  - Lets obtain an MRI of the brain to work-up your tinnitus further    We discussed multiple medications we can consider for symptom relief. There is difficulty given potential side effects. I would still recommend the use of naltrexone as a seems to be more benign with regards to side effects as compared to the other attempted medications  - Topical we can use his cane for or menthol over-the-counter. This can be used in the scalp 2-4 times a day as needed. Return to clinic in 3 months for follow-up.

## 2023-02-06 ENCOUNTER — OFFICE VISIT (OUTPATIENT)
Dept: INTERNAL MEDICINE CLINIC | Facility: CLINIC | Age: 73
End: 2023-02-06

## 2023-02-06 VITALS
HEART RATE: 65 BPM | SYSTOLIC BLOOD PRESSURE: 112 MMHG | WEIGHT: 115 LBS | DIASTOLIC BLOOD PRESSURE: 64 MMHG | BODY MASS INDEX: 17.43 KG/M2 | TEMPERATURE: 97 F | OXYGEN SATURATION: 97 % | HEIGHT: 68 IN

## 2023-02-06 DIAGNOSIS — L29.9 SEVERE PRURITUS: ICD-10-CM

## 2023-02-06 DIAGNOSIS — Q64.9 URINARY ANOMALY: ICD-10-CM

## 2023-02-06 DIAGNOSIS — E89.2 HISTORY OF PARATHYROIDECTOMY (HCC): ICD-10-CM

## 2023-02-06 DIAGNOSIS — L29.9 PRURITUS: Primary | ICD-10-CM

## 2023-02-06 DIAGNOSIS — H93.13 TINNITUS OF BOTH EARS: ICD-10-CM

## 2023-02-06 DIAGNOSIS — N40.0 ENLARGED PROSTATE: ICD-10-CM

## 2023-02-06 DIAGNOSIS — E55.9 VITAMIN D DEFICIENCY: ICD-10-CM

## 2023-02-06 DIAGNOSIS — R63.4 UNINTENTIONAL WEIGHT LOSS: ICD-10-CM

## 2023-02-06 DIAGNOSIS — Z12.5 SCREENING FOR PROSTATE CANCER: ICD-10-CM

## 2023-02-06 PROCEDURE — 99215 OFFICE O/P EST HI 40 MIN: CPT | Performed by: INTERNAL MEDICINE

## 2023-02-06 PROCEDURE — 1125F AMNT PAIN NOTED PAIN PRSNT: CPT | Performed by: INTERNAL MEDICINE

## 2023-02-06 RX ORDER — LORAZEPAM 1 MG/1
TABLET ORAL
COMMUNITY
Start: 2023-01-16

## 2023-02-10 ENCOUNTER — PATIENT MESSAGE (OUTPATIENT)
Dept: INTERNAL MEDICINE CLINIC | Facility: CLINIC | Age: 73
End: 2023-02-10

## 2023-02-10 ENCOUNTER — TELEPHONE (OUTPATIENT)
Dept: INTERNAL MEDICINE CLINIC | Facility: CLINIC | Age: 73
End: 2023-02-10

## 2023-02-10 NOTE — TELEPHONE ENCOUNTER
I called and spoke to wife Kurt Dubin, ok per [employer]aa. She states when they initially sent the [employer] message, copied below, they were looking to see if Dr Alex Gutierrez could recommend someone who could see patient sooner than March 17th    But now she tells me they are also concerned because Crispin Brewster has been drinking a lot of water but is only passing very little urine. Says he has passed urine about 5-6 times day, but every time it is a very little amount. \"Every once in a while he has an urge, but not very much comes out\"    Pat then passed the phone to Trace Stewart estimates he has had about 3 glasses of water today. He is concerned about how much he is urinating for the amount of water he has taken in. Tells me that usually when he drinks this amount of water, a much larger amount of urine comes out. States the urine is clear. There is no blood. No odor. No back pain. Denies abdominal fullness or bloating. Denies nausea or vomiting. States his appetite is ok. Denies fever or chills. He does not have pain when passing urine. Sates he is having trouble starting his stream. This is worse today than it has been, length of time is longer than it had been. Says usually when he drinks this much water he has diarrhea. He has not had diarrhea yet today. States today his stomach pains are mild. He is fatigued all the time, is worse today. States this issue happened to him 3 or 4 days ago and he drank more water and it seemed to fix the issue. Now it is not. He also tells me about his extreme itching.     To Dr Alex Gutierrez

## 2023-02-10 NOTE — TELEPHONE ENCOUNTER
Should get seen more acutely in  ER if there's concern for oliguria/anuria and worsening of symptoms. May need more expedited work-up.     I can try to see if Dr. Diana Rodriguez can see patient sooner, may not get a response until next week

## 2023-02-10 NOTE — TELEPHONE ENCOUNTER
I spoke to wife Juliet Salazar ok per hipaa and relayed Dr Jacinda Sanchez message that patient should be evaluated in the emergency room  Wife told me that Afshin Solorazno is so weak right now could we go tomorrow\"  I advised that especially given this weakness, he should present to the ER immediately. If unable to go in car, should go by ambulance. She verbalized understanding. I asked wife if she needed assistance to call 911 for ambulance. She said she did not.     FYI to Dr Franco Brooks

## 2023-02-13 NOTE — TELEPHONE ENCOUNTER
As FYI to DR. OMALLEY - called spouse who states patient is able to urinate again ,itching all over , no blood in urine , denies fever .  Spouse states he is very weak , RN instructed spouse that if patient cannot urinate, gets a fever, or any pain , doesn,t eat or drink he needs to go to ER - she verbalized understanding and will call an ambulance if needed

## 2023-02-21 ENCOUNTER — LAB ENCOUNTER (OUTPATIENT)
Dept: LAB | Facility: HOSPITAL | Age: 73
End: 2023-02-21
Attending: INTERNAL MEDICINE
Payer: MEDICARE

## 2023-02-21 DIAGNOSIS — Z12.5 SCREENING FOR PROSTATE CANCER: ICD-10-CM

## 2023-02-21 DIAGNOSIS — Q64.9 URINARY ANOMALY: ICD-10-CM

## 2023-02-21 DIAGNOSIS — L29.9 PRURITUS: ICD-10-CM

## 2023-02-21 DIAGNOSIS — L29.9 SEVERE PRURITUS: ICD-10-CM

## 2023-02-21 DIAGNOSIS — E55.9 VITAMIN D DEFICIENCY: ICD-10-CM

## 2023-02-21 DIAGNOSIS — N40.0 ENLARGED PROSTATE: ICD-10-CM

## 2023-02-21 DIAGNOSIS — H93.13 TINNITUS OF BOTH EARS: ICD-10-CM

## 2023-02-21 DIAGNOSIS — R63.4 UNINTENTIONAL WEIGHT LOSS: ICD-10-CM

## 2023-02-21 LAB
ALBUMIN SERPL-MCNC: 3.7 G/DL (ref 3.4–5)
ALBUMIN/GLOB SERPL: 1.1 {RATIO} (ref 1–2)
ALP LIVER SERPL-CCNC: 81 U/L
ALT SERPL-CCNC: 64 U/L
ANION GAP SERPL CALC-SCNC: 5 MMOL/L (ref 0–18)
AST SERPL-CCNC: 29 U/L (ref 15–37)
BASOPHILS # BLD AUTO: 0.04 X10(3) UL (ref 0–0.2)
BASOPHILS NFR BLD AUTO: 0.6 %
BILIRUB SERPL-MCNC: 0.7 MG/DL (ref 0.1–2)
BILIRUB UR QL: NEGATIVE
BUN BLD-MCNC: 18 MG/DL (ref 7–18)
BUN/CREAT SERPL: 23.1 (ref 10–20)
CALCIUM BLD-MCNC: 9.2 MG/DL (ref 8.5–10.1)
CHLORIDE SERPL-SCNC: 108 MMOL/L (ref 98–112)
CLARITY UR: CLEAR
CO2 SERPL-SCNC: 31 MMOL/L (ref 21–32)
COLOR UR: YELLOW
COMPLEXED PSA SERPL-MCNC: 3.72 NG/ML (ref ?–4)
CORTIS SERPL-MCNC: 11.1 UG/DL
CREAT BLD-MCNC: 0.78 MG/DL
DEPRECATED RDW RBC AUTO: 46.4 FL (ref 35.1–46.3)
EOSINOPHIL # BLD AUTO: 0.09 X10(3) UL (ref 0–0.7)
EOSINOPHIL NFR BLD AUTO: 1.4 %
ERYTHROCYTE [DISTWIDTH] IN BLOOD BY AUTOMATED COUNT: 12.8 % (ref 11–15)
FASTING STATUS PATIENT QL REPORTED: YES
GFR SERPLBLD BASED ON 1.73 SQ M-ARVRAT: 95 ML/MIN/1.73M2 (ref 60–?)
GLOBULIN PLAS-MCNC: 3.4 G/DL (ref 2.8–4.4)
GLUCOSE BLD-MCNC: 81 MG/DL (ref 70–99)
GLUCOSE UR-MCNC: NORMAL MG/DL
HCT VFR BLD AUTO: 49 %
HGB BLD-MCNC: 15.5 G/DL
HGB UR QL STRIP.AUTO: NEGATIVE
IMM GRANULOCYTES # BLD AUTO: 0.02 X10(3) UL (ref 0–1)
IMM GRANULOCYTES NFR BLD: 0.3 %
KETONES UR-MCNC: NEGATIVE MG/DL
LEUKOCYTE ESTERASE UR QL STRIP.AUTO: NEGATIVE
LYMPHOCYTES # BLD AUTO: 1.28 X10(3) UL (ref 1–4)
LYMPHOCYTES NFR BLD AUTO: 19.5 %
MCH RBC QN AUTO: 30.8 PG (ref 26–34)
MCHC RBC AUTO-ENTMCNC: 31.6 G/DL (ref 31–37)
MCV RBC AUTO: 97.4 FL
MONOCYTES # BLD AUTO: 0.42 X10(3) UL (ref 0.1–1)
MONOCYTES NFR BLD AUTO: 6.4 %
NEUTROPHILS # BLD AUTO: 4.72 X10 (3) UL (ref 1.5–7.7)
NEUTROPHILS # BLD AUTO: 4.72 X10(3) UL (ref 1.5–7.7)
NEUTROPHILS NFR BLD AUTO: 71.8 %
NITRITE UR QL STRIP.AUTO: NEGATIVE
OSMOLALITY SERPL CALC.SUM OF ELEC: 299 MOSM/KG (ref 275–295)
PH UR: 6.5 [PH] (ref 5–8)
PLATELET # BLD AUTO: 183 10(3)UL (ref 150–450)
POTASSIUM SERPL-SCNC: 3.8 MMOL/L (ref 3.5–5.1)
PROT SERPL-MCNC: 7.1 G/DL (ref 6.4–8.2)
PROT UR-MCNC: NEGATIVE MG/DL
RBC # BLD AUTO: 5.03 X10(6)UL
SODIUM SERPL-SCNC: 144 MMOL/L (ref 136–145)
SP GR UR STRIP: 1.02 (ref 1–1.03)
UROBILINOGEN UR STRIP-ACNC: 2
VIT D+METAB SERPL-MCNC: 36.2 NG/ML (ref 30–100)
WBC # BLD AUTO: 6.6 X10(3) UL (ref 4–11)

## 2023-02-21 PROCEDURE — 80053 COMPREHEN METABOLIC PANEL: CPT

## 2023-02-21 PROCEDURE — 83521 IG LIGHT CHAINS FREE EACH: CPT

## 2023-02-21 PROCEDURE — 36415 COLL VENOUS BLD VENIPUNCTURE: CPT

## 2023-02-21 PROCEDURE — 84403 ASSAY OF TOTAL TESTOSTERONE: CPT

## 2023-02-21 PROCEDURE — 82533 TOTAL CORTISOL: CPT

## 2023-02-21 PROCEDURE — 86334 IMMUNOFIX E-PHORESIS SERUM: CPT

## 2023-02-21 PROCEDURE — 82306 VITAMIN D 25 HYDROXY: CPT

## 2023-02-21 PROCEDURE — 85025 COMPLETE CBC W/AUTO DIFF WBC: CPT

## 2023-02-21 PROCEDURE — 84165 PROTEIN E-PHORESIS SERUM: CPT

## 2023-02-21 PROCEDURE — 84402 ASSAY OF FREE TESTOSTERONE: CPT

## 2023-02-24 LAB
ALBUMIN SERPL ELPH-MCNC: 4.17 G/DL (ref 3.75–5.21)
ALBUMIN/GLOB SERPL: 1.72 {RATIO} (ref 1–2)
ALPHA1 GLOB SERPL ELPH-MCNC: 0.3 G/DL (ref 0.19–0.46)
ALPHA2 GLOB SERPL ELPH-MCNC: 0.62 G/DL (ref 0.48–1.05)
B-GLOBULIN SERPL ELPH-MCNC: 0.75 G/DL (ref 0.68–1.23)
GAMMA GLOB SERPL ELPH-MCNC: 0.75 G/DL (ref 0.62–1.7)
KAPPA LC FREE SER-MCNC: 1.62 MG/DL (ref 0.33–1.94)
KAPPA LC FREE/LAMBDA FREE SER NEPH: 1.29 {RATIO} (ref 0.26–1.65)
LAMBDA LC FREE SERPL-MCNC: 1.26 MG/DL (ref 0.57–2.63)
PROT SERPL-MCNC: 6.6 G/DL (ref 6.4–8.2)

## 2023-03-01 LAB
TESTOSTERONE, FREE, S: 6.37 NG/DL
TESTOSTERONE, TOTAL, S: 522 NG/DL

## 2023-03-10 ENCOUNTER — TELEPHONE (OUTPATIENT)
Dept: INTERNAL MEDICINE CLINIC | Facility: CLINIC | Age: 73
End: 2023-03-10

## 2023-03-10 NOTE — TELEPHONE ENCOUNTER
Called patient , spoke with spouse and relayed DR. OMALLEY message - verbalized understanding .  RN explained to call dentist and explain to him antibiotics are not needed for orthopedic replacement surgeries  -she should ask  If he needs it for his root canal

## 2023-03-10 NOTE — TELEPHONE ENCOUNTER
Updated guidelines reports that antibiotic prophylaxis is not required for orthopedic replacement surgeries. He does not need antibiotics for this indication    He will need to discuss with his dentist if this is more so for the dental procedure itself?   If so, would defer antibiotics to his dental specialist

## 2023-03-10 NOTE — TELEPHONE ENCOUNTER
Pt had root canal on Wednesday,3/8/23, dentist prescribed antibiotic (Clindamycin)  Patient concerned about antibiotic as he read that this should not be prescribed. Patient quite concerned. Patient had knee replacement and was told he needs an antibiotic following dental work  Can alternate be prescribed by Dr Gifford Hamman?   Please call to advise  Tasked to nursing

## 2023-03-18 ENCOUNTER — HOSPITAL ENCOUNTER (OUTPATIENT)
Dept: CT IMAGING | Facility: HOSPITAL | Age: 73
Discharge: HOME | End: 2023-03-18
Attending: INTERNAL MEDICINE
Payer: MEDICARE

## 2023-03-18 DIAGNOSIS — L29.9 SEVERE PRURITUS: ICD-10-CM

## 2023-03-18 DIAGNOSIS — R63.4 UNINTENTIONAL WEIGHT LOSS: ICD-10-CM

## 2023-03-18 DIAGNOSIS — N40.0 ENLARGED PROSTATE: ICD-10-CM

## 2023-03-18 PROCEDURE — 74177 CT ABD & PELVIS W/CONTRAST: CPT | Performed by: INTERNAL MEDICINE

## 2023-03-21 ENCOUNTER — PATIENT MESSAGE (OUTPATIENT)
Dept: INTERNAL MEDICINE CLINIC | Facility: CLINIC | Age: 73
End: 2023-03-21

## 2023-03-21 NOTE — TELEPHONE ENCOUNTER
From: Yara Evans  To: Leonard Gonzalez MD  Sent: 3/21/2023 10:06 AM CDT  Subject: Test results    Dr. Lloyd Erm, we are not able to understand some of the info from the test results. I know you wanted Jaclyn Barrios to see you a few weeks after the tests had been done, but he had to have 2 root canals and is very weak. Would it be possible to set up a time to call instead of coming in? Thank you.   Ana Aguilar

## 2023-03-23 ENCOUNTER — VIRTUAL PHONE E/M (OUTPATIENT)
Dept: INTERNAL MEDICINE CLINIC | Facility: CLINIC | Age: 73
End: 2023-03-23

## 2023-03-23 DIAGNOSIS — L29.9 SEVERE PRURITUS: ICD-10-CM

## 2023-03-23 DIAGNOSIS — R68.89: ICD-10-CM

## 2023-03-23 DIAGNOSIS — R63.4 UNINTENTIONAL WEIGHT LOSS: ICD-10-CM

## 2023-03-23 DIAGNOSIS — E03.8 SUBCLINICAL HYPOTHYROIDISM: ICD-10-CM

## 2023-03-23 DIAGNOSIS — T56.3X4S: ICD-10-CM

## 2023-03-23 DIAGNOSIS — N40.0 ENLARGED PROSTATE: ICD-10-CM

## 2023-03-23 DIAGNOSIS — E89.2 HISTORY OF PARATHYROIDECTOMY (HCC): ICD-10-CM

## 2023-03-23 DIAGNOSIS — E55.9 VITAMIN D DEFICIENCY: ICD-10-CM

## 2023-03-23 DIAGNOSIS — H93.13 TINNITUS OF BOTH EARS: Primary | ICD-10-CM

## 2023-03-23 DIAGNOSIS — T56.0X4S TOXIC EFFECT OF LEAD AND ITS COMPOUNDS, UNDETERMINED, SEQUELA: ICD-10-CM

## 2023-03-26 NOTE — PATIENT INSTRUCTIONS
You were evaluated via phone visit for follow-up of your tinnitus and pruritus. This has been a difficult work-up thus far as most testing has come back largely unremarkable. We did review her most recent set of blood work. 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 You will need urology evaluation, you may try to see if you can get in sooner with Dr. Ben Almaguer  - Your kidney lab work is reassuring. Try to maintain good hydration as tolerable    We may need to perform colon cancer screening as part of your age-appropriate cancer screening    As we have limitations in management of your symptoms, lets try naltrexone once a day to see if we can have some control of your pruritus while trying to avoid exacerbation of your tinnitus.     Return to clinic in 6-8 weeks for follow-up

## 2023-06-14 ENCOUNTER — TELEPHONE (OUTPATIENT)
Dept: ENDOCRINOLOGY CLINIC | Facility: CLINIC | Age: 73
End: 2023-06-14

## 2023-06-14 NOTE — TELEPHONE ENCOUNTER
Received fax from [facility], attached is pt most recent progress note as of 6/12/2023. Placed in provider folder for further review.

## 2023-06-16 ENCOUNTER — PATIENT MESSAGE (OUTPATIENT)
Dept: INTERNAL MEDICINE CLINIC | Facility: CLINIC | Age: 73
End: 2023-06-16

## 2023-06-16 DIAGNOSIS — R63.4 UNINTENTIONAL WEIGHT LOSS: Primary | ICD-10-CM

## 2023-06-16 DIAGNOSIS — L29.9 SEVERE PRURITUS: ICD-10-CM

## 2023-06-16 DIAGNOSIS — Z91.048 ALLERGY TO MOLD: ICD-10-CM

## 2023-06-19 NOTE — TELEPHONE ENCOUNTER
From: Lana Pinto  To: Georgina Blount MD  Sent: 6/16/2023 5:28 PM CDT  Subject: Mold Toxicity Test    Dr. Mccain Adjutant, we are attempting to find out why my  is itching and why it is getting worse. I have been reading that mold can causes itching and he does have a mold allergy. When Shiva Lopez goes into the bathroom his scalp itches more and that's where I believe we have mold in our house. Does [place] have a mold toxicity testing available? It is a very specific blood and urine test that can find levels of mold that can be causing illness. Shiva Lopez is getting worse and worse and now spends the majority of 24 hours in bed. Thank you for your help.   Aiden Menard

## 2023-06-22 NOTE — TELEPHONE ENCOUNTER
Spoke wit lab [facility]) stated there is not test found for mold mix however there was one for allergen profile mole, verbalized this to MD , MD verbalized that it was okay

## 2023-06-22 NOTE — TELEPHONE ENCOUNTER
Wife Mary Tejeda called to check status on message  States pt's itching is over the top  Requests call back at [phone]

## 2023-06-22 NOTE — TELEPHONE ENCOUNTER
To pool, can we call lab and ask what the mold mix test looks for?  Want to make sure this is appropriate

## 2023-06-25 ENCOUNTER — APPOINTMENT (OUTPATIENT)
Dept: CT IMAGING | Facility: HOSPITAL | Age: 73
End: 2023-06-25
Attending: EMERGENCY MEDICINE
Payer: MEDICARE

## 2023-06-25 ENCOUNTER — LAB ENCOUNTER (OUTPATIENT)
Dept: LAB | Facility: HOSPITAL | Age: 73
End: 2023-06-25
Attending: SURGERY
Payer: MEDICARE

## 2023-06-25 ENCOUNTER — HOSPITAL ENCOUNTER (EMERGENCY)
Facility: HOSPITAL | Age: 73
Discharge: HOME | End: 2023-06-25
Attending: EMERGENCY MEDICINE
Payer: MEDICARE

## 2023-06-25 VITALS
SYSTOLIC BLOOD PRESSURE: 114 MMHG | DIASTOLIC BLOOD PRESSURE: 77 MMHG | OXYGEN SATURATION: 100 % | HEART RATE: 52 BPM | HEIGHT: 68 IN | BODY MASS INDEX: 16.67 KG/M2 | TEMPERATURE: 98 F | WEIGHT: 110 LBS | RESPIRATION RATE: 15 BRPM

## 2023-06-25 DIAGNOSIS — R63.4 WEIGHT LOSS: ICD-10-CM

## 2023-06-25 DIAGNOSIS — E03.2 HYPOTHYROIDISM DUE TO MEDICAMENTS AND OTHER EXOGENOUS SUBSTANCES: ICD-10-CM

## 2023-06-25 DIAGNOSIS — T56.0X4S TOXIC EFFECT OF LEAD AND ITS COMPOUNDS, UNDETERMINED, SEQUELA: ICD-10-CM

## 2023-06-25 DIAGNOSIS — E05.90 PRIMARY HYPERTHYROIDISM: Primary | ICD-10-CM

## 2023-06-25 DIAGNOSIS — Z91.048 ALLERGY TO MOLD: ICD-10-CM

## 2023-06-25 DIAGNOSIS — E03.8 SUBCLINICAL HYPOTHYROIDISM: ICD-10-CM

## 2023-06-25 DIAGNOSIS — T56.3X4S: ICD-10-CM

## 2023-06-25 DIAGNOSIS — L29.9 PRURITUS: Primary | ICD-10-CM

## 2023-06-25 DIAGNOSIS — L29.9 SEVERE PRURITUS: ICD-10-CM

## 2023-06-25 DIAGNOSIS — H93.13 TINNITUS OF BOTH EARS: ICD-10-CM

## 2023-06-25 DIAGNOSIS — E55.9 VITAMIN D DEFICIENCY: ICD-10-CM

## 2023-06-25 DIAGNOSIS — R68.89: ICD-10-CM

## 2023-06-25 DIAGNOSIS — R63.4 UNINTENTIONAL WEIGHT LOSS: ICD-10-CM

## 2023-06-25 LAB
ALBUMIN SERPL-MCNC: 3.4 G/DL (ref 3.4–5)
ALBUMIN SERPL-MCNC: 3.5 G/DL (ref 3.4–5)
ALBUMIN/GLOB SERPL: 1.4 {RATIO} (ref 1–2)
ALBUMIN/GLOB SERPL: 1.5 {RATIO} (ref 1–2)
ALP LIVER SERPL-CCNC: 61 U/L
ALP LIVER SERPL-CCNC: 63 U/L
ALT SERPL-CCNC: 92 U/L
ALT SERPL-CCNC: 93 U/L
ANION GAP SERPL CALC-SCNC: 2 MMOL/L (ref 0–18)
ANION GAP SERPL CALC-SCNC: 3 MMOL/L (ref 0–18)
AST SERPL-CCNC: 47 U/L (ref 15–37)
AST SERPL-CCNC: 47 U/L (ref 15–37)
BASOPHILS # BLD AUTO: 0.03 X10(3) UL (ref 0–0.2)
BASOPHILS NFR BLD AUTO: 0.8 %
BILIRUB SERPL-MCNC: 0.6 MG/DL (ref 0.1–2)
BILIRUB SERPL-MCNC: 0.6 MG/DL (ref 0.1–2)
BILIRUB UR QL: NEGATIVE
BUN BLD-MCNC: 13 MG/DL (ref 7–18)
BUN BLD-MCNC: 13 MG/DL (ref 7–18)
BUN/CREAT SERPL: 18.6 (ref 10–20)
BUN/CREAT SERPL: 20 (ref 10–20)
CALCIUM BLD-MCNC: 8.3 MG/DL (ref 8.5–10.1)
CALCIUM BLD-MCNC: 8.7 MG/DL (ref 8.5–10.1)
CHLORIDE SERPL-SCNC: 111 MMOL/L (ref 98–112)
CHLORIDE SERPL-SCNC: 113 MMOL/L (ref 98–112)
CLARITY UR: CLEAR
CO2 SERPL-SCNC: 31 MMOL/L (ref 21–32)
CO2 SERPL-SCNC: 32 MMOL/L (ref 21–32)
CREAT BLD-MCNC: 0.65 MG/DL
CREAT BLD-MCNC: 0.7 MG/DL
DEPRECATED RDW RBC AUTO: 46.9 FL (ref 35.1–46.3)
EOSINOPHIL # BLD AUTO: 0.08 X10(3) UL (ref 0–0.7)
EOSINOPHIL NFR BLD AUTO: 2 %
ERYTHROCYTE [DISTWIDTH] IN BLOOD BY AUTOMATED COUNT: 13 % (ref 11–15)
FASTING STATUS PATIENT QL REPORTED: YES
GFR SERPLBLD BASED ON 1.73 SQ M-ARVRAT: 100 ML/MIN/1.73M2 (ref 60–?)
GFR SERPLBLD BASED ON 1.73 SQ M-ARVRAT: 98 ML/MIN/1.73M2 (ref 60–?)
GLOBULIN PLAS-MCNC: 2.4 G/DL (ref 2.8–4.4)
GLOBULIN PLAS-MCNC: 2.5 G/DL (ref 2.8–4.4)
GLUCOSE BLD-MCNC: 84 MG/DL (ref 70–99)
GLUCOSE BLD-MCNC: 87 MG/DL (ref 70–99)
GLUCOSE UR-MCNC: NORMAL MG/DL
HCT VFR BLD AUTO: 45.2 %
HGB BLD-MCNC: 14.5 G/DL
HGB UR QL STRIP.AUTO: NEGATIVE
IMM GRANULOCYTES # BLD AUTO: 0 X10(3) UL (ref 0–1)
IMM GRANULOCYTES NFR BLD: 0 %
KETONES UR-MCNC: NEGATIVE MG/DL
LEUKOCYTE ESTERASE UR QL STRIP.AUTO: NEGATIVE
LIPASE SERPL-CCNC: 28 U/L (ref 13–75)
LYMPHOCYTES # BLD AUTO: 1.27 X10(3) UL (ref 1–4)
LYMPHOCYTES NFR BLD AUTO: 32.1 %
MCH RBC QN AUTO: 31.3 PG (ref 26–34)
MCHC RBC AUTO-ENTMCNC: 32.1 G/DL (ref 31–37)
MCV RBC AUTO: 97.4 FL
MONOCYTES # BLD AUTO: 0.28 X10(3) UL (ref 0.1–1)
MONOCYTES NFR BLD AUTO: 7.1 %
NEUTROPHILS # BLD AUTO: 2.3 X10 (3) UL (ref 1.5–7.7)
NEUTROPHILS # BLD AUTO: 2.3 X10(3) UL (ref 1.5–7.7)
NEUTROPHILS NFR BLD AUTO: 58 %
NITRITE UR QL STRIP.AUTO: NEGATIVE
OSMOLALITY SERPL CALC.SUM OF ELEC: 301 MOSM/KG (ref 275–295)
OSMOLALITY SERPL CALC.SUM OF ELEC: 301 MOSM/KG (ref 275–295)
PH UR: 5.5 [PH] (ref 5–8)
PLATELET # BLD AUTO: 169 10(3)UL (ref 150–450)
POTASSIUM SERPL-SCNC: 4.5 MMOL/L (ref 3.5–5.1)
POTASSIUM SERPL-SCNC: 4.6 MMOL/L (ref 3.5–5.1)
PROT SERPL-MCNC: 5.9 G/DL (ref 6.4–8.2)
PROT SERPL-MCNC: 5.9 G/DL (ref 6.4–8.2)
RBC # BLD AUTO: 4.64 X10(6)UL
SODIUM SERPL-SCNC: 146 MMOL/L (ref 136–145)
SODIUM SERPL-SCNC: 146 MMOL/L (ref 136–145)
SP GR UR STRIP: >1.03 (ref 1–1.03)
T4 FREE SERPL-MCNC: 0.9 NG/DL (ref 0.8–1.7)
TSI SER-ACNC: 2.6 MIU/ML (ref 0.36–3.74)
TSI SER-ACNC: 2.6 MIU/ML (ref 0.36–3.74)
UROBILINOGEN UR STRIP-ACNC: NORMAL
VIT B12 SERPL-MCNC: 386 PG/ML (ref 193–986)
VIT D+METAB SERPL-MCNC: 36.3 NG/ML (ref 30–100)
WBC # BLD AUTO: 4 X10(3) UL (ref 4–11)

## 2023-06-25 PROCEDURE — 84443 ASSAY THYROID STIM HORMONE: CPT

## 2023-06-25 PROCEDURE — 84439 ASSAY OF FREE THYROXINE: CPT

## 2023-06-25 PROCEDURE — 83655 ASSAY OF LEAD: CPT

## 2023-06-25 PROCEDURE — 83520 IMMUNOASSAY QUANT NOS NONAB: CPT

## 2023-06-25 PROCEDURE — 80053 COMPREHEN METABOLIC PANEL: CPT | Performed by: EMERGENCY MEDICINE

## 2023-06-25 PROCEDURE — 86003 ALLG SPEC IGE CRUDE XTRC EA: CPT

## 2023-06-25 PROCEDURE — 36415 COLL VENOUS BLD VENIPUNCTURE: CPT

## 2023-06-25 PROCEDURE — 83690 ASSAY OF LIPASE: CPT | Performed by: EMERGENCY MEDICINE

## 2023-06-25 PROCEDURE — 84443 ASSAY THYROID STIM HORMONE: CPT | Performed by: EMERGENCY MEDICINE

## 2023-06-25 PROCEDURE — 82306 VITAMIN D 25 HYDROXY: CPT

## 2023-06-25 PROCEDURE — 99284 EMERGENCY DEPT VISIT MOD MDM: CPT

## 2023-06-25 PROCEDURE — 96360 HYDRATION IV INFUSION INIT: CPT

## 2023-06-25 PROCEDURE — 86316 IMMUNOASSAY TUMOR OTHER: CPT

## 2023-06-25 PROCEDURE — 81001 URINALYSIS AUTO W/SCOPE: CPT | Performed by: EMERGENCY MEDICINE

## 2023-06-25 PROCEDURE — 82300 ASSAY OF CADMIUM: CPT

## 2023-06-25 PROCEDURE — 80053 COMPREHEN METABOLIC PANEL: CPT

## 2023-06-25 PROCEDURE — 83088 ASSAY OF HISTAMINE: CPT

## 2023-06-25 PROCEDURE — 82607 VITAMIN B-12: CPT

## 2023-06-25 PROCEDURE — 85025 COMPLETE CBC W/AUTO DIFF WBC: CPT

## 2023-06-25 PROCEDURE — 82785 ASSAY OF IGE: CPT

## 2023-06-25 NOTE — ED INITIAL ASSESSMENT (HPI)
Patient reports lower abd pain, 80lb weight loss \"without even trying\", fatigue, pruritus.  Denies f/n/v/d.

## 2023-06-25 NOTE — DISCHARGE INSTRUCTIONS
Follow-up with your primary physician. Return to the emergency department if fever, repeated vomiting, or other new symptoms develop.

## 2023-06-25 NOTE — ED QUICK NOTES
Patient with wife refused ct scan.  States that he has too much itching/weakness to sustain the exam.

## 2023-06-26 LAB — MOLD ALLERG MIX1 IGE QL: NEGATIVE

## 2023-06-27 ENCOUNTER — TELEPHONE (OUTPATIENT)
Dept: INTERNAL MEDICINE CLINIC | Facility: CLINIC | Age: 73
End: 2023-06-27

## 2023-06-27 LAB
CHROMOGRANIN A: 50.7 NG/ML
LEAD BLOOD ADULT: 1.9 UG/DL

## 2023-06-28 LAB
CADMIUM, BLOOD: <0.5 UG/L
HISTAMINE: 1.09 NG/ML

## 2023-06-29 ENCOUNTER — LAB ENCOUNTER (OUTPATIENT)
Dept: LAB | Facility: HOSPITAL | Age: 73
End: 2023-06-29
Attending: INTERNAL MEDICINE
Payer: MEDICARE

## 2023-06-29 DIAGNOSIS — L29.9 SEVERE PRURITUS: ICD-10-CM

## 2023-06-29 DIAGNOSIS — R63.4 UNINTENTIONAL WEIGHT LOSS: ICD-10-CM

## 2023-06-29 LAB
CLAM IGE QN: <0.1 KUA/L (ref ?–0.1)
CODFISH IGE QN: <0.1 KUA/L (ref ?–0.1)
CORN IGE QN: <0.1 KUA/L (ref ?–0.1)
COW MILK IGE QN: <0.1 KUA/L (ref ?–0.1)
EGG WHITE IGE QN: <0.1 KUA/L (ref ?–0.1)
IGE SERPL-ACNC: 18.4 KU/L (ref 2–214)
PEANUT IGE QN: <0.1 KUA/L (ref ?–0.1)
SCALLOP IGE QN: <0.1 KUA/L (ref ?–0.1)
SESAME SEED IGE QN: <0.1 KUA/L (ref ?–0.1)
SHRIMP IGE QN: <0.1 KUA/L (ref ?–0.1)
SOYBEAN IGE QN: <0.1 KUA/L (ref ?–0.1)
TRYPTASE: 3 UG/L
WALNUT IGE QN: <0.1 KUA/L (ref ?–0.1)
WHEAT IGE QN: <0.1 KUA/L (ref ?–0.1)

## 2023-06-29 PROCEDURE — 83497 ASSAY OF 5-HIAA: CPT

## 2023-06-30 ENCOUNTER — VIRTUAL PHONE E/M (OUTPATIENT)
Dept: INTERNAL MEDICINE CLINIC | Facility: CLINIC | Age: 73
End: 2023-06-30

## 2023-06-30 DIAGNOSIS — C80.1 MALIGNANCY (HCC): ICD-10-CM

## 2023-06-30 DIAGNOSIS — L29.9 SEVERE PRURITUS: ICD-10-CM

## 2023-06-30 DIAGNOSIS — E89.2 HISTORY OF PARATHYROIDECTOMY (HCC): ICD-10-CM

## 2023-06-30 DIAGNOSIS — R63.4 UNINTENTIONAL WEIGHT LOSS: Primary | ICD-10-CM

## 2023-06-30 DIAGNOSIS — F17.200 SMOKING: ICD-10-CM

## 2023-06-30 DIAGNOSIS — H93.13 TINNITUS OF BOTH EARS: ICD-10-CM

## 2023-06-30 RX ORDER — CHLORHEXIDINE GLUCONATE 0.12 MG/ML
15 RINSE ORAL AS DIRECTED
COMMUNITY
Start: 2023-05-13

## 2023-06-30 RX ORDER — PAROXETINE 10 MG/1
10 TABLET, FILM COATED ORAL EVERY MORNING
Qty: 90 TABLET | Refills: 1 | Status: SHIPPED | OUTPATIENT
Start: 2023-06-30

## 2023-07-03 ENCOUNTER — TELEPHONE (OUTPATIENT)
Dept: HEMATOLOGY/ONCOLOGY | Facility: HOSPITAL | Age: 73
End: 2023-07-03

## 2023-07-03 NOTE — TELEPHONE ENCOUNTER
Please call for a consult with Dr Waleska Quinn. Referred by Dr Tato Partida. DX-Liver numbers increased.

## 2023-07-07 LAB
5-HIAA 24H UR: 1.4 MG/24 HR
5-HIAA UR: 5.5 MG/L
CREAT 24H UR: 369 MG/24 HR
CREAT UR: 147.4 MG/DL

## 2023-07-10 ENCOUNTER — TELEPHONE (OUTPATIENT)
Dept: INTERNAL MEDICINE CLINIC | Facility: CLINIC | Age: 73
End: 2023-07-10

## 2023-07-10 DIAGNOSIS — L29.9 SEVERE PRURITUS: ICD-10-CM

## 2023-07-10 DIAGNOSIS — C80.1 MALIGNANCY (HCC): ICD-10-CM

## 2023-07-10 DIAGNOSIS — R63.4 UNINTENTIONAL WEIGHT LOSS: Primary | ICD-10-CM

## 2023-07-10 DIAGNOSIS — E89.2 HISTORY OF PARATHYROIDECTOMY (HCC): ICD-10-CM

## 2023-07-10 NOTE — TELEPHONE ENCOUNTER
Spouse, Vivian Reyes called and chose to leave a message on the [facility] voicemail. Vivian Reyes states the patient is schedule for a CT scan on 7/17/2023 and is supposed to drink the contrast. The last time the patient had to drink the contrast it upset his stomach. Patient is wondering is the order can be for injection of the contrast instead of drinking.       # [phone]

## 2023-07-10 NOTE — TELEPHONE ENCOUNTER
Please coordinate with CT department, okay to hold off on oral contrast.  The original order should have IV contrast, please confirm with CT department and notify patient when able.

## 2023-07-13 NOTE — TELEPHONE ENCOUNTER
Spoke with Lyle Bishop, pt informed that IV only contrast may not show all structural studies. Amenable to attempting to take in oral contrast and will try to take as  much as he can tolerate. Javi MAYFIELD on 7/17/23. At [facility].

## 2023-07-14 NOTE — TELEPHONE ENCOUNTER
Spouse Rosales Santamaria is calling to make sure the CT has been ordered so patient can have it completed  With IV Contrast or Oral Contrast    Patient is scheduled on 7/17  She did call central scheduling and was told it has not been ordered this way    Please call Rosales Santamaria to confirm [phone]

## 2023-07-14 NOTE — TELEPHONE ENCOUNTER
Called patient and wife back to clarify , looks like Nurse had already spoken to patient a few days ago and patient was agreeable to contrast, however no answer lmtcb and clarify the order.

## 2023-07-17 ENCOUNTER — HOSPITAL ENCOUNTER (OUTPATIENT)
Dept: CT IMAGING | Facility: HOSPITAL | Age: 73
Discharge: HOME | End: 2023-07-17
Attending: INTERNAL MEDICINE
Payer: MEDICARE

## 2023-07-17 DIAGNOSIS — F17.200 SMOKING: ICD-10-CM

## 2023-07-17 DIAGNOSIS — L29.9 SEVERE PRURITUS: ICD-10-CM

## 2023-07-17 DIAGNOSIS — R63.4 UNINTENTIONAL WEIGHT LOSS: ICD-10-CM

## 2023-07-17 DIAGNOSIS — C80.1 MALIGNANCY (HCC): ICD-10-CM

## 2023-07-17 DIAGNOSIS — E89.2 HISTORY OF PARATHYROIDECTOMY (HCC): ICD-10-CM

## 2023-07-17 PROCEDURE — 71260 CT THORAX DX C+: CPT | Performed by: INTERNAL MEDICINE

## 2023-07-17 PROCEDURE — 74177 CT ABD & PELVIS W/CONTRAST: CPT | Performed by: INTERNAL MEDICINE

## 2023-07-17 NOTE — TELEPHONE ENCOUNTER
Wife, Dc Faria calling back to speak with nursing. Wife states she was not asking about the contrast. Dc Faria states the patient asked Dr Dk Newsome if he could have an injection instead of drinking the contrast.      # [phone]   CT scan scheduled today for 2pm. Hoping to hear back before. Message routed on high.

## 2023-07-17 NOTE — TELEPHONE ENCOUNTER
Spoke with Joan Salazar from [employer]. Discussed pt requesting IV contrast only -- ok per Joan Salazar. No special instructions, ok to arrived at scheduled time. Pat updated. Nothing further needed at this time.

## 2023-07-17 NOTE — TELEPHONE ENCOUNTER
This was discussed last week and patient requested PO contrast as a trial. I'm OK without.  PLease d/w CT and confirm

## 2023-07-19 ENCOUNTER — OFFICE VISIT (OUTPATIENT)
Dept: HEMATOLOGY/ONCOLOGY | Facility: HOSPITAL | Age: 73
End: 2023-07-19
Attending: INTERNAL MEDICINE
Payer: MEDICARE

## 2023-07-19 VITALS
SYSTOLIC BLOOD PRESSURE: 92 MMHG | TEMPERATURE: 98 F | BODY MASS INDEX: 17.73 KG/M2 | HEART RATE: 67 BPM | WEIGHT: 117 LBS | HEIGHT: 68 IN | DIASTOLIC BLOOD PRESSURE: 61 MMHG | RESPIRATION RATE: 18 BRPM | OXYGEN SATURATION: 98 %

## 2023-07-19 DIAGNOSIS — R63.4 WEIGHT LOSS: ICD-10-CM

## 2023-07-19 DIAGNOSIS — L29.9 ITCHING: Primary | ICD-10-CM

## 2023-07-19 PROCEDURE — 99204 OFFICE O/P NEW MOD 45 MIN: CPT | Performed by: INTERNAL MEDICINE

## 2023-07-19 NOTE — CONSULTS
[facility]    PATIENT'S NAME: Jose Valdez   CONSULTING PHYSICIAN: Huy Briones. Harriet Maddox MD   PATIENT ACCOUNT #: [de-identified] LOCATION: [address] [place] [address] RECORD #: [id number] YOB: 1950   CONSULTATION DATE: 07/19/2023       [facility] NEW PATIENT CONSULT    REQUESTING PROVIDER:  Manjeet Jeronimo MD     REASON FOR CONSULTATION:  Unintentional weight loss, itching. HISTORY OF PRESENT ILLNESS:  The patient is a 81-year-old male with a history of multiple medical problems including anxiety, depression, hyperparathyroidism, chronic itching without an underlying dermatologic condition, being evaluated by Oncology for unintentional weight loss. The patient states that he previously weighed up to 185 pounds and is now down to 117 pounds in clinic today. This has somewhat stabilized in the last several months. His biggest complaint is itching, and he is very frustrated by this, having sought evaluation with Dermatology. He has no primary dermatologic pathology, and he admits to no visible skin changes. The patient states he is unable to tolerate 3 meals a day due to multiple symptoms, most of which are related to discomfort in his mouth and tinnitus. Per his wife, he has issues with abdominal pain when eating. The patient has had thorough evaluation with Gastroenterology with EGD with no malignancy found. He had a CT of the abdomen and pelvis 03/18/2023 and a CT of chest, abdomen and pelvis 07/17/2023, with no evidence of malignancy. During the course of the interview, the patient became frustrated stating he was uncomfortable with itching, and further interview is limited. 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 Per EMR, history of subarachnoid hemorrhage, previous alcohol dependence.     MEDICATIONS:  Per EMR, paroxetine, lorazepam.    ALLERGIES:  No known drug allergies per EMR. SOCIAL HISTORY:  Unable to be obtained from patient, as he left visit prior to answering all questions. FAMILY MEDICAL HISTORY:  Unable to be obtained from patient, as he left visit prior to answering all questions. PHYSICAL EXAMINATION:    GENERAL:  Thin appearing. No acute distress. VITAL SIGNS:  Weight is 53 kg. Temperature is 36.5. HEENT:  Moist mucous membranes. NECK:  Supple. LUNGS:  Symmetric expansion. ABDOMEN:  Nondistended. EXTREMITIES:  No edema. SKIN:  No visible lesions. LYMPHATICS:  No visible adenopathy. MUSCULOSKELETAL:  No deformity. LABORATORY DATA:  Reviewed include CBC 01/10/2023. White blood cell count 7, hemoglobin 14.3, platelets 251,915. No blasts or immature forms on peripheral smear. CMP reviewed without abnormalities. CTs reviewed as per HPI. Other data reviewed include gastroenterology notes. ASSESSMENT AND PLAN:  The patient is a 67-year-old male with history of multiple medical problems including anxiety, depression, chronic tinnitus, psychogenic itching. He is being evaluated by Medical Oncology for unintentional weight loss. The patient has had a thorough GI workup with EGD. He has had a previous colonoscopy. He has CT images on multiple occasions showing no evidence of malignancy including most recently a CT chest, abdomen and pelvis with contrast 07/17/2023. Per the patient and wife, he is not able to eat 3 meals a day due to other issues which include head and neck issues with tinnitus and mouth pain. It appears he has psychogenic itching, as there is no primary skin abnormality, no skin lesions on exam, and he has had dermatologic evaluation. This could be addressed with Psychiatry/Neurology. The patient became uncomfortable with his itching during the visit and stated he wanted to leave before interview and discussion were completed.       We discussed there is no evidence of an oncologic process on labs, imaging, and endoscopy. Dictated By Tosha Miller MD  d: 07/19/2023 12:26:47  t: 07/19/2023 15:06:18  [facility] [id number]  [facility]/

## 2023-07-21 ENCOUNTER — TELEPHONE (OUTPATIENT)
Facility: CLINIC | Age: 73
End: 2023-07-21

## 2023-07-21 DIAGNOSIS — R63.4 WEIGHT LOSS: Primary | ICD-10-CM

## 2023-07-21 NOTE — TELEPHONE ENCOUNTER
Patient's wife is calling states that patient had recent labs done and would like for dr. Micha Hardy to look at them. Is also asking if there is some blood work that the doctor would like for the patient.

## 2023-07-24 NOTE — TELEPHONE ENCOUNTER
Dr Lynsey Smith    I spoke to the patient's spouse,Pat (on HALEY), patient's /name verified. She stated the patient had labs and CT ABD  that was ordered by Dr Christine Marcos, pls review as requested. He is scheduled for Ov on 2023 but wife is requesting sooner appt.     He is 117 lbs now, used to weigh 160 lbs last year    Please advise    Thank you

## 2023-07-24 NOTE — TELEPHONE ENCOUNTER
Dr Marco A Hilliard    The patient's spouse is asking if you recommend any blood tests prior to the appt. On 7/31/2023.     Thank you

## 2023-07-24 NOTE — TELEPHONE ENCOUNTER
I spoke to the patient's spouse,Pat (on HALEY), patient's /name verified. Your appointments       Date & Time Appointment Department [facility])    2023 11:30 AM CDT Follow Up Visit with Melinda Solorzano MD [address], [facility],[address] Floor, [facility])      [address], [address] [facility],[address], [address]  [address] [place] [address],[address]  [phone]     Vera Valdovinos  verbalized the correct,date,time and location of appt.     2023 appt canceled as request.

## 2023-07-25 NOTE — TELEPHONE ENCOUNTER
I called and spoke to the patient's wife, PAT (on HALEY), patient's /name verified. Abnerfaye Ta was notified of new blood test ordered. She verbalized understanding by stating\"I will let Becca Annabel know so he can have his blood drawn before the appt. \"

## 2023-07-26 ENCOUNTER — LAB ENCOUNTER (OUTPATIENT)
Dept: LAB | Facility: HOSPITAL | Age: 73
End: 2023-07-26
Attending: INTERNAL MEDICINE
Payer: MEDICARE

## 2023-07-26 DIAGNOSIS — R63.4 WEIGHT LOSS: ICD-10-CM

## 2023-07-26 LAB
ALBUMIN SERPL-MCNC: 3.3 G/DL (ref 3.4–5)
ALBUMIN/GLOB SERPL: 1.4 {RATIO} (ref 1–2)
ALP LIVER SERPL-CCNC: 52 U/L
ALT SERPL-CCNC: 61 U/L
ANION GAP SERPL CALC-SCNC: 2 MMOL/L (ref 0–18)
AST SERPL-CCNC: 35 U/L (ref 15–37)
BILIRUB SERPL-MCNC: 0.6 MG/DL (ref 0.1–2)
BUN BLD-MCNC: 17 MG/DL (ref 7–18)
BUN/CREAT SERPL: 23 (ref 10–20)
CALCIUM BLD-MCNC: 8.6 MG/DL (ref 8.5–10.1)
CHLORIDE SERPL-SCNC: 110 MMOL/L (ref 98–112)
CO2 SERPL-SCNC: 32 MMOL/L (ref 21–32)
CREAT BLD-MCNC: 0.74 MG/DL
EGFRCR SERPLBLD CKD-EPI 2021: 96 ML/MIN/1.73M2 (ref 60–?)
FASTING STATUS PATIENT QL REPORTED: YES
GLOBULIN PLAS-MCNC: 2.4 G/DL (ref 2.8–4.4)
GLUCOSE BLD-MCNC: 79 MG/DL (ref 70–99)
OSMOLALITY SERPL CALC.SUM OF ELEC: 298 MOSM/KG (ref 275–295)
POTASSIUM SERPL-SCNC: 4.1 MMOL/L (ref 3.5–5.1)
PROT SERPL-MCNC: 5.7 G/DL (ref 6.4–8.2)
SODIUM SERPL-SCNC: 144 MMOL/L (ref 136–145)

## 2023-07-26 PROCEDURE — 36415 COLL VENOUS BLD VENIPUNCTURE: CPT

## 2023-07-26 PROCEDURE — 80053 COMPREHEN METABOLIC PANEL: CPT

## 2023-07-31 ENCOUNTER — OFFICE VISIT (OUTPATIENT)
Facility: CLINIC | Age: 73
End: 2023-07-31

## 2023-07-31 ENCOUNTER — LAB ENCOUNTER (OUTPATIENT)
Dept: LAB | Facility: HOSPITAL | Age: 73
End: 2023-07-31
Attending: INTERNAL MEDICINE
Payer: MEDICARE

## 2023-07-31 VITALS
WEIGHT: 117 LBS | DIASTOLIC BLOOD PRESSURE: 71 MMHG | HEIGHT: 68 IN | SYSTOLIC BLOOD PRESSURE: 115 MMHG | BODY MASS INDEX: 17.73 KG/M2

## 2023-07-31 DIAGNOSIS — L29.9 PRURITUS: ICD-10-CM

## 2023-07-31 DIAGNOSIS — R63.4 WEIGHT LOSS: ICD-10-CM

## 2023-07-31 DIAGNOSIS — L29.9 PRURITUS: Primary | ICD-10-CM

## 2023-07-31 PROCEDURE — 83516 IMMUNOASSAY NONANTIBODY: CPT

## 2023-07-31 PROCEDURE — 84311 SPECTROPHOTOMETRY: CPT

## 2023-07-31 PROCEDURE — 36415 COLL VENOUS BLD VENIPUNCTURE: CPT

## 2023-07-31 PROCEDURE — 99214 OFFICE O/P EST MOD 30 MIN: CPT | Performed by: INTERNAL MEDICINE

## 2023-08-01 ENCOUNTER — LAB ENCOUNTER (OUTPATIENT)
Dept: LAB | Facility: HOSPITAL | Age: 73
End: 2023-08-01
Attending: INTERNAL MEDICINE
Payer: MEDICARE

## 2023-08-01 DIAGNOSIS — L29.9 PRURITUS: ICD-10-CM

## 2023-08-01 LAB
ACTIN SMOOTH MUSCLE AB: 4 UNITS
M2 MITOCHONDRIAL AB: <20 UNITS

## 2023-08-01 PROCEDURE — 84120 ASSAY OF URINE PORPHYRINS: CPT

## 2023-08-01 PROCEDURE — 36415 COLL VENOUS BLD VENIPUNCTURE: CPT

## 2023-08-01 PROCEDURE — 84311 SPECTROPHOTOMETRY: CPT

## 2023-08-07 LAB
COPROPORCPIII 24H: 14 UG/24 HR
COPROPORPHCPI 24H: 3 UG/24 HR
COPROPORPHYRIN CP I: 34 UG/L
COPROPORPHYRIN CP III: 147 UG/L
CREAT 24H UR: 136 MG/24 HR
CREAT UR: 138.4 MG/DL
HEPTACARB7-CP 24H: 1 UG/24 HR
HEPTACARBOXYL 7-CP: 6 UG/L
HEXACARB6-CP 24H: 0 UG/24 HR
HEXACARBOXYL 6-CP: <1 UG/L
PENTACARB5-CP 24H: 0 UG/24 HR
PENTACARBOXYL 5-CP: <1 UG/L
UROPORPHUP 24H: 2 UG/24 HR
UROPORPHYRINS UP: 22 UG/L

## 2023-08-08 ENCOUNTER — TELEPHONE (OUTPATIENT)
Dept: HEMATOLOGY/ONCOLOGY | Facility: HOSPITAL | Age: 73
End: 2023-08-08

## 2023-08-08 NOTE — TELEPHONE ENCOUNTER
Patient's wife, Carin Matias called to speak to Britt Ballesteros regarding palliative care. Please call back. Thank you.

## 2023-08-09 LAB — Lab: <0.1 UG/DL

## 2023-08-10 ENCOUNTER — TELEPHONE (OUTPATIENT)
Dept: HEMATOLOGY/ONCOLOGY | Facility: HOSPITAL | Age: 73
End: 2023-08-10

## 2023-08-10 NOTE — TELEPHONE ENCOUNTER
Reviewed chart prior to returning Pat's call re: Palliative Care. I spoke with Luis's wife Stanislav Braswell. She was very tearful during the call. She reviewed with me Luis's last 2 years of health issues. She states that over the past few weeks his functional and physical statuses have been rapidly declining - in bed ~22 hours/day, itching all over, not sleeping well, continued weight loss, eating 1 meal per day. Stanislav Braswell had questions regarding Palliative Care. I explained the Palliative Care scope of services and criteria for insurance coverage. Stanislav Braswell states that Candance Cowper is unable to get to office appointments and requesting [address]. I also let Stanislav Braswell know that Palliative Care visits require a Palliative Care diagnosis (I.e. CHF, Cancer, dementia, etc), however, Candance Cowper may qualify to be seen based on his rapid decline in functional and health status. [facility] will run his benefits and check eligibility criteria when they receive order. Stanislav Braswell was appreciative of call and agrees with above plan. I will send this message to Dr. Abdifatah Rucker to enter order for [facility], if he agrees with plan.

## 2023-08-11 ENCOUNTER — MOBILE ENCOUNTER (OUTPATIENT)
Dept: HEMATOLOGY/ONCOLOGY | Facility: HOSPITAL | Age: 73
End: 2023-08-11

## 2023-08-22 ENCOUNTER — TELEPHONE (OUTPATIENT)
Dept: INTERNAL MEDICINE CLINIC | Facility: CLINIC | Age: 73
End: 2023-08-22

## 2023-08-22 ENCOUNTER — MED REC SCAN ONLY (OUTPATIENT)
Dept: INTERNAL MEDICINE CLINIC | Facility: CLINIC | Age: 73
End: 2023-08-22

## 2023-08-22 NOTE — TELEPHONE ENCOUNTER
Please fax over my video visit from today to [facility] to complete the palliative care packet.

## 2023-08-22 NOTE — TELEPHONE ENCOUNTER
Wife Donald Blum called stating she received a call from [facility] stating that Andrey Panda needs to be seen by Dr. Donte Garces before they can go out to start [facility] services. Wife asking if Dr. Donte Garces can set up a video visit with Carina Alpesh as soon as possible so [facility] services can start.

## 2023-08-22 NOTE — TELEPHONE ENCOUNTER
As FYI to DR. OMALLEY - faxed PHI to [address] group at [phone]-confirmation recieved  In holding bin , awaiting records from [facility]

## 2023-08-24 ENCOUNTER — TELEPHONE (OUTPATIENT)
Dept: HEMATOLOGY/ONCOLOGY | Facility: HOSPITAL | Age: 73
End: 2023-08-24

## 2023-08-25 ENCOUNTER — TELEPHONE (OUTPATIENT)
Dept: INTERNAL MEDICINE CLINIC | Facility: CLINIC | Age: 73
End: 2023-08-25

## 2023-08-25 NOTE — TELEPHONE ENCOUNTER
Lizandro Donahue / Eric is calling she opened patient for home care 8/25.     Patient will be having nursing, physical therapy & occupational therapy    Phone [phone] confidential VM

## 2023-08-31 ENCOUNTER — TELEPHONE (OUTPATIENT)
Dept: INTERNAL MEDICINE CLINIC | Facility: CLINIC | Age: 73
End: 2023-08-31

## 2023-08-31 RX ORDER — TAMSULOSIN HYDROCHLORIDE 0.4 MG/1
0.4 CAPSULE ORAL NIGHTLY
Qty: 30 CAPSULE | Refills: 0 | Status: SHIPPED | OUTPATIENT
Start: 2023-08-31 | End: 2023-09-30

## 2023-09-01 RX ORDER — TAMSULOSIN HYDROCHLORIDE 0.4 MG/1
0.4 CAPSULE ORAL NIGHTLY
Qty: 90 CAPSULE | Refills: 0 | OUTPATIENT
Start: 2023-09-01

## 2023-09-14 ENCOUNTER — TELEPHONE (OUTPATIENT)
Facility: CLINIC | Age: 73
End: 2023-09-14

## 2023-09-14 NOTE — TELEPHONE ENCOUNTER
I spoke with wife. She states patient continues to have chronic constipation,trouble urinating when he gets constipated ,weakness,lower abdominal pain after eating,weight loss(lost 50 pounds in 1 year) tinnitus and pruritus. Patient is bed bound due to weakness and itching. Last BM was 2 days ago. Taking Senocot and using suppositories as needed but not helping. Wife asking what else they can try. They do not want to go to the ED. Patient advised Dr. Dl Yip is out of the office till next Wednesday. Message sent to Dr. Fay Stanford who is on call and Dr. Darlene Rivera. Please advise. Thank you. Per 07/31/2023 office visit note-  Assessment & Plan:   Pruritus  (primary encounter diagnosis)  Weight loss  Crispin Brewster presents with intractable weight loss, intractable tinnitus and a dramatic weight loss. He has undergone extensive laboratory and imaging evaluation which has been negative. He has had mildly elevated transaminases (most recently normal), however, his alkaline phosphatase and bilirubin have been normal.  We have discussed that liver disease as the cause of his pruritus would be extremely unlikely based on the mild transaminase elevation (as mentioned normal on most recent testing). Likewise I would expect pruritus due to a systemic cause to be associated with intense itching and excoriation which is not the case. The patient's weight loss is likely due to limited caloric intake (he eats 1 meal a day and lies in bed for 22 hours). There is clearly a component of depression and it is difficult to ascertain whether this is primary or secondary. Treatment of the depression has been complicated by the inability to take any medications as any oral medications are felt to exacerbate tinnitus. I am recommending that we obtain an antimitochondrial antibody and a smooth muscle antibody although these are expected to be normal.  I would also screen for porphyrias.   Further recommendations pending results of the above testing and clinical course. Orders Placed This Encounter      Actin (Smooth Muscle) Antibody      Mitochondrial (M2) Antibody      Porphyrins, Qn, Random U      Porphyrins Total w/Rflx to Fractionation        Meds This Visit:  Requested Prescriptions        No prescriptions requested or ordered in this encounter         Imaging & Referrals:  None         06/23/2022 pathology result-  Final Diagnosis:      A. Duodenum biopsy:  Multiple fragments of small bowel mucosa demonstrating no significant pathologic changes. Normal villous architecture present. No evidence of celiac sprue, granuloma, dysplasia, or malignancy is identified. B. Gastric antrum; biopsy:  Fragments of antral gastric mucosa with focal lamina propria vascular ectasia. Diff Quik stain (with appropriate control reactivity) is negative for H pylori microorganisms. C. Gastric body; biopsy:  Fragments of gastric mucosa with lamina propria vascular ectasia. Diff Quik stain (with appropriate control reactivity) is negative for H pylori microorganisms. D. Distal esophagus; biopsy:   Multiple fragments of squamous and glandular gastric type epithelium with mild reflux esophagitis. Separate fragments of glandular gastric type mucosa with focal mild inactive chronic gastritis  No evidence of eosinophilic esophagitis, goblet cell intestinal metaplasia, dysplasia, or malignancy is identified. Diff Quik stain (with appropriate control reactivity) is negative for H pylori microorganisms.      Tonny Colindres

## 2023-09-14 NOTE — TELEPHONE ENCOUNTER
Patient's calling states patient is having trouble with bowel movements without taking anything, and also taken way too many suppositories that sometimes patient cannot urinate for two weeks. Please call and advise.

## 2023-09-14 NOTE — TELEPHONE ENCOUNTER
I spoke to Nelly and to [place]. Nelly continues to have multiple somatic complaints including generalized pruritus, tinnitus, weakness and now constipation and difficulty urinating. He also notes an excellent appetite in the morning but abdominal cramping and pain in the afternoon requiring laxatives and suppositories. We discussed that the patient has multiple somatic complaints with negative laboratory testing (including an M2 antibody and porphyria screening). We all agree that there is a component of depression and it is difficult to ascertain whether the patient's symptoms are due to depression (and somatization) or the depression is due to the symptoms. The patient would benefit from aggressive treatment of his depression. Whether this includes an SSRI, ketamine or ECT is unclear. The patient sees Dr. Nia Guerrier. I will contact Dr. Nia Guerrier next week regarding the above. In the interim Nelly will begin MiraLAX 17 g daily and titrate to bowel frequency. He may use suppositories until the MiraLAX becomes effective.

## 2023-09-20 ENCOUNTER — TELEPHONE (OUTPATIENT)
Facility: CLINIC | Age: 73
End: 2023-09-20

## 2023-09-20 ENCOUNTER — TELEPHONE (OUTPATIENT)
Dept: INTERNAL MEDICINE CLINIC | Facility: CLINIC | Age: 73
End: 2023-09-20

## 2023-09-20 DIAGNOSIS — Z12.5 SCREENING FOR PROSTATE CANCER: ICD-10-CM

## 2023-09-20 DIAGNOSIS — R33.9 URINARY RETENTION: ICD-10-CM

## 2023-09-20 DIAGNOSIS — R79.89 ELEVATED LFTS: Primary | ICD-10-CM

## 2023-09-20 DIAGNOSIS — L29.9 PRURITUS: ICD-10-CM

## 2023-09-20 DIAGNOSIS — N40.0 ENLARGED PROSTATE: ICD-10-CM

## 2023-09-20 DIAGNOSIS — Z13.0 SCREENING FOR DEFICIENCY ANEMIA: ICD-10-CM

## 2023-09-20 DIAGNOSIS — Z13.29 SCREENING FOR THYROID DISORDER: ICD-10-CM

## 2023-09-20 DIAGNOSIS — R73.01 ELEVATED FASTING BLOOD SUGAR: ICD-10-CM

## 2023-09-20 DIAGNOSIS — Z13.1 SCREENING FOR DIABETES MELLITUS: ICD-10-CM

## 2023-09-20 DIAGNOSIS — R63.4 WEIGHT LOSS: ICD-10-CM

## 2023-09-20 DIAGNOSIS — E43 SEVERE MALNUTRITION (HCC): ICD-10-CM

## 2023-09-20 DIAGNOSIS — E55.9 VITAMIN D DEFICIENCY: ICD-10-CM

## 2023-09-20 DIAGNOSIS — E78.2 MIXED HYPERLIPIDEMIA: ICD-10-CM

## 2023-09-20 DIAGNOSIS — E89.2 HISTORY OF PARATHYROIDECTOMY (HCC): ICD-10-CM

## 2023-09-20 NOTE — TELEPHONE ENCOUNTER
To FD = please call patient to schedule a MA visit with re evaluation- see DR. OMALLEY message  thanks

## 2023-09-20 NOTE — TELEPHONE ENCOUNTER
Agree with ER recommendations as we have failed outpatient management at this point. However, patient and spouse reluctant to do so. Please notify the patient that I provided fasting early morning blood work, should be obtained between 8 AM-9 AM after an overnight fast.  Can drink water as able. We are due for Medicare visit. At this point we need clinical reevaluation including a physical exam to determine next steps.   We will go over the lab results in clinic together

## 2023-09-20 NOTE — TELEPHONE ENCOUNTER
Called spouse per HIPAA and relayed DR. OMALLEY message - verbalized understanding  While trying to transfer to [facility] call got lost

## 2023-09-20 NOTE — TELEPHONE ENCOUNTER
Called and spoke to the patient's spouse, Luzmaria Wells on HALEY. Patient's /name verified. MD notes below relayed. She understands she will call the office if not effective.

## 2023-09-20 NOTE — TELEPHONE ENCOUNTER
Please advise - to DR. OMALLEY  Called spouse per HIPAA- patient has stomach pain , trouble urinating , he is constipated , itching all over body , last BM 2-3 days ago -everything has gotten worse - even when eating breakfast   Rn advised to go to ER - spouse declines \" they are not doing anything     RN again urged that if patient is getting worse he needs to go to ER. Patient verbalizes understanding that noncompliance with the medical recommendations provided could result in increased risk of morbidity or even mortality.

## 2023-09-20 NOTE — TELEPHONE ENCOUNTER
Alex Meth states current dose of Miralax hasn't helped - should patient increase dose or try something different? Please call. Thank you.

## 2023-09-20 NOTE — TELEPHONE ENCOUNTER
Wife, Kenrick Hightower, called   asking if Dr Elliott Ahuja could order labs for pt to have done soon  Wife states pt is getting worse and  Dr Elliott Ahuja will know what that [employer] Dr Elliott Ahuja is not in the office today

## 2023-09-20 NOTE — TELEPHONE ENCOUNTER
Patient wife returned call  Shared message regarding lab, they may go a bit earlier than 8:00  MA appt scheduled for 9/26 at 5:30 to review labs  Tasked to nursing as [facility]

## 2023-09-20 NOTE — TELEPHONE ENCOUNTER
Dr Aquino Quitter the patient but I spoke to his spouse Luci Strong, on HALEY. Patient's /name verified. She reported the patient is frustrated. He is constipated most of the time. He has difficulty defecating d/t hard stools, color normal.    Last BM 2 days ago. He is taking Miralax 1 capful daily and dulcolax supp every other day. He usually eats 1 meal/day, mostly whole grain pancakes, vegetables and tries his best increasing his fluids. He is mostly in bed. No n/v.I advised to add OTC stool softener. Pls advise    Thank you.

## 2023-09-20 NOTE — TELEPHONE ENCOUNTER
I would increase the dose to #2 capfuls of MiraLAX daily. Please have Mamie Peacock let us know if ineffective.

## 2023-09-21 NOTE — TELEPHONE ENCOUNTER
GI RNs: Please call the patient/his wife. I spoke to Dr. Eulalia Hardy who will be reaching out to them.

## 2023-09-22 ENCOUNTER — LAB ENCOUNTER (OUTPATIENT)
Dept: LAB | Facility: HOSPITAL | Age: 73
End: 2023-09-22
Attending: INTERNAL MEDICINE
Payer: MEDICARE

## 2023-09-22 DIAGNOSIS — L29.9 PRURITUS: ICD-10-CM

## 2023-09-22 DIAGNOSIS — R79.89 ELEVATED LFTS: ICD-10-CM

## 2023-09-22 DIAGNOSIS — E89.2 HISTORY OF PARATHYROIDECTOMY (HCC): ICD-10-CM

## 2023-09-22 DIAGNOSIS — E55.9 VITAMIN D DEFICIENCY: ICD-10-CM

## 2023-09-22 DIAGNOSIS — Z13.0 SCREENING FOR DEFICIENCY ANEMIA: ICD-10-CM

## 2023-09-22 DIAGNOSIS — E78.2 MIXED HYPERLIPIDEMIA: ICD-10-CM

## 2023-09-22 DIAGNOSIS — E43 SEVERE MALNUTRITION (HCC): ICD-10-CM

## 2023-09-22 DIAGNOSIS — R73.01 ELEVATED FASTING BLOOD SUGAR: ICD-10-CM

## 2023-09-22 DIAGNOSIS — Z13.29 SCREENING FOR THYROID DISORDER: ICD-10-CM

## 2023-09-22 DIAGNOSIS — R33.9 URINARY RETENTION: ICD-10-CM

## 2023-09-22 DIAGNOSIS — R63.4 WEIGHT LOSS: ICD-10-CM

## 2023-09-22 DIAGNOSIS — Z13.1 SCREENING FOR DIABETES MELLITUS: ICD-10-CM

## 2023-09-22 LAB
ALBUMIN SERPL-MCNC: 3.3 G/DL (ref 3.4–5)
ALBUMIN/GLOB SERPL: 1.2 {RATIO} (ref 1–2)
ALP LIVER SERPL-CCNC: 55 U/L
ALT SERPL-CCNC: 42 U/L
ANION GAP SERPL CALC-SCNC: 2 MMOL/L (ref 0–18)
AST SERPL-CCNC: 20 U/L (ref 15–37)
BASOPHILS # BLD AUTO: 0.02 X10(3) UL (ref 0–0.2)
BASOPHILS NFR BLD AUTO: 0.5 %
BILIRUB SERPL-MCNC: 0.6 MG/DL (ref 0.1–2)
BILIRUB UR QL: NEGATIVE
BUN BLD-MCNC: 14 MG/DL (ref 7–18)
BUN/CREAT SERPL: 21.9 (ref 10–20)
CALCIUM BLD-MCNC: 8.5 MG/DL (ref 8.5–10.1)
CHLORIDE SERPL-SCNC: 111 MMOL/L (ref 98–112)
CHOLEST SERPL-MCNC: 201 MG/DL (ref ?–200)
CLARITY UR: CLEAR
CO2 SERPL-SCNC: 31 MMOL/L (ref 21–32)
CORTIS SERPL-MCNC: 12.4 UG/DL
CREAT BLD-MCNC: 0.64 MG/DL
CRP SERPL-MCNC: <0.29 MG/DL (ref ?–0.3)
DEPRECATED HBV CORE AB SER IA-ACNC: 168.8 NG/ML
DEPRECATED RDW RBC AUTO: 51.8 FL (ref 35.1–46.3)
EGFRCR SERPLBLD CKD-EPI 2021: 101 ML/MIN/1.73M2 (ref 60–?)
EOSINOPHIL # BLD AUTO: 0.06 X10(3) UL (ref 0–0.7)
EOSINOPHIL NFR BLD AUTO: 1.6 %
ERYTHROCYTE [DISTWIDTH] IN BLOOD BY AUTOMATED COUNT: 13.8 % (ref 11–15)
EST. AVERAGE GLUCOSE BLD GHB EST-MCNC: 100 MG/DL (ref 68–126)
FASTING PATIENT LIPID ANSWER: YES
FASTING STATUS PATIENT QL REPORTED: YES
GLOBULIN PLAS-MCNC: 2.7 G/DL (ref 2.8–4.4)
GLUCOSE BLD-MCNC: 80 MG/DL (ref 70–99)
GLUCOSE UR-MCNC: NORMAL MG/DL
HBA1C MFR BLD: 5.1 % (ref ?–5.7)
HCT VFR BLD AUTO: 43.8 %
HDLC SERPL-MCNC: 67 MG/DL (ref 40–59)
HGB BLD-MCNC: 13.8 G/DL
HGB UR QL STRIP.AUTO: NEGATIVE
IMM GRANULOCYTES # BLD AUTO: 0.01 X10(3) UL (ref 0–1)
IMM GRANULOCYTES NFR BLD: 0.3 %
IRON SATN MFR SERPL: 27 %
IRON SERPL-MCNC: 71 UG/DL
KETONES UR-MCNC: NEGATIVE MG/DL
LDLC SERPL CALC-MCNC: 122 MG/DL (ref ?–100)
LEUKOCYTE ESTERASE UR QL STRIP.AUTO: NEGATIVE
LIPASE SERPL-CCNC: 36 U/L (ref 13–75)
LYMPHOCYTES # BLD AUTO: 1.18 X10(3) UL (ref 1–4)
LYMPHOCYTES NFR BLD AUTO: 31.6 %
MAGNESIUM SERPL-MCNC: 2.1 MG/DL (ref 1.6–2.6)
MCH RBC QN AUTO: 31.5 PG (ref 26–34)
MCHC RBC AUTO-ENTMCNC: 31.5 G/DL (ref 31–37)
MCV RBC AUTO: 100 FL
MONOCYTES # BLD AUTO: 0.3 X10(3) UL (ref 0.1–1)
MONOCYTES NFR BLD AUTO: 8 %
NEUTROPHILS # BLD AUTO: 2.17 X10 (3) UL (ref 1.5–7.7)
NEUTROPHILS # BLD AUTO: 2.17 X10(3) UL (ref 1.5–7.7)
NEUTROPHILS NFR BLD AUTO: 58 %
NITRITE UR QL STRIP.AUTO: NEGATIVE
NONHDLC SERPL-MCNC: 134 MG/DL (ref ?–130)
OSMOLALITY SERPL CALC.SUM OF ELEC: 297 MOSM/KG (ref 275–295)
PH UR: 7 [PH] (ref 5–8)
PLATELET # BLD AUTO: 177 10(3)UL (ref 150–450)
POTASSIUM SERPL-SCNC: 4 MMOL/L (ref 3.5–5.1)
PROT SERPL-MCNC: 6 G/DL (ref 6.4–8.2)
PTH-INTACT SERPL-MCNC: 50.1 PG/ML (ref 18.5–88)
RBC # BLD AUTO: 4.38 X10(6)UL
SODIUM SERPL-SCNC: 144 MMOL/L (ref 136–145)
SP GR UR STRIP: 1.02 (ref 1–1.03)
TIBC SERPL-MCNC: 264 UG/DL (ref 240–450)
TRANSFERRIN SERPL-MCNC: 177 MG/DL (ref 200–360)
TRIGL SERPL-MCNC: 66 MG/DL (ref 30–149)
TSI SER-ACNC: 2.12 MIU/ML (ref 0.36–3.74)
UROBILINOGEN UR STRIP-ACNC: NORMAL
VIT B12 SERPL-MCNC: 298 PG/ML (ref 193–986)
VIT D+METAB SERPL-MCNC: 38.2 NG/ML (ref 30–100)
VLDLC SERPL CALC-MCNC: 12 MG/DL (ref 0–30)
WBC # BLD AUTO: 3.7 X10(3) UL (ref 4–11)

## 2023-09-22 PROCEDURE — 84466 ASSAY OF TRANSFERRIN: CPT

## 2023-09-22 PROCEDURE — 82607 VITAMIN B-12: CPT

## 2023-09-22 PROCEDURE — 80061 LIPID PANEL: CPT

## 2023-09-22 PROCEDURE — 83540 ASSAY OF IRON: CPT

## 2023-09-22 PROCEDURE — 82728 ASSAY OF FERRITIN: CPT

## 2023-09-22 PROCEDURE — 82306 VITAMIN D 25 HYDROXY: CPT

## 2023-09-22 PROCEDURE — 83970 ASSAY OF PARATHORMONE: CPT

## 2023-09-22 PROCEDURE — 83735 ASSAY OF MAGNESIUM: CPT

## 2023-09-22 PROCEDURE — 36415 COLL VENOUS BLD VENIPUNCTURE: CPT

## 2023-09-22 PROCEDURE — 82533 TOTAL CORTISOL: CPT

## 2023-09-22 PROCEDURE — 80053 COMPREHEN METABOLIC PANEL: CPT

## 2023-09-22 PROCEDURE — 83036 HEMOGLOBIN GLYCOSYLATED A1C: CPT

## 2023-09-22 PROCEDURE — 83690 ASSAY OF LIPASE: CPT

## 2023-09-22 PROCEDURE — 81001 URINALYSIS AUTO W/SCOPE: CPT

## 2023-09-22 PROCEDURE — 86140 C-REACTIVE PROTEIN: CPT

## 2023-09-22 PROCEDURE — 84443 ASSAY THYROID STIM HORMONE: CPT

## 2023-09-22 PROCEDURE — 85025 COMPLETE CBC W/AUTO DIFF WBC: CPT

## 2023-09-22 NOTE — TELEPHONE ENCOUNTER
Called the patient's wife, Vera Valdovinos on [employer]. Patient's /name verified. MD message relayed. She stated,\"ok\" I will wait for Dr Sevilla's input.

## 2023-09-22 NOTE — TELEPHONE ENCOUNTER
The patient's labs today are normal and do not explain a cause for the abdominal pain. Likewise a recent CT scan was unrevealing. I would await Dr. Sanjay Suero recommendation.

## 2023-09-26 NOTE — TELEPHONE ENCOUNTER
I addended my note of 12.   Please send this note as a fax to the neurologist so he can evaluate this patient for chronic tinnitus of both ears General

## 2023-09-27 ENCOUNTER — TELEPHONE (OUTPATIENT)
Dept: INTERNAL MEDICINE CLINIC | Facility: CLINIC | Age: 73
End: 2023-09-27

## 2023-09-27 NOTE — TELEPHONE ENCOUNTER
Warner from [facility] to inform physician - patient's wife is requesting home health services be discontinued. Patient and wife state home health is not following their Jain beliefs.  # [phone], confidential voicemail. FYI. No call back needed.

## 2023-10-17 ENCOUNTER — TELEPHONE (OUTPATIENT)
Facility: CLINIC | Age: 73
End: 2023-10-17

## 2023-10-17 NOTE — TELEPHONE ENCOUNTER
Dr Montemayor Heading and spoke to the patient's spouse, Maia Ramirez (on [employer]), patient's /name verified. She reported the patient is\" suffering\" from constipation. She feels this is not mentally related. He is c/o generalized abdominal pain when eating. Denies vomiting. Not taking opiates. His stools are very hard, the patient has to mash using the paint stick so stools can get flushed in the toilet. Last BM 2 days ago. He is taking senokot 2 tabs daily, supp daily and miralax 2 capfuls daily. He is eating wheat pancakes and vegetables in the morning. Drinks 4 16 oz of water daily which I advised to increase if not contraindicated. She spoke to Dr Cecilia Roger, new Rx for depression.     Please advise    Thank you

## 2023-10-17 NOTE — TELEPHONE ENCOUNTER
Pt wife calling, states medication is not helping with constipation  Pt has not had a BM on his own in weeks. Pt complains of Abdominal pain and pain after eater.    Please call

## 2023-10-17 NOTE — TELEPHONE ENCOUNTER
I spoke to Toño Sweet. Maria Fernanda Buck continues to have difficulty evacuating stool. He has bowel movements every other day only with the aid of a suppository. He also has postprandial abdominal pain limiting his ability to eat. Urination is also an issue. I have discussed options as follows as the patient is not a candidate for pelvic floor therapy:    1. Bowel washout with GoLytely. 2.  Trial of linaclotide (Linzess). 3.  A combination of the above. Toño Sweet will speak with Maria Fernanda Buck, research the [address] [place] [address] and contact us with a decision tomorrow.

## 2023-10-18 NOTE — TELEPHONE ENCOUNTER
Pt wife is calling back. Pt would like an Rx for GoLDestineerly to be sent to [facility]. Pt states that he doesn't want the one that you have to sip.   Please call

## 2023-10-18 NOTE — TELEPHONE ENCOUNTER
Called and spoke to the patient's wife, Lashawn Savage (on [employer]), patient's /name verified. Colon washout instructions reviewed. Pat verbalized understanding of instructions given, she will call the office if she has additional questions.

## 2023-10-19 ENCOUNTER — HOSPITAL ENCOUNTER (EMERGENCY)
Facility: HOSPITAL | Age: 73
Discharge: HOME | End: 2023-10-19
Attending: EMERGENCY MEDICINE
Payer: MEDICARE

## 2023-10-19 VITALS
BODY MASS INDEX: 15.16 KG/M2 | DIASTOLIC BLOOD PRESSURE: 82 MMHG | WEIGHT: 100 LBS | SYSTOLIC BLOOD PRESSURE: 117 MMHG | TEMPERATURE: 98 F | OXYGEN SATURATION: 99 % | HEIGHT: 68 IN | RESPIRATION RATE: 16 BRPM | HEART RATE: 57 BPM

## 2023-10-19 DIAGNOSIS — K59.00 CONSTIPATION, UNSPECIFIED CONSTIPATION TYPE: Primary | ICD-10-CM

## 2023-10-19 LAB
ALBUMIN SERPL-MCNC: 4.2 G/DL (ref 3.4–5)
ALBUMIN/GLOB SERPL: 1.3 {RATIO} (ref 1–2)
ALP LIVER SERPL-CCNC: 54 U/L
ALT SERPL-CCNC: 46 U/L
ANION GAP SERPL CALC-SCNC: 6 MMOL/L (ref 0–18)
AST SERPL-CCNC: 45 U/L (ref 15–37)
BASOPHILS # BLD AUTO: 0.02 X10(3) UL (ref 0–0.2)
BASOPHILS NFR BLD AUTO: 0.3 %
BILIRUB SERPL-MCNC: 0.9 MG/DL (ref 0.1–2)
BUN BLD-MCNC: 11 MG/DL (ref 7–18)
BUN/CREAT SERPL: 12.8 (ref 10–20)
CALCIUM BLD-MCNC: 9.3 MG/DL (ref 8.5–10.1)
CHLORIDE SERPL-SCNC: 104 MMOL/L (ref 98–112)
CO2 SERPL-SCNC: 31 MMOL/L (ref 21–32)
CREAT BLD-MCNC: 0.86 MG/DL
DEPRECATED RDW RBC AUTO: 48.1 FL (ref 35.1–46.3)
EGFRCR SERPLBLD CKD-EPI 2021: 92 ML/MIN/1.73M2 (ref 60–?)
EOSINOPHIL # BLD AUTO: 0.06 X10(3) UL (ref 0–0.7)
EOSINOPHIL NFR BLD AUTO: 1 %
ERYTHROCYTE [DISTWIDTH] IN BLOOD BY AUTOMATED COUNT: 13.5 % (ref 11–15)
GLOBULIN PLAS-MCNC: 3.2 G/DL (ref 2.8–4.4)
GLUCOSE BLD-MCNC: 97 MG/DL (ref 70–99)
HCT VFR BLD AUTO: 50.9 %
HGB BLD-MCNC: 16.6 G/DL
IMM GRANULOCYTES # BLD AUTO: 0.01 X10(3) UL (ref 0–1)
IMM GRANULOCYTES NFR BLD: 0.2 %
LYMPHOCYTES # BLD AUTO: 1.8 X10(3) UL (ref 1–4)
LYMPHOCYTES NFR BLD AUTO: 29.4 %
MCH RBC QN AUTO: 31.3 PG (ref 26–34)
MCHC RBC AUTO-ENTMCNC: 32.6 G/DL (ref 31–37)
MCV RBC AUTO: 95.9 FL
MONOCYTES # BLD AUTO: 0.44 X10(3) UL (ref 0.1–1)
MONOCYTES NFR BLD AUTO: 7.2 %
NEUTROPHILS # BLD AUTO: 3.8 X10 (3) UL (ref 1.5–7.7)
NEUTROPHILS # BLD AUTO: 3.8 X10(3) UL (ref 1.5–7.7)
NEUTROPHILS NFR BLD AUTO: 61.9 %
OSMOLALITY SERPL CALC.SUM OF ELEC: 291 MOSM/KG (ref 275–295)
PLATELET # BLD AUTO: 143 10(3)UL (ref 150–450)
POTASSIUM SERPL-SCNC: 4.3 MMOL/L (ref 3.5–5.1)
PROT SERPL-MCNC: 7.4 G/DL (ref 6.4–8.2)
RBC # BLD AUTO: 5.31 X10(6)UL
SODIUM SERPL-SCNC: 141 MMOL/L (ref 136–145)
WBC # BLD AUTO: 6.1 X10(3) UL (ref 4–11)

## 2023-10-19 PROCEDURE — 99283 EMERGENCY DEPT VISIT LOW MDM: CPT

## 2023-10-19 PROCEDURE — 36415 COLL VENOUS BLD VENIPUNCTURE: CPT

## 2023-10-19 PROCEDURE — 80053 COMPREHEN METABOLIC PANEL: CPT | Performed by: EMERGENCY MEDICINE

## 2023-10-19 PROCEDURE — 85025 COMPLETE CBC W/AUTO DIFF WBC: CPT | Performed by: EMERGENCY MEDICINE

## 2023-10-19 NOTE — TELEPHONE ENCOUNTER
I would pause for an hour. I would utilize a suppository now. May resume the remaining prep and 1 hour if no results. If the patient has severe abdominal pain, bloating or vomiting he should present to the ED. He should stay on liquids until the bowel washout begins working.

## 2023-10-19 NOTE — TELEPHONE ENCOUNTER
I spoke with wife Luci Strong. She states patient started doing his colon wash out about 2 1/2 hours ago with no results. Patient drank 15 glasses and has 4 more to go. Denies abdominal pain,nausea or vomiting but continues to have bloating. Wife asking if he should finish the 4 glasses he has left or advise on next step. Please advise. Thank you.

## 2023-10-19 NOTE — ED INITIAL ASSESSMENT (HPI)
Pt having bowel issues x 5 months- constipation. Took bowel prep 3 hours ago, c/o lower abdominal pain. Denies vomiting, fever, diarrhea.

## 2023-10-19 NOTE — TELEPHONE ENCOUNTER
Wife states no bowel movement after taking the suppository. Patient now has developed severe abdominal pain and bloating. I advise patient to go to the ED as instructed by Dr. Dean Boss. Wife voiced understanding.

## 2023-10-19 NOTE — TELEPHONE ENCOUNTER
I spoke with wife Pat, verified and message from Dr. Ace Soto given. She voiced understanding. No further questions at this time.

## 2023-10-20 ENCOUNTER — PATIENT OUTREACH (OUTPATIENT)
Dept: CASE MANAGEMENT | Age: 73
End: 2023-10-20

## 2023-10-20 NOTE — PROGRESS NOTES
1st attempt ER f/up apt request  PCP -decline, pt wife stated pt doesn't need apt  [facility] -decline, pt wife stated she has already been in touch w/ office  Closing encounter

## 2023-10-24 NOTE — PROGRESS NOTES
Can we reach out to the patient. How is he feeling after the bowel washout? Does he wish to try Linzess?

## 2023-10-25 ENCOUNTER — TELEPHONE (OUTPATIENT)
Facility: CLINIC | Age: 73
End: 2023-10-25

## 2023-10-25 DIAGNOSIS — K59.00 CONSTIPATION, UNSPECIFIED CONSTIPATION TYPE: Primary | ICD-10-CM

## 2023-10-25 DIAGNOSIS — K59.00 CONSTIPATION, UNSPECIFIED CONSTIPATION TYPE: ICD-10-CM

## 2023-10-25 RX ORDER — LACTULOSE 10 G/15ML
20 SOLUTION ORAL NIGHTLY
Qty: 900 ML | Refills: 0 | Status: SHIPPED | OUTPATIENT
Start: 2023-10-25 | End: 2023-10-26

## 2023-10-25 NOTE — TELEPHONE ENCOUNTER
Called back the patient's spouse, Alirio Chappell. She identified the patient's /name. APN message communicated. Darlene Cordero to take the patient to ED if he is vomiting, unable to keep food down, w/ sever abdominal pain. Alirio Chappell also understood she will notify the office if the medication is not effective .

## 2023-10-25 NOTE — TELEPHONE ENCOUNTER
Thank you for the update. Silvano Walker can try lactulose instead. He would take 20g daily. We can titrate this up and down as needed based on his response. The main side effect of this medication long term use is electrolyte imbalance. I have sent the medication to his pharmacy. Please advise Winsome & Silvano Walker to keep us updated on if this helps him.      FLORESITA Reese

## 2023-10-25 NOTE — TELEPHONE ENCOUNTER
Yes, he can continue. Lactulose can make you feel bloated. But your bloating is probably from constipation, so I would try it and hopefully it will improve.      FLORESITA Hernandez

## 2023-10-25 NOTE — TELEPHONE ENCOUNTER
Author: Filippo Recinos MD Service: -- Author Type: Physician   Filed: 10/24/2023  5:46 PM Date of Service: 10/24/2023  5:46 PM Status: Signed   : Filippo Recinos MD (Physician)     Can we reach out to the patient. How is he feeling after the bowel washout? Does he wish to try Linzess?

## 2023-10-25 NOTE — TELEPHONE ENCOUNTER
Dr Lobo/ Selma Meyers and spoke to the patient's wife, Lashawn Savage (on [employer]), patient /name verified. As per Lashawn Savage, the bowel wash out \"did not make a difference. \" Completed on 10/19/2023  In fact she gave 2 fleets enema , the patient had liquid stools. Today, she gave 2 suppositories,, no results. She also looked into [employer] does not want the patient on it, \"too many side effects\". She relates the patient has good appetite, no vomiting. Abdomen slightly distended, passing gas. She stated, she will have the patient continue drinking more fluids.     Thank you

## 2023-10-25 NOTE — TELEPHONE ENCOUNTER
Rogers Chan    I spoke to the patient and his spouse, /name verified. She was notified of lactulose Rx. The patient is c/o bloating. Should he continue w/ lactulose?     Thank you

## 2023-10-26 RX ORDER — LACTULOSE 10 G/15ML
20 SOLUTION ORAL NIGHTLY
Qty: 2700 ML | Refills: 0 | Status: SHIPPED | OUTPATIENT
Start: 2023-10-26 | End: 2024-01-24

## 2023-10-26 NOTE — TELEPHONE ENCOUNTER
Requested Prescriptions     Pending Prescriptions Disp Refills    LACTULOSE 10 GM/15ML Oral Solution [Pharmacy Med Name: LACTULOSE 10GM/15ML SOLUTION]  0     Sig: TAKE 30 ML( 20 GM TOTAL) BY MOUTH NIGHTLY     LOV:   7/31/23          LR:  10/25/23

## 2023-11-10 ENCOUNTER — TELEPHONE (OUTPATIENT)
Facility: CLINIC | Age: 73
End: 2023-11-10

## 2023-11-10 NOTE — TELEPHONE ENCOUNTER
Dr Howard Gastelum and spoke to the patient's spouse, patient /name verified. She stated  the patient wants to eat and he eats a large portion at breakfast but he still feels constipated. As per Ibis Shallow, the patient gets supp daily and fleets enema prn. He did not take lactulose at all.     She feels he needs to be seen by MD.    Your Appointments      2023  3:00 PM  Follow Up Visit with Mg Dang MD  [address], [address] [facility],[address], [facility]) [address] [facility],[address]  [phone]

## 2023-11-22 ENCOUNTER — TELEPHONE (OUTPATIENT)
Facility: CLINIC | Age: 73
End: 2023-11-22

## 2023-11-22 ENCOUNTER — TELEPHONE (OUTPATIENT)
Dept: INTERNAL MEDICINE CLINIC | Facility: CLINIC | Age: 73
End: 2023-11-22

## 2023-11-22 DIAGNOSIS — H93.13 TINNITUS OF BOTH EARS: ICD-10-CM

## 2023-11-22 DIAGNOSIS — E43 SEVERE MALNUTRITION (HCC): ICD-10-CM

## 2023-11-22 DIAGNOSIS — L29.9 SEVERE PRURITUS: Primary | ICD-10-CM

## 2023-11-22 NOTE — TELEPHONE ENCOUNTER
Dr Bk Chiu and spoke to the patient's spouse, Juliet Salazar (on [employer]). I notified her that you will be back on Friday. Please see message below.     Thank you

## 2023-11-22 NOTE — TELEPHONE ENCOUNTER
Pat requesting to speak with Dr Micha Hardy about admitting patient to Hospice at home. Please call - aware Dr Micha Hardy is not in office. Thank you.

## 2023-11-22 NOTE — TELEPHONE ENCOUNTER
We can ask residential to assess. I am unsure if he has a qualifying diagnosis for hospice.   But okay to fax over a request

## 2023-11-22 NOTE — TELEPHONE ENCOUNTER
To Dr. Cat Mccauley to advise    Spoke to spouse Ethel Higgins (ok per HIPAA). Reports pt is in bed about 23 hours per day. States itching is getting worse and worse. Difficulty having BM's. Pat having to give pt an enema every 2 days. Refuses to eat at times. States he is very skinny. Ethel Higgins was very tearful on the phone. Wanting to start home hospice.      Residential hospice referral pended

## 2023-11-22 NOTE — TELEPHONE ENCOUNTER
Please call patient spouse, Patrick Sethi  Requesting orders for hospice  Please call to discuss/advise  Tasked to nursing

## 2023-11-25 NOTE — TELEPHONE ENCOUNTER
I will be back in the office on Monday. Discussion regarding hospice should be carried out by the patient's PCP. The patient does not have a terminal GI condition.

## 2023-11-27 NOTE — TELEPHONE ENCOUNTER
Called and spoke to the patient's spouse, Kenrick Hightower (on [employer]). Patient /name verified. MD message relayed. As per Kenrick Hightower, she also called Dr Elliott Ahuja office, she is still awaiting for them to call her back.

## 2023-11-29 NOTE — TELEPHONE ENCOUNTER
Faxed [facility] orders per MD verbal request. [facility] will determine if he qualifies for Hospice services.

## 2023-12-19 ENCOUNTER — TELEPHONE (OUTPATIENT)
Dept: INTERNAL MEDICINE CLINIC | Facility: CLINIC | Age: 73
End: 2023-12-19

## 2023-12-19 DIAGNOSIS — E43 SEVERE MALNUTRITION (HCC): ICD-10-CM

## 2023-12-19 DIAGNOSIS — H93.13 TINNITUS OF BOTH EARS: ICD-10-CM

## 2023-12-19 DIAGNOSIS — L29.9 SEVERE PRURITUS: ICD-10-CM

## 2023-12-19 DIAGNOSIS — R63.4 UNINTENTIONAL WEIGHT LOSS: Primary | ICD-10-CM

## 2023-12-19 NOTE — TELEPHONE ENCOUNTER
Yuval Morrell from [facility] called stating the pt's wife Donald Blum called her to initiate hospice care. Wife wants to get this started as soon as possible. Yuval Morrell is looking for orders from Dr. Donte Garces along with an H&P. Please fax all to [phone]. If Dr. Amanda Caban would like to speak with Luis's wife, she can be reached at [phone].

## 2023-12-20 NOTE — TELEPHONE ENCOUNTER
Order for hospice and note from virtual visit 6/30/23 faxed to [facility] at [phone]-confirmation recieved

## 2023-12-20 NOTE — TELEPHONE ENCOUNTER
Wife called     [facility] did not received the fax    Please re send to fax# [phone]

## 2023-12-22 NOTE — TELEPHONE ENCOUNTER
Spoke with Loreta Coughlin - the itching is unbearable, less than 100 lbs. He can only eat 1 meal a day, then his stomach hurts. He is having problems with bowel movements with enemas/suppositories. Jian Dougherty RN did come to evaluate. He feels like there is still life in him. Given the complexity of the situation, there will be a visiting doctor from the hospice team to evaluate. It seems that he has declined further with weight loss, inability to take in adequate oral intake. It has been hard for Pat to see him decline without any definitive diagnosis, intolerance of multiple treatments tried in the past.  Reports that he is too weak to even transport to appointments and may require ambulances for transport anywhere. I advised that they keep me updated on how the evaluation goes and any other treatment/plans after this evaluation. They are aware that hospice is end-of-life, comfort measures.

## 2024-01-02 ENCOUNTER — TELEPHONE (OUTPATIENT)
Dept: INTERNAL MEDICINE CLINIC | Facility: CLINIC | Age: 74
End: 2024-01-02

## 2024-01-02 DIAGNOSIS — R79.89 ELEVATED LFTS: ICD-10-CM

## 2024-01-02 DIAGNOSIS — E61.1 IRON DEFICIENCY: ICD-10-CM

## 2024-01-02 DIAGNOSIS — K52.9 CHRONIC DIARRHEA: ICD-10-CM

## 2024-01-02 DIAGNOSIS — R63.4 UNINTENTIONAL WEIGHT LOSS: Primary | ICD-10-CM

## 2024-01-02 DIAGNOSIS — C25.1 MALIGNANT NEOPLASM OF BODY OF PANCREAS (HCC): ICD-10-CM

## 2024-01-02 DIAGNOSIS — C80.1 MALIGNANCY (HCC): ICD-10-CM

## 2024-01-02 DIAGNOSIS — L29.9 SEVERE PRURITUS: ICD-10-CM

## 2024-01-02 NOTE — TELEPHONE ENCOUNTER
Jonnys wife Pat called. Wants to talk to Dr Jeronimo about things going on with Don. She walso wants to know if a test was done for Pancreatic Cancer test.    #[phone]

## 2024-01-02 NOTE — TELEPHONE ENCOUNTER
Will call when back in the office. In the meantime, there are blood tests that we can check which are nonspecific tumor markers. Very unlikely there is a cancerous process (which is good) as it would have been seen on the scans.    (If established with HH, can ask HH to draw labs)

## 2024-01-03 NOTE — TELEPHONE ENCOUNTER
Called patients wife she is going to try to get more information on the blood test and call us back.

## 2024-01-03 NOTE — TELEPHONE ENCOUNTER
I am unfamiliar with this specific testing.  Please see previous telephone encounter, I do have the labs available at [facility], but she will have to determine the specific testing and how best to order it (not sure if this is genetic testing?)

## 2024-01-03 NOTE — TELEPHONE ENCOUNTER
Pt's wife called back - Requests orders for pancreatic cancer test & Galleri multi cancer early dectection blood test  She doesn't think pt was ever tested for pancreatic cancer     Pat would still like to speak with Dr Jeronimo she is hoping pt can get the tests done on Friday

## 2024-01-16 ENCOUNTER — TELEPHONE (OUTPATIENT)
Dept: INTERNAL MEDICINE CLINIC | Facility: CLINIC | Age: 74
End: 2024-01-16

## 2024-01-16 DIAGNOSIS — M33.93: ICD-10-CM

## 2024-01-16 DIAGNOSIS — F41.9 ANXIETY: ICD-10-CM

## 2024-01-16 DIAGNOSIS — E78.2 MIXED HYPERLIPIDEMIA: ICD-10-CM

## 2024-01-16 DIAGNOSIS — R53.81 PHYSICAL DECONDITIONING: ICD-10-CM

## 2024-01-16 DIAGNOSIS — L29.9 SEVERE PRURITUS: ICD-10-CM

## 2024-01-16 DIAGNOSIS — R63.4 WEIGHT LOSS, UNINTENTIONAL: ICD-10-CM

## 2024-01-16 DIAGNOSIS — R30.0 DYSURIA: ICD-10-CM

## 2024-01-16 DIAGNOSIS — E03.8 SUBCLINICAL HYPOTHYROIDISM: ICD-10-CM

## 2024-01-16 DIAGNOSIS — E61.1 IRON DEFICIENCY: ICD-10-CM

## 2024-01-16 DIAGNOSIS — L29.9 PRURITIC DISORDER: ICD-10-CM

## 2024-01-16 DIAGNOSIS — K52.9 CHRONIC DIARRHEA: ICD-10-CM

## 2024-01-16 DIAGNOSIS — E53.8 VITAMIN B12 DEFICIENCY: ICD-10-CM

## 2024-01-16 DIAGNOSIS — E55.9 VITAMIN D DEFICIENCY: ICD-10-CM

## 2024-01-16 DIAGNOSIS — H93.13 TINNITUS OF BOTH EARS: ICD-10-CM

## 2024-01-16 DIAGNOSIS — F45.8 NEUROGENIC PRURITUS: ICD-10-CM

## 2024-01-16 DIAGNOSIS — C80.1 MALIGNANCY (HCC): ICD-10-CM

## 2024-01-16 DIAGNOSIS — R79.89 ELEVATED LFTS: Primary | ICD-10-CM

## 2024-01-16 NOTE — TELEPHONE ENCOUNTER
Patients wife if calling and is very upset.  Wife states that Don is very weak.  Pat the wife is asking if the patient could have labs done to check for any infection such as UTI or anything else.  Wife is also asking for the patients kidney and liver function to be tested.    Patients wife would really like to speak Directly to Dr Jeronimo    Please call Purnima at [phone]

## 2024-01-17 RX ORDER — CLONAZEPAM 1 MG/1
1 TABLET ORAL 2 TIMES DAILY PRN
Qty: 60 TABLET | Refills: 0 | Status: SHIPPED | OUTPATIENT
Start: 2024-01-17 | End: 2024-01-17

## 2024-01-17 NOTE — TELEPHONE ENCOUNTER
Please notify the patient's spouse Pat, as the patient is currently receiving lorazepam from his psychiatrist we are unable to provide the clonazepam.  Should discuss with Dr. Sevilla if appropriate.  We should then cancel our clonazepam order that I placed to the pharmacy.

## 2024-01-17 NOTE — TELEPHONE ENCOUNTER
To Dr. OMALLEY     Please clarify-     [place]  receiving lorazepam 1 mg from Dr. Will Sevilla- last dispensed 12.29.2023

## 2024-01-17 NOTE — TELEPHONE ENCOUNTER
Pharmacy called to clarify the Clonazepam RX.  She stated the pt. Gets a large quantity of Lorazepam from another prescriber.

## 2024-01-17 NOTE — TELEPHONE ENCOUNTER
I did review the most recent workup for the patient.  Unfortunately we do not have a diagnosis.  Understandable that patient and wife frustrated.  However has gone extensive workup, and unclear if tinnitus, pruritus is idiopathic, psychogenic.    Recall that we have suggested multiple treatment methods for which the patient had declined its use given intolerable side effects or, he did experience negative side effects for which she could not continue the medication persistently-naltrexone for example    Again reiterated that low suspicion for pancreatic cancer, should look into the blood test that was initially postulated    Strongly encouraged that the patient make an appointment with me for reevaluation.    Spoke with Pat - Don is getting worse. In order for him to eat, he needs to have an enema every day. He stops urinating if he does not have this enema. He has had pain without having the ability to urinate. Under 100 lbs, feeling sick. He does not feel right. May have a bladder infection.     Lorazepam - he does have some relief within two pruritus and tinnitus. Helps with sleep. Has not been helpful more recently. He does take it twice. One dose in the morning and one dose in the afternoon    No longer taking the salves and creams. Has not seen his eye doctor.    No body had assess him with hospice - [facility]. Discussed that he is likely not a candidate (which is good as this implies end of life trajectory which he does not seem to be in right now)    His symptoms have intensified    She is afraid to even go in and have him come from his weakness.     Recommendations  - Given the progression of symptoms, development of gastrointestinal and genitourinary symptoms, we should conduct a full reassessment.  - Ideally should come in for physical examination and evaluation  - We should also proceed with workup for which we will check lab tests, urine tests.  We will also include autoimmune workup given the  unusual presentation and so far unremarkable workup.  - Switch from lorazepam to clonazepam.  1 mg twice a day as needed.  Longer duration of medications may be of benefit for his tinnitus/pruritus/and certainly anxiety component.  We did discuss red flag signs and symptoms such as oversedation, drowsiness, sleepiness.  Should not mix the medications, as clonazepam can have longer acting effects.  Should wait at least 10-12 hours if clonaze Purnima is taken prior to second dose, possibly switching back to lorazepam if patient is not able to tolerate.

## 2024-01-19 NOTE — TELEPHONE ENCOUNTER
Correct yes, should find an in network service that can come to the home.  I believe we were previously established with home health but currently not enrolled with residential at this time.

## 2024-01-19 NOTE — TELEPHONE ENCOUNTER
Called patient ,spoke with spouse and explained that she needs to find and in network phlebotomy place that comes to the home - verbalized understanding

## 2024-01-19 NOTE — TELEPHONE ENCOUNTER
Patient spouse called  Patient is too weak to leave the house for labs  Can someone come to their home for labs?  Please call to advise  Tasked to nursing

## 2024-01-19 NOTE — TELEPHONE ENCOUNTER
Please advise -there are different phlebotomy places which go to homes - I think they need to find out which one and price?? -to

## 2024-01-23 ENCOUNTER — LAB ENCOUNTER (OUTPATIENT)
Dept: LAB | Facility: HOSPITAL | Age: 74
End: 2024-01-23
Attending: INTERNAL MEDICINE
Payer: MEDICARE

## 2024-01-23 ENCOUNTER — OFFICE VISIT (OUTPATIENT)
Dept: OTOLARYNGOLOGY | Facility: CLINIC | Age: 74
End: 2024-01-23
Payer: MEDICARE

## 2024-01-23 DIAGNOSIS — E55.9 VITAMIN D DEFICIENCY: ICD-10-CM

## 2024-01-23 DIAGNOSIS — M33.93: ICD-10-CM

## 2024-01-23 DIAGNOSIS — R30.0 DYSURIA: ICD-10-CM

## 2024-01-23 DIAGNOSIS — R79.89 ELEVATED LFTS: ICD-10-CM

## 2024-01-23 DIAGNOSIS — R63.4 WEIGHT LOSS, UNINTENTIONAL: ICD-10-CM

## 2024-01-23 DIAGNOSIS — H61.23 BILATERAL IMPACTED CERUMEN: Primary | ICD-10-CM

## 2024-01-23 DIAGNOSIS — R63.4 UNINTENTIONAL WEIGHT LOSS: ICD-10-CM

## 2024-01-23 DIAGNOSIS — E61.1 IRON DEFICIENCY: ICD-10-CM

## 2024-01-23 DIAGNOSIS — F45.8 NEUROGENIC PRURITUS: ICD-10-CM

## 2024-01-23 DIAGNOSIS — K52.9 CHRONIC DIARRHEA: ICD-10-CM

## 2024-01-23 DIAGNOSIS — E53.8 VITAMIN B12 DEFICIENCY: ICD-10-CM

## 2024-01-23 DIAGNOSIS — H93.13 TINNITUS OF BOTH EARS: ICD-10-CM

## 2024-01-23 DIAGNOSIS — L29.9 PRURITIC DISORDER: ICD-10-CM

## 2024-01-23 DIAGNOSIS — E03.8 SUBCLINICAL HYPOTHYROIDISM: ICD-10-CM

## 2024-01-23 DIAGNOSIS — C80.1 MALIGNANCY (HCC): ICD-10-CM

## 2024-01-23 DIAGNOSIS — L29.9 SEVERE PRURITUS: ICD-10-CM

## 2024-01-23 LAB
ALBUMIN SERPL-MCNC: 3.9 G/DL (ref 3.2–4.8)
ALBUMIN/GLOB SERPL: 2 {RATIO} (ref 1–2)
ALP LIVER SERPL-CCNC: 51 U/L
ALT SERPL-CCNC: 77 U/L
ANION GAP SERPL CALC-SCNC: 6 MMOL/L (ref 0–18)
AST SERPL-CCNC: 46 U/L (ref ?–34)
BASOPHILS # BLD AUTO: 0.03 X10(3) UL (ref 0–0.2)
BASOPHILS NFR BLD AUTO: 0.8 %
BILIRUB SERPL-MCNC: 0.9 MG/DL (ref 0.2–1.1)
BILIRUB UR QL: NEGATIVE
BUN BLD-MCNC: 15 MG/DL (ref 9–23)
BUN/CREAT SERPL: 21.7 (ref 10–20)
CALCIUM BLD-MCNC: 9.3 MG/DL (ref 8.7–10.4)
CANCER AG19-9 SERPL-ACNC: 15.8 U/ML (ref ?–35)
CEA SERPL-MCNC: 1.2 NG/ML (ref ?–5)
CHLORIDE SERPL-SCNC: 104 MMOL/L (ref 98–112)
CLARITY UR: CLEAR
CO2 SERPL-SCNC: 32 MMOL/L (ref 21–32)
COLOR UR: YELLOW
CREAT BLD-MCNC: 0.69 MG/DL
CRP SERPL-MCNC: <0.4 MG/DL (ref ?–1)
DEPRECATED HBV CORE AB SER IA-ACNC: 310.1 NG/ML
DEPRECATED RDW RBC AUTO: 52.9 FL (ref 35.1–46.3)
EGFRCR SERPLBLD CKD-EPI 2021: 98 ML/MIN/1.73M2 (ref 60–?)
EOSINOPHIL # BLD AUTO: 0.02 X10(3) UL (ref 0–0.7)
EOSINOPHIL NFR BLD AUTO: 0.5 %
ERYTHROCYTE [DISTWIDTH] IN BLOOD BY AUTOMATED COUNT: 14.6 % (ref 11–15)
FASTING STATUS PATIENT QL REPORTED: YES
GLOBULIN PLAS-MCNC: 2 G/DL (ref 2.8–4.4)
GLUCOSE BLD-MCNC: 80 MG/DL (ref 70–99)
GLUCOSE UR-MCNC: NORMAL MG/DL
HCT VFR BLD AUTO: 45.9 %
HGB BLD-MCNC: 15.4 G/DL
HGB UR QL STRIP.AUTO: NEGATIVE
IGA SERPL-MCNC: 204.4 MG/DL (ref 40–350)
IGM SERPL-MCNC: 49.5 MG/DL (ref 50–300)
IMM GRANULOCYTES # BLD AUTO: 0.01 X10(3) UL (ref 0–1)
IMM GRANULOCYTES NFR BLD: 0.3 %
IMMUNOGLOBULIN PNL SER-MCNC: 670 MG/DL (ref 650–1600)
IRON SATN MFR SERPL: 51 %
IRON SERPL-MCNC: 112 UG/DL
KETONES UR-MCNC: NEGATIVE MG/DL
LEUKOCYTE ESTERASE UR QL STRIP.AUTO: NEGATIVE
LYMPHOCYTES # BLD AUTO: 1.42 X10(3) UL (ref 1–4)
LYMPHOCYTES NFR BLD AUTO: 37.5 %
MCH RBC QN AUTO: 32.8 PG (ref 26–34)
MCHC RBC AUTO-ENTMCNC: 33.6 G/DL (ref 31–37)
MCV RBC AUTO: 97.7 FL
MONOCYTES # BLD AUTO: 0.3 X10(3) UL (ref 0.1–1)
MONOCYTES NFR BLD AUTO: 7.9 %
NEUTROPHILS # BLD AUTO: 2.01 X10 (3) UL (ref 1.5–7.7)
NEUTROPHILS # BLD AUTO: 2.01 X10(3) UL (ref 1.5–7.7)
NEUTROPHILS NFR BLD AUTO: 53 %
NITRITE UR QL STRIP.AUTO: NEGATIVE
OSMOLALITY SERPL CALC.SUM OF ELEC: 294 MOSM/KG (ref 275–295)
PH UR: 6.5 [PH] (ref 5–8)
PLATELET # BLD AUTO: 185 10(3)UL (ref 150–450)
POTASSIUM SERPL-SCNC: 3.9 MMOL/L (ref 3.5–5.1)
PROT SERPL-MCNC: 5.9 G/DL (ref 5.7–8.2)
RBC # BLD AUTO: 4.7 X10(6)UL
SODIUM SERPL-SCNC: 142 MMOL/L (ref 136–145)
SP GR UR STRIP: 1.02 (ref 1–1.03)
TIBC SERPL-MCNC: 221 UG/DL (ref 250–425)
TRANSFERRIN SERPL-MCNC: 148 MG/DL (ref 215–365)
TSI SER-ACNC: 3.53 MIU/ML (ref 0.55–4.78)
UROBILINOGEN UR STRIP-ACNC: NORMAL
VIT B12 SERPL-MCNC: 333 PG/ML (ref 211–911)
VIT D+METAB SERPL-MCNC: 30.7 NG/ML (ref 30–100)
WBC # BLD AUTO: 3.8 X10(3) UL (ref 4–11)

## 2024-01-23 PROCEDURE — 83521 IG LIGHT CHAINS FREE EACH: CPT

## 2024-01-23 PROCEDURE — 80053 COMPREHEN METABOLIC PANEL: CPT

## 2024-01-23 PROCEDURE — 86235 NUCLEAR ANTIGEN ANTIBODY: CPT

## 2024-01-23 PROCEDURE — 83540 ASSAY OF IRON: CPT

## 2024-01-23 PROCEDURE — 82607 VITAMIN B-12: CPT

## 2024-01-23 PROCEDURE — 82306 VITAMIN D 25 HYDROXY: CPT

## 2024-01-23 PROCEDURE — 83516 IMMUNOASSAY NONANTIBODY: CPT

## 2024-01-23 PROCEDURE — 84466 ASSAY OF TRANSFERRIN: CPT

## 2024-01-23 PROCEDURE — 82378 CARCINOEMBRYONIC ANTIGEN: CPT

## 2024-01-23 PROCEDURE — 85025 COMPLETE CBC W/AUTO DIFF WBC: CPT

## 2024-01-23 PROCEDURE — 86301 IMMUNOASSAY TUMOR CA 19-9: CPT

## 2024-01-23 PROCEDURE — 86140 C-REACTIVE PROTEIN: CPT

## 2024-01-23 PROCEDURE — 81003 URINALYSIS AUTO W/O SCOPE: CPT

## 2024-01-23 PROCEDURE — 36415 COLL VENOUS BLD VENIPUNCTURE: CPT

## 2024-01-23 PROCEDURE — 82784 ASSAY IGA/IGD/IGG/IGM EACH: CPT

## 2024-01-23 PROCEDURE — 69210 REMOVE IMPACTED EAR WAX UNI: CPT | Performed by: OTOLARYNGOLOGY

## 2024-01-23 PROCEDURE — 84443 ASSAY THYROID STIM HORMONE: CPT

## 2024-01-23 PROCEDURE — 82728 ASSAY OF FERRITIN: CPT
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
## 2024-01-24 ENCOUNTER — TELEPHONE (OUTPATIENT)
Dept: INTERNAL MEDICINE CLINIC | Facility: CLINIC | Age: 74
End: 2024-01-24

## 2024-01-24 LAB
ENA JO1 AB SER IA-ACNC: <0.3 U/ML
ENA RNP IGG SER IA-ACNC: 2.5 U/ML
ENA SCL70 IGG SER IA-ACNC: <0.6 U/ML
ENA SS-A IGG SER IA-ACNC: <0.4 U/ML
ENA SS-B IGG SER IA-ACNC: <0.4 U/ML
U1 SNRNP IGG SER IA-ACNC: 1.8 U/ML

## 2024-01-24 NOTE — TELEPHONE ENCOUNTER
Please call patient spouse, Pat  They rec'd results of blood test, ALT/AST are high   They would like to discuss with Dr Jeronimo  Patient is currently too weak to come into office  Tasked to nursing

## 2024-01-25 LAB
KAPPA LC FREE SER-MCNC: 1.27 MG/DL (ref 0.33–1.94)
KAPPA LC FREE/LAMBDA FREE SER NEPH: 1.25 {RATIO} (ref 0.26–1.65)
LAMBDA LC FREE SERPL-MCNC: 1.02 MG/DL (ref 0.57–2.63)

## 2024-02-01 ENCOUNTER — TELEMEDICINE (OUTPATIENT)
Dept: INTERNAL MEDICINE CLINIC | Facility: CLINIC | Age: 74
End: 2024-02-01
Payer: MEDICARE

## 2024-02-01 DIAGNOSIS — Z98.890 HISTORY OF PARATHYROIDECTOMY: ICD-10-CM

## 2024-02-01 DIAGNOSIS — L29.9 SEVERE PRURITUS: ICD-10-CM

## 2024-02-01 DIAGNOSIS — R53.81 PHYSICAL DECONDITIONING: ICD-10-CM

## 2024-02-01 DIAGNOSIS — F32.2 CURRENT SEVERE EPISODE OF MAJOR DEPRESSIVE DISORDER WITHOUT PSYCHOTIC FEATURES WITHOUT PRIOR EPISODE (HCC): ICD-10-CM

## 2024-02-01 DIAGNOSIS — Z90.89 HISTORY OF PARATHYROIDECTOMY: ICD-10-CM

## 2024-02-01 DIAGNOSIS — H93.13 TINNITUS OF BOTH EARS: ICD-10-CM

## 2024-02-01 DIAGNOSIS — R79.89 ELEVATED LFTS: Primary | ICD-10-CM

## 2024-02-01 DIAGNOSIS — E43 SEVERE MALNUTRITION (HCC): ICD-10-CM

## 2024-02-01 PROCEDURE — 99205 OFFICE O/P NEW HI 60 MIN: CPT | Performed by: INTERNAL MEDICINE

## 2024-02-01 NOTE — PROGRESS NOTES
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  21     Labs sl elevation of  lft           Patient Active Problem List   Diagnosis    Mixed hyperlipidemia    Generalized anxiety disorder    Sensorineural hearing loss, bilateral    S/P revision of total knee, right    Chronic fatigue    Tinnitus of both ears    Frequent falls    Subarachnoid hemorrhage (HCC)    Multiple rib fractures involving four or more ribs    Alcohol dependence (HCC)    Severe recurrent 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 (HCC)    Insomnia related to another mental disorder    Dehydration    Protein-calorie malnutrition, unspecified severity (HCC)    Hyperparathyroidism (HCC)          Current Outpatient Medications on File Prior to Visit   Medication Sig Dispense Refill    PARoxetine 10 MG Oral Tab Take 1 tablet (10 mg total) by mouth every morning. 90 tablet 1    LORazepam 1 MG Oral Tab QID daily reported 2/6/23      difluprednate 0.05 % Ophthalmic Emulsion INSTILL 1 DROP RIGHT EYE FOUR TIMES DAILY AND 1 DROP LEFT EYE THREE TIMES DAILY AS DIRECTED      prednisoLONE 1 % Ophthalmic Suspension as needed.       No current facility-administered medications on file prior to visit.          There were no vitals filed for this visit.VITALSThere is no height or weight on file to calculate BMI.    Pertinent findings on the physical exam; looks frail  flat affect depressed -     Luis was seen  today for weight loss.    Diagnoses and all orders for this visit:    Elevated LFTs    Severe pruritus    Tinnitus of both ears    Physical deconditioning    Severe malnutrition (HCC)    History of parathyroidectomy    Current severe episode of major depressive disorder without psychotic features without prior episode (HCC)       Discussed current  sxs and previous w/u     Suspect  sys dz vs psych     Has no obvious lymphoma or  myeloma  or mastocytosis - has had  GI w/u and imaging     Had w/u for hepatitis  autoimmune dz   carcinoid       In review of chart I think had good w/u including  heme onc  consult -         Is on lorazepam and ssri      Unfortunately  I dont have any new  ideas  -     With vist and chart review  I spent 70 min on this vist     This note was prepared using Dragon Medical voice recognition dictation software and as a result, errors may occur. When identified, these errors have been corrected. While every attempt is made to correct errors during dictation, discrepancies may still exist

## 2024-02-06 LAB — ANTI-SRP AB: NEGATIVE

## 2024-02-12 ENCOUNTER — TELEPHONE (OUTPATIENT)
Dept: INTERNAL MEDICINE CLINIC | Facility: CLINIC | Age: 74
End: 2024-02-12

## 2024-02-12 DIAGNOSIS — E43 SEVERE PROTEIN-CALORIE MALNUTRITION (HCC): Primary | ICD-10-CM

## 2024-02-12 NOTE — TELEPHONE ENCOUNTER
Spouse is calling in regards to patient, being in more pain. She wants to know if Dr. Burroughs has found out any more ideas or recommendations for them.     She states he is complaining about bone pain, especially hip pain and its is becoming more difficult for him to lay down.       Please advice

## 2024-02-12 NOTE — TELEPHONE ENCOUNTER
Talk to Mr. Peres's wife for an extended period of time I could not find an obvious cause for his demise.  I also called residential hospice and see if they would evaluate him.

## 2024-02-14 ENCOUNTER — TELEPHONE (OUTPATIENT)
Dept: INTERNAL MEDICINE CLINIC | Facility: CLINIC | Age: 74
End: 2024-02-14

## 2024-02-14 NOTE — TELEPHONE ENCOUNTER
Liver tests remain elevated, AST 46, ALT 77  CEA level normal    MyChart message sent, clinical evaluation recommended as we still do not have a diagnosis.  Would recommend a trial of medications we were previously hesitant on using before.  But needs reassessment and clinical correlation

## 2024-03-07 PROBLEM — T50.902A INTENTIONAL DRUG OVERDOSE, INITIAL ENCOUNTER (HCC): Status: ACTIVE | Noted: 2024-03-07

## 2024-03-07 PROBLEM — T50.901A OVERDOSE: Status: ACTIVE | Noted: 2024-03-07

## 2024-03-07 PROBLEM — T50.902A SUICIDAL OVERDOSE, INITIAL ENCOUNTER (HCC): Status: ACTIVE | Noted: 2024-03-07

## 2024-03-09 PROBLEM — T50.902A INTENTIONAL DRUG OVERDOSE (HCC): Status: ACTIVE | Noted: 2024-03-07

## 2024-03-11 ENCOUNTER — APPOINTMENT (OUTPATIENT)
Dept: CT IMAGING | Facility: HOSPITAL | Age: 74
End: 2024-03-11
Attending: HOSPITALIST
Payer: MEDICARE

## 2024-03-11 PROCEDURE — 74177 CT ABD & PELVIS W/CONTRAST: CPT | Performed by: HOSPITALIST

## 2024-03-12 ENCOUNTER — APPOINTMENT (OUTPATIENT)
Dept: ULTRASOUND IMAGING | Facility: HOSPITAL | Age: 74
End: 2024-03-12
Attending: Other
Payer: MEDICARE

## 2024-03-12 ENCOUNTER — APPOINTMENT (OUTPATIENT)
Dept: CV DIAGNOSTICS | Facility: HOSPITAL | Age: 74
End: 2024-03-12
Attending: HOSPITALIST
Payer: MEDICARE

## 2024-03-12 ENCOUNTER — APPOINTMENT (OUTPATIENT)
Dept: CT IMAGING | Facility: HOSPITAL | Age: 74
End: 2024-03-12
Attending: HOSPITALIST
Payer: MEDICARE

## 2024-03-12 PROBLEM — R53.1 GENERALIZED WEAKNESS: Status: ACTIVE | Noted: 2018-05-17

## 2024-03-12 PROCEDURE — 93306 TTE W/DOPPLER COMPLETE: CPT | Performed by: HOSPITALIST

## 2024-03-12 PROCEDURE — 70450 CT HEAD/BRAIN W/O DYE: CPT | Performed by: HOSPITALIST

## 2024-03-12 PROCEDURE — 93880 EXTRACRANIAL BILAT STUDY: CPT | Performed by: OTHER

## 2024-03-13 ENCOUNTER — ANESTHESIA EVENT (OUTPATIENT)
Dept: ENDOSCOPY | Facility: HOSPITAL | Age: 74
End: 2024-03-13
Payer: MEDICARE

## 2024-03-13 ENCOUNTER — ANESTHESIA (OUTPATIENT)
Dept: ENDOSCOPY | Facility: HOSPITAL | Age: 74
End: 2024-03-13
Payer: MEDICARE

## 2024-03-13 RX ORDER — PHENYLEPHRINE HCL 10 MG/ML
VIAL (ML) INJECTION AS NEEDED
Status: DISCONTINUED | OUTPATIENT
Start: 2024-03-13 | End: 2024-03-13 | Stop reason: SURG

## 2024-03-13 RX ADMIN — PHENYLEPHRINE HCL 50 MCG: 10 MG/ML VIAL (ML) INJECTION at 13:03:00

## 2024-03-13 NOTE — ANESTHESIA POSTPROCEDURE EVALUATION
Patient: Luis Peres    Procedure Summary       Date: 03/13/24 Room / Location: [facility] ENDOSCOPY 05 / [facility] ENDOSCOPY    Anesthesia Start: 1245 Anesthesia Stop:     Procedure: COLONOSCOPY Diagnosis: (colon polyps)    Surgeons: Kay Vick MD Anesthesiologist: Mervin Ruiz MD    Anesthesia Type: MAC ASA Status: 2            Anesthesia Type: MAC    Vitals Value Taken Time   /78 03/13/24 1311   Temp 98 03/13/24 1311   Pulse 52 03/13/24 1311   Resp 12 03/13/24 1311   SpO2 100 % 03/13/24 1311   Vitals shown include unfiled device data.    [facility] AN Post Evaluation:   Patient Evaluated in PACU  Patient Participation: complete - patient participated  Level of Consciousness: awake  Pain Management: adequate  Airway Patency:patent  Dental exam unchanged from preop  Yes    Cardiovascular Status: acceptable  Respiratory Status: acceptable  Postoperative Hydration acceptable      MERVIN RUIZ MD  3/13/2024 1:11 PM
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
## 2024-03-19 ENCOUNTER — APPOINTMENT (OUTPATIENT)
Dept: GENERAL RADIOLOGY | Facility: HOSPITAL | Age: 74
End: 2024-03-19
Attending: HOSPITALIST
Payer: MEDICARE

## 2024-03-19 PROCEDURE — 74018 RADEX ABDOMEN 1 VIEW: CPT | Performed by: HOSPITALIST

## 2024-03-25 PROBLEM — F33.2 MDD (MAJOR DEPRESSIVE DISORDER), RECURRENT EPISODE, SEVERE (HCC): Status: ACTIVE | Noted: 2024-03-25

## 2024-03-26 ENCOUNTER — PATIENT OUTREACH (OUTPATIENT)
Dept: CASE MANAGEMENT | Age: 74
End: 2024-03-26

## 2024-03-26 PROBLEM — F33.2 SEVERE EPISODE OF RECURRENT MAJOR DEPRESSIVE DISORDER, WITHOUT PSYCHOTIC FEATURES (HCC): Chronic | Status: ACTIVE | Noted: 2024-03-25

## 2024-03-26 PROBLEM — F33.2 SEVERE RECURRENT MAJOR DEPRESSION WITHOUT PSYCHOTIC FEATURES (HCC): Chronic | Status: RESOLVED | Noted: 2021-12-20 | Resolved: 2024-03-26

## 2024-03-26 PROBLEM — T50.902A SUICIDAL OVERDOSE, INITIAL ENCOUNTER (HCC): Status: RESOLVED | Noted: 2024-03-07 | Resolved: 2024-03-26

## 2024-03-28 PROBLEM — T50.902A INTENTIONAL DRUG OVERDOSE (HCC): Status: RESOLVED | Noted: 2024-03-07 | Resolved: 2024-03-28

## 2024-03-28 PROBLEM — F10.20 ALCOHOL DEPENDENCE (HCC): Status: RESOLVED | Noted: 2021-07-23 | Resolved: 2024-03-28

## 2024-03-31 ENCOUNTER — HOSPITAL ENCOUNTER (EMERGENCY)
Facility: HOSPITAL | Age: 74
Discharge: HOME | End: 2024-03-31
Payer: MEDICARE

## 2024-03-31 VITALS
DIASTOLIC BLOOD PRESSURE: 74 MMHG | RESPIRATION RATE: 20 BRPM | SYSTOLIC BLOOD PRESSURE: 107 MMHG | OXYGEN SATURATION: 97 % | HEART RATE: 84 BPM | TEMPERATURE: 98 F

## 2024-03-31 DIAGNOSIS — R33.9 URINARY RETENTION: Primary | ICD-10-CM

## 2024-03-31 PROCEDURE — 99284 EMERGENCY DEPT VISIT MOD MDM: CPT

## 2024-03-31 PROCEDURE — 99283 EMERGENCY DEPT VISIT LOW MDM: CPT

## 2024-03-31 PROCEDURE — 51798 US URINE CAPACITY MEASURE: CPT

## 2024-03-31 PROCEDURE — 87086 URINE CULTURE/COLONY COUNT: CPT | Performed by: NURSE PRACTITIONER

## 2024-03-31 PROCEDURE — 51702 INSERT TEMP BLADDER CATH: CPT

## 2024-03-31 PROCEDURE — 87077 CULTURE AEROBIC IDENTIFY: CPT | Performed by: NURSE PRACTITIONER
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
## 2024-03-31 NOTE — ED INITIAL ASSESSMENT (HPI)
Discharged from [facility] today, had Mackay catheter removed (history enlarged prostate and urinary retention).  Has not urinated for 12 hours, and feels weak.

## 2024-03-31 NOTE — ED QUICK NOTES
Mackay care education provided to patient and wife. Leg bag provided for pt per request. Pt and wife educated on how to switch standard bag to leg bag. No questions by pt or family at this moment.

## 2024-04-01 ENCOUNTER — TELEPHONE (OUTPATIENT)
Facility: CLINIC | Age: 74
End: 2024-04-01

## 2024-04-01 NOTE — TELEPHONE ENCOUNTER
----- Message from Kay Joiner Ma, MD sent at 4/1/2024 10:14 AM CDT -----  Colon recall 3 years   Dear Luis,    I reviewed the pathology report from the polyp(s) we completely removed during your recent colonoscopy. The polyps removed were adenomas, which is a benign growth. However, these are the types of polyps that if not removed could become a colon cancer. All of your polyps were completely removed.     The current health care guidelines recommend that patients with the size, number, and types of polyps you have should repeat their colonoscopy in 3-5 years to look for any new polyps that might have grown.    If you have further questions please call me at [phone] or message me through [employer].    Sincerely,   Kay Vick MD

## 2024-04-01 NOTE — TELEPHONE ENCOUNTER
Health maintenance updated.    Last colonoscopy done 3/13/24 by Dr. Vick    3 year recall placed into Pt Outreach    Next due on 3/13/2027  per Dr. Vick

## 2024-04-01 NOTE — TELEPHONE ENCOUNTER
Patient contacted, spoke w/ spouse, Pat (on [facility]), pt's name/ verified.     Relayed message below from Dr. Vick  regarding colonoscopy results/recall    Spouse verbalized understanding.    No further questions

## 2024-04-01 NOTE — PROGRESS NOTES
ED Culture Callback Results Review    Pharmacist reviewed culture results from ED visit .    Final urine culture positive for untreated staph, not aureus.    No treatment is necessary at this time as culture is suggestive of asymptomatic bacteriuria rather than active infection as discussed with Dr. Dai.    Shakila Carson, PharmD  Emergency Medicine Pharmacist Specialist  04/01/24; 2:02 PM

## 2024-04-02 ENCOUNTER — TELEPHONE (OUTPATIENT)
Dept: INTERNAL MEDICINE CLINIC | Facility: CLINIC | Age: 74
End: 2024-04-02

## 2024-04-02 NOTE — TELEPHONE ENCOUNTER
Oneida chin [facility] called to inform pcp that pt has been admitted for nursing and therapy  Oneida wants to follow up with PCP  To please call her back at [phone]

## 2024-04-03 NOTE — TELEPHONE ENCOUNTER
Call returned to [facility] care nurse to discuss pt assessment and message received stating pt needs skilled nursing and PT orders. Unable to reach [facility]. Vmail message left to call office tomorrow to give statis report to triage nurse on pt assessment facts, proposed nursing/ PT visit needs (proposed care /therapy needs , frequency/ duration)

## 2024-04-05 ENCOUNTER — PATIENT OUTREACH (OUTPATIENT)
Dept: CASE MANAGEMENT | Age: 74
End: 2024-04-05

## 2024-04-05 NOTE — PROGRESS NOTES
1st attempt ER f/up apt request  PCP -decline, pt wife stated she will make apt next week  URO -decline, pt wife stated she will make apt next week  Closing encounter

## 2024-04-18 ENCOUNTER — TELEPHONE (OUTPATIENT)
Dept: INTERNAL MEDICINE CLINIC | Facility: CLINIC | Age: 74
End: 2024-04-18

## 2024-04-20 ENCOUNTER — HOSPITAL ENCOUNTER (EMERGENCY)
Facility: HOSPITAL | Age: 74
Discharge: HOME | End: 2024-04-20
Attending: EMERGENCY MEDICINE
Payer: MEDICARE

## 2024-04-20 ENCOUNTER — HOSPITAL ENCOUNTER (OUTPATIENT)
Age: 74
Discharge: HOME | End: 2024-04-20
Attending: EMERGENCY MEDICINE
Payer: MEDICARE

## 2024-04-20 VITALS
TEMPERATURE: 99 F | OXYGEN SATURATION: 96 % | DIASTOLIC BLOOD PRESSURE: 61 MMHG | HEART RATE: 99 BPM | SYSTOLIC BLOOD PRESSURE: 106 MMHG | RESPIRATION RATE: 16 BRPM

## 2024-04-20 VITALS
OXYGEN SATURATION: 98 % | SYSTOLIC BLOOD PRESSURE: 114 MMHG | RESPIRATION RATE: 18 BRPM | HEART RATE: 90 BPM | HEIGHT: 68 IN | WEIGHT: 120 LBS | BODY MASS INDEX: 18.19 KG/M2 | TEMPERATURE: 98 F | DIASTOLIC BLOOD PRESSURE: 68 MMHG

## 2024-04-20 DIAGNOSIS — T83.011A MALFUNCTION OF FOLEY CATHETER, INITIAL ENCOUNTER (HCC): Primary | ICD-10-CM

## 2024-04-20 DIAGNOSIS — T83.9XXA PROBLEM WITH FOLEY CATHETER, INITIAL ENCOUNTER (HCC): Primary | ICD-10-CM

## 2024-04-20 PROCEDURE — 99282 EMERGENCY DEPT VISIT SF MDM: CPT

## 2024-04-20 PROCEDURE — 99212 OFFICE O/P EST SF 10 MIN: CPT

## 2024-04-20 PROCEDURE — 99283 EMERGENCY DEPT VISIT LOW MDM: CPT

## 2024-04-20 NOTE — ED PROVIDER NOTES
Patient Seen in: [facility]      History     Chief Complaint   Patient presents with    Cath Tube Problem            Stated Complaint: catheter problem    Subjective:   HPI    The patient is a 74-year-old male with past history of hemorrhagic CVA, chronic indwelling Mackay who presents now with irritation of the appliance portion of his Mackay.  History is provided by the patient and his spouse.  The catheter stabilization device on the patient's right thigh had become wet and patient states the skin feels irritated.  The patient has had chronic itching of his skin \"since 2000\".  Patient states this is exacerbated by the appliance.  Patient denies any suprapubic pressure.  The patient states the catheter is draining normally.    Objective:   Past Medical History:    Altered mental status    Brain bleed (HCC)    Colon polyp    Deep vein thrombosis (HCC)    Esophageal reflux    Head trauma    Hearing loss    High cholesterol    Hyperlipidemia    Hyperparathyroidism (HCC)    Hyperthyroidism    IFG (impaired fasting glucose)    Need for vaccination    Physical exam, annual    Right upper quadrant abdominal pain    Screening for colorectal cancer    Tinnitus              No pertinent past surgical history.              No pertinent social history.            Review of Systems    Positive for stated complaint: catheter problem  Other systems are as noted in HPI.  Constitutional and vital signs reviewed.      All other systems reviewed and negative except as noted above.    Physical Exam     ED Triage Vitals [04/20/24 0812]   /61   Pulse 99   Resp 16   Temp 98.5 °F (36.9 °C)   Temp src Temporal   SpO2 96 %   O2 Device None (Room air)       Current:/61   Pulse 99   Temp 98.5 °F (36.9 °C) (Temporal)   Resp 16   SpO2 96%         Physical Exam    Constitutional: Well-developed well-nourished in no acute distress  Head: Normocephalic, no swelling or tenderness  Eyes: Nonicteric sclera, no  conjunctival injection  ENT: TMs are clear and flat bilaterally.  There is no posterior pharyngeal erythema  Chest: Clear to auscultation, no tenderness  Cardiovascular: Regular rate and rhythm without murmur  Abdomen: Soft, nontender and nondistended  Neurologic: Patient is awake, alert and oriented ×3.  The patient's motor strength is 5 out of 5 and symmetric in the upper and lower extremities bilaterally  Extremities: No focal swelling or tenderness  Skin: No significant redness to the skin of the right thigh.  The stabilization appliance is saturated in urine and is peeling.      ED Course   Labs Reviewed - No data to display          Stabilization appliance was exchanged for a new 1 that the patient's wife brought with her.         MDM      Catheter malfunction                                   Medical Decision Making      Disposition and Plan     Clinical Impression:  1. Malfunction of Mackay catheter, initial encounter ([facility])         Disposition:  Discharge  4/20/2024  8:27 am    Follow-up:  Clifford Burroughs  [address] [facility] [address]  [facility] [address]  [phone]      As needed          Medications Prescribed:  Current Discharge Medication List

## 2024-04-20 NOTE — ED INITIAL ASSESSMENT (HPI)
Patient presents to the ic, stat lock catheter stabilization device has been peeling, requesting to have it changed.  Wife brought new device from home.  Device changed, instructed patient and wife on how to change it.
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
## 2024-04-21 NOTE — ED INITIAL ASSESSMENT (HPI)
Pt who arrived with fish catheter on came in for \"statlock\" replacement and fish kept pulling.  Pt is alert, breathing unlabored.

## 2024-04-22 NOTE — TELEPHONE ENCOUNTER
Spouse of pt is calling requesting refill on finasteride. Spouse mention that medication was given to pt when admitted to hospital.       Please call and advise

## 2024-04-23 RX ORDER — FINASTERIDE 5 MG/1
5 TABLET, FILM COATED ORAL DAILY
Qty: 90 TABLET | Refills: 1 | Status: SHIPPED | OUTPATIENT
Start: 2024-04-23

## 2024-04-23 NOTE — TELEPHONE ENCOUNTER
You have not prescribed this before.  Requested Prescriptions     Pending Prescriptions Disp Refills    finasteride 5 MG Oral Tab 30 tablet 0     Sig: Take 1 tablet (5 mg total) by mouth daily.     LAST REFILL DATE    QUANTITY REQUESTED    DAY SUPPLY    DIAGNOSIS    LAST OFFICE VISIT  4/8/24(telehealth)   FOLLOW UP DUE    No future appointments.

## 2024-04-24 ENCOUNTER — PATIENT OUTREACH (OUTPATIENT)
Dept: CASE MANAGEMENT | Age: 74
End: 2024-04-24

## 2024-04-25 NOTE — PROGRESS NOTES
1st attempt ER f/up apt request  No answer, LVMTCB to schedule apts  PCP -unable to contact  URO -unable to contact  Closing encounter   
2nd attempt ER f/up apt request  PCP -decline, pt wife stated they can make own apt  URO -decline, pt wife stated they can make own apt  Closing encounter       
VM received; pt wife, Citlali returning call    
No

## 2024-04-26 ENCOUNTER — TELEPHONE (OUTPATIENT)
Dept: INTERNAL MEDICINE CLINIC | Facility: CLINIC | Age: 74
End: 2024-04-26

## 2024-04-26 NOTE — TELEPHONE ENCOUNTER
Tamy, nurse from [facility] called requesting the plan of care, they currently have not gotten a plan and wants to know what exactly to do    To please call her back at [phone]

## 2024-04-29 ENCOUNTER — TELEPHONE (OUTPATIENT)
Dept: INTERNAL MEDICINE CLINIC | Facility: CLINIC | Age: 74
End: 2024-04-29

## 2024-04-29 NOTE — TELEPHONE ENCOUNTER
Was patient by home health care that the urine in the fish is cloudy   No pain no fever or chills   He is due for a change in 2 days   Advised to run  UA with reflex   If worsening symptoms to see the pcp / ER

## 2024-05-03 ENCOUNTER — TELEPHONE (OUTPATIENT)
Dept: INTERNAL MEDICINE CLINIC | Facility: CLINIC | Age: 74
End: 2024-05-03

## 2024-05-03 DIAGNOSIS — I60.9 SUBARACHNOID HEMORRHAGE (HCC): ICD-10-CM

## 2024-05-03 DIAGNOSIS — F33.2 SEVERE EPISODE OF RECURRENT MAJOR DEPRESSIVE DISORDER, WITHOUT PSYCHOTIC FEATURES (HCC): ICD-10-CM

## 2024-05-03 DIAGNOSIS — R53.1 GENERALIZED WEAKNESS: Primary | ICD-10-CM

## 2024-05-03 NOTE — TELEPHONE ENCOUNTER
Returning home health message from [facility] and spoke with Luz who stated the urinalysis had been from the old fish. She also stated the spouse of the patient keeps cancelling and rescheduling physical therapy appointments. Patient has not had therapy since the order had been placed in April.  RN informed Luz will call the spouse to discuss. Luz is requesting a new order if the spouse is now ready for patient physical therapy.

## 2024-05-03 NOTE — TELEPHONE ENCOUNTER
Spoke with Citlali stated there are scheduling problems with home health physical therapist and his home nursing care. Spouse wants to proceed with home physical therapy.        External Home Health Physical therapy pended; authorize if appropriate.

## 2024-05-06 NOTE — TELEPHONE ENCOUNTER
A message left on  Tamy of [facility] at [phone] to fax clinical information and order to our office for signature.

## 2024-05-08 ENCOUNTER — OFFICE VISIT (OUTPATIENT)
Dept: INTERNAL MEDICINE CLINIC | Facility: CLINIC | Age: 74
End: 2024-05-08
Payer: MEDICARE

## 2024-05-08 ENCOUNTER — HOSPITAL ENCOUNTER (EMERGENCY)
Facility: HOSPITAL | Age: 74
Discharge: HOME | End: 2024-05-08
Attending: EMERGENCY MEDICINE
Payer: MEDICARE

## 2024-05-08 VITALS
WEIGHT: 120 LBS | SYSTOLIC BLOOD PRESSURE: 110 MMHG | OXYGEN SATURATION: 95 % | DIASTOLIC BLOOD PRESSURE: 62 MMHG | HEART RATE: 75 BPM | HEIGHT: 68 IN | BODY MASS INDEX: 18.19 KG/M2

## 2024-05-08 VITALS
RESPIRATION RATE: 18 BRPM | SYSTOLIC BLOOD PRESSURE: 116 MMHG | DIASTOLIC BLOOD PRESSURE: 63 MMHG | TEMPERATURE: 98 F | OXYGEN SATURATION: 94 % | HEIGHT: 68 IN | HEART RATE: 80 BPM | BODY MASS INDEX: 18.19 KG/M2 | WEIGHT: 120 LBS

## 2024-05-08 DIAGNOSIS — L29.9 SEVERE PRURITUS: Primary | ICD-10-CM

## 2024-05-08 DIAGNOSIS — E43 SEVERE PROTEIN-CALORIE MALNUTRITION (HCC): ICD-10-CM

## 2024-05-08 DIAGNOSIS — T83.9XXA COMPLICATION OF FOLEY CATHETER, INITIAL ENCOUNTER (HCC): Primary | ICD-10-CM

## 2024-05-08 DIAGNOSIS — R53.1 GENERALIZED WEAKNESS: ICD-10-CM

## 2024-05-08 DIAGNOSIS — F33.2 SEVERE EPISODE OF RECURRENT MAJOR DEPRESSIVE DISORDER, WITHOUT PSYCHOTIC FEATURES (HCC): ICD-10-CM

## 2024-05-08 PROCEDURE — 99282 EMERGENCY DEPT VISIT SF MDM: CPT

## 2024-05-08 PROCEDURE — G2211 COMPLEX E/M VISIT ADD ON: HCPCS | Performed by: INTERNAL MEDICINE

## 2024-05-08 PROCEDURE — 99214 OFFICE O/P EST MOD 30 MIN: CPT | Performed by: INTERNAL MEDICINE

## 2024-05-08 RX ORDER — HYDROXYZINE HYDROCHLORIDE 10 MG/1
10 TABLET, FILM COATED ORAL 3 TIMES DAILY PRN
Qty: 90 TABLET | Refills: 1 | Status: SHIPPED | OUTPATIENT
Start: 2024-05-08

## 2024-05-08 NOTE — ED INITIAL ASSESSMENT (HPI)
Catheter started leaking from somewhere but pt and family are unsure as to where it is leaking from.

## 2024-05-08 NOTE — PROGRESS NOTES
Luis Peres male 74 year old    Here with wife       Chief Complaint   Patient presents with    ER F/U    C/o multiple issues -  discussed hosp stay and recent  w/u and curretn  status     Discussed  itching - getting worse - has oily forehead - - has itching  for  last  3 yrs  seen derm no dx -     Vision poor ( has  Fuch'se dz)    Hearing poor - states all meds cause tinnitus      Feels like getting  weaker -  despite  gaining  20 lbs     Fish  has helped  Bms no longer constipated  - has  f/u with  urology     Feet and  hands numb     Does endorse his  depression  -      Ambulates  with walker  (  so can hook up  fish bag to it )        No fever chills   states not sleeping well       Patient Active Problem List   Diagnosis    Mixed hyperlipidemia    Generalized anxiety disorder    Sensorineural hearing loss, bilateral    S/P revision of total knee, right    Generalized weakness    Tinnitus of both ears    Frequent falls    Subarachnoid hemorrhage (HCC)    Multiple rib fractures involving four or more ribs    Insomnia due to other mental disorder    Dehydration    Protein-calorie malnutrition, unspecified severity (HCC)    Hyperparathyroidism (HCC)    Severe episode of recurrent major depressive disorder, without psychotic features (HCC)          Current Outpatient Medications on File Prior to Visit   Medication Sig Dispense Refill    finasteride 5 MG Oral Tab Take 1 tablet (5 mg total) by mouth daily. 90 tablet 1    mirtazapine 30 MG Oral Tab Take 1 tablet (30 mg total) by mouth nightly. 30 tablet 0    polyethylene glycol, PEG 3350, 17 g Oral Powd Pack Take 17 g by mouth daily as needed (Constipation). 30 each 0    sennosides 8.6 MG Oral Tab Take 1 tablet (8.6 mg total) by mouth 2 (two) times daily as needed (Constipation). 60 tablet 0    multivitamin with minerals Oral Tab Take 1 tablet by mouth daily. (Patient not taking: Reported on 5/8/2024) 30 tablet 0    thiamine 100 MG Oral Tab Take 1 tablet  (100 mg total) by mouth daily. (Patient not taking: Reported on 5/8/2024) 30 tablet 0     No current facility-administered medications on file prior to visit.          Vitals:    05/08/24 1154   BP: 110/62   Pulse: 75   VITALSBody mass index is 18.25 kg/m².    Pertinent findings on the physical exam; his forehead  is  oily  feeling  -  no resp distress  lungs clear has  fish in place  - no obvious  rash    Luis was seen today for er f/u.    Diagnoses and all orders for this visit:    Severe pruritus    Generalized weakness    Severe protein-calorie malnutrition (HCC)    Severe episode of recurrent major depressive disorder, without psychotic features (HCC)    Other orders  -     hydrOXYzine 10 MG Oral Tab; Take 1 tablet (10 mg total) by mouth 3 (three) times daily as needed for Itching.       He seems like improving  with  wt gain and  ambulating -  but  states  ihe is declining      Discussed  extensive  w/u in past [facility]  hospital  - labs imaging  colonoscopy -        Discussed expectations and  discussed depression      Will  look up  w/u for  pruritus     Keep other meds including  mirtazpine as is      Get  PT    This note was prepared using Dragon Medical voice recognition dictation software and as a result, errors may occur. When identified, these errors have been corrected. While every attempt is made to correct errors during dictation, discrepancies may still exist
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
## 2024-05-09 ENCOUNTER — TELEPHONE (OUTPATIENT)
Dept: INTERNAL MEDICINE CLINIC | Facility: CLINIC | Age: 74
End: 2024-05-09

## 2024-05-09 NOTE — TELEPHONE ENCOUNTER
Tamy from home health would like to talk to nurse regarding patients plan for physical therapy. Please call her back at [phone]

## 2024-05-09 NOTE — TELEPHONE ENCOUNTER
Spoke with Tamy from home health. Patient will have 3 more weeks of nursing visits. Patient has not been knee on starting PT because they keep rescheduling. Tamy states that will start PT at the beginning of the next session.

## 2024-05-10 ENCOUNTER — PATIENT OUTREACH (OUTPATIENT)
Dept: CASE MANAGEMENT | Age: 74
End: 2024-05-10

## 2024-05-10 NOTE — PROGRESS NOTES
1st attempt ER f/up apt request  PCP -existing apt (5/8)  URO -existing apt (5/23)  Closing encounter

## 2024-05-23 ENCOUNTER — APPOINTMENT (OUTPATIENT)
Dept: URBAN - METROPOLITAN AREA CLINIC 248 | Age: 74
Setting detail: DERMATOLOGY
End: 2024-05-29

## 2024-05-23 DIAGNOSIS — L20.89 OTHER ATOPIC DERMATITIS: ICD-10-CM

## 2024-05-23 PROCEDURE — 99203 OFFICE O/P NEW LOW 30 MIN: CPT

## 2024-05-23 PROCEDURE — OTHER COUNSELING: OTHER

## 2024-05-23 PROCEDURE — OTHER PRESCRIPTION: OTHER

## 2024-05-23 PROCEDURE — OTHER PRESCRIPTION MEDICATION MANAGEMENT: OTHER

## 2024-05-23 PROCEDURE — OTHER ADDITIONAL NOTES: OTHER

## 2024-05-23 RX ORDER — CLOBETASOL PROPIONATE 0.5 MG/ML
SOLUTION TOPICAL
Qty: 50 | Refills: 2 | Status: ERX | COMMUNITY
Start: 2024-05-23

## 2024-05-23 RX ORDER — TRIAMCINOLONE ACETONIDE 1 MG/G
CREAM TOPICAL BID
Qty: 80 | Refills: 1 | Status: ERX | COMMUNITY
Start: 2024-05-23

## 2024-05-23 ASSESSMENT — LOCATION DETAILED DESCRIPTION DERM
LOCATION DETAILED: INFERIOR MID FOREHEAD
LOCATION DETAILED: LEFT ANTERIOR DISTAL UPPER ARM
LOCATION DETAILED: LEFT SUPERIOR PARIETAL SCALP
LOCATION DETAILED: LEFT MID-UPPER BACK
LOCATION DETAILED: RIGHT ANTECUBITAL SKIN

## 2024-05-23 ASSESSMENT — LOCATION SIMPLE DESCRIPTION DERM
LOCATION SIMPLE: SCALP
LOCATION SIMPLE: INFERIOR FOREHEAD
LOCATION SIMPLE: LEFT UPPER ARM
LOCATION SIMPLE: RIGHT ELBOW
LOCATION SIMPLE: LEFT UPPER BACK

## 2024-05-23 ASSESSMENT — LOCATION ZONE DERM
LOCATION ZONE: SCALP
LOCATION ZONE: ARM
LOCATION ZONE: FACE
LOCATION ZONE: TRUNK

## 2024-05-23 NOTE — PROCEDURE: PRESCRIPTION MEDICATION MANAGEMENT
Plan: Recommended an antihistamine like Zyrtec. Take BID. Once in the morning and at night.
Detail Level: Zone
Render In Strict Bullet Format?: No
Initiate Treatment: clobetasol 0.05 % scalp solution \\nQuantity: 50.0 ml  Days Supply: 30\\nSig: Apply solution to the scalp and back of neck BID. Use Monday-Friday and take a break on the weekends.\\n\\ntriamcinolone acetonide 0.1 % topical cream BID\\nQuantity: 80.0 g  Days Supply: 30\\nSig: Apply to AA BID Monday-Friday for up to 2 weeks

## 2024-05-23 NOTE — PROCEDURE: ADDITIONAL NOTES
Render Risk Assessment In Note?: no
Detail Level: Simple
Additional Notes: Pt has catheter in at the moment. Is seeing a urologist later for treatment. According to pt, his liver and kidney labs are all within normal range.
Additional Notes: Discussed next steps if rx doesn’t work. Tacrolimus next then Biologics (Dupixent)

## 2024-05-23 NOTE — HPI: RASH
What Type Of Note Output Would You Prefer (Optional)?: Bullet Format
How Severe Is Your Rash?: mild
Is This A New Presentation, Or A Follow-Up?: Rash
Additional History: Always itchy and no relief

## 2024-07-01 ENCOUNTER — HOSPITAL ENCOUNTER (OUTPATIENT)
Age: 74
Discharge: HOME | End: 2024-07-01
Attending: EMERGENCY MEDICINE
Payer: MEDICARE

## 2024-07-01 VITALS
TEMPERATURE: 98 F | OXYGEN SATURATION: 96 % | HEART RATE: 92 BPM | DIASTOLIC BLOOD PRESSURE: 62 MMHG | SYSTOLIC BLOOD PRESSURE: 113 MMHG | RESPIRATION RATE: 18 BRPM

## 2024-07-01 DIAGNOSIS — L02.212 ABSCESS OF BACK: Primary | ICD-10-CM

## 2024-07-01 PROCEDURE — 10061 I&D ABSCESS COMP/MULTIPLE: CPT

## 2024-07-01 PROCEDURE — 99214 OFFICE O/P EST MOD 30 MIN: CPT

## 2024-07-01 PROCEDURE — 99213 OFFICE O/P EST LOW 20 MIN: CPT

## 2024-07-01 NOTE — ED INITIAL ASSESSMENT (HPI)
Patient with abscess to left side of back for about 4 weeks   Patient states he lays on his back most of the day   No fever  No drainage  Area is itchy

## 2024-07-01 NOTE — ED PROVIDER NOTES
Patient Seen in: [facility]      History     Chief Complaint   Patient presents with    Abscess     Stated Complaint: Sore on back    Subjective:   HPI    Patient is a 74-year-old male with past history of hearing loss, tinnitus, subarachnoid hemorrhage who presents now with pain and tenderness in the left mid back.  The patient states he has had some pain and swelling to the left mid back for the last few weeks.  The patient denies any fever.  The patient denies any trauma.    Objective:   No pertinent past medical history.            No pertinent past surgical history.              No pertinent social history.            Review of Systems    Positive for stated Chief Complaint: Abscess    Other systems are as noted in HPI.  Constitutional and vital signs reviewed.      All other systems reviewed and negative except as noted above.    Physical Exam     ED Triage Vitals [07/01/24 1438]   /62   Pulse 92   Resp 18   Temp 98.4 °F (36.9 °C)   Temp src Temporal   SpO2 96 %   O2 Device None (Room air)       Current Vitals:   Vital Signs  BP: 113/62  Pulse: 92  Resp: 18  Temp: 98.4 °F (36.9 °C)  Temp src: Temporal    Oxygen Therapy  SpO2: 96 %  O2 Device: None (Room air)            Physical Exam    Constitutional: Well-developed well-nourished in no acute distress  Head: Normocephalic, no swelling or tenderness  Eyes: Nonicteric sclera, no conjunctival injection  ENT: TMs are clear and flat bilaterally.  There is no posterior pharyngeal erythema  Chest: Clear to auscultation, no tenderness  Cardiovascular: Regular rate and rhythm without murmur  Abdomen: Soft, nontender and nondistended  Back: Small abscess/infected sebaceous cyst in the left mid back  Neurologic: Patient is awake, alert and oriented ×3.  The patient's motor strength is 5 out of 5 and symmetric in the upper and lower extremities bilaterally  Extremities: No focal swelling or tenderness  Skin: There is a 2 x 2.5 area of swelling and  tenderness in the left mid back with minimal fluctuance      ED Course   Labs Reviewed - No data to display          Procedure note: Incision and drainage of infected sebaceous cyst  After anesthetizing the skin with 1% lidocaine without epinephrine, a small 1.5 cm vertical incision was made over the area of swelling and tenderness.  A small amount of purulent material and moderate sedation were expressed.  The wound was probed for any loculations.  The wound was irrigated with normal saline.  A small wick of iodoform gauze was placed.  The patient tolerated the procedure well.       There is minimal redness to the overlying skin.  The patient states that he does not tolerate antibiotics as they cause significant worsening of his ear problems.  The patient verbalizes an understanding of small risk of infection without taking antibiotics, but prefers not to take due to side effects.  The overlying skin is minimally erythematous.      MDM      Abscess versus infected sebaceous cyst of the left back                                   Medical Decision Making      Disposition and Plan     Clinical Impression:  1. Abscess of back         Disposition:  Discharge  7/1/2024  3:00 pm    Follow-up:  Clifford Burroughs  [address] [id number] [phone]      As needed    [facility] [place] [address] [place] [address] [phone]    For wound re-check for wound check and packing removal in 48 to 72 hours.          Medications Prescribed:  Current Discharge Medication List

## 2024-07-03 ENCOUNTER — TELEPHONE (OUTPATIENT)
Dept: INTERNAL MEDICINE CLINIC | Facility: CLINIC | Age: 74
End: 2024-07-03

## 2024-07-17 ENCOUNTER — OFFICE VISIT (OUTPATIENT)
Facility: CLINIC | Age: 74
End: 2024-07-17
Payer: MEDICARE

## 2024-07-17 VITALS
DIASTOLIC BLOOD PRESSURE: 73 MMHG | HEIGHT: 68 IN | WEIGHT: 120 LBS | SYSTOLIC BLOOD PRESSURE: 112 MMHG | BODY MASS INDEX: 18.19 KG/M2 | HEART RATE: 69 BPM

## 2024-07-17 DIAGNOSIS — L29.9 PRURITUS: Primary | ICD-10-CM

## 2024-07-17 PROCEDURE — 99214 OFFICE O/P EST MOD 30 MIN: CPT | Performed by: INTERNAL MEDICINE

## 2024-07-17 NOTE — PROGRESS NOTES
Subjective:   Patient ID: Luis Peres is a 74 year old male.    LATISHA Fuller returns in follow-up today with his wife Winsome.  He was last seen by myself in July 2023 and most recently in the hospital by Kay Vick MD in March 2024.  Please see previous notes for historical details.     As per previous notes he has a previous history of right upper quadrant/right flank pain along with some epigastric discomfort that occurs after eating.  The symptoms are predominantly in the right lateral upper quadrant/right flank.  A CT scan in December 2016 was negative.  Upper and lower endoscopy in May 2017 revealed #4 adenomatous polyps in the setting of a fair colonoscopic preparation, sigmoid colon diverticulosis and a normal upper endoscopy.       In June 2022 the patient underwent an upper endoscopy for postprandial abdominal bloating, discomfort, erratic bowel movements, weight loss and a patulous appearance to the distal esophagus.  The endoscopy was normal with the exception of a small hiatal hernia.  Esophageal, gastric and duodenal biopsies were negative.  We discussed that there were no structural GI causes to explain the patient's symptoms and that a colonoscopy would not be helpful leading to symptom improvement.    The patient continued to experience multiple somatic complaints most notably intractable tinnitus (abrupt onset October 2, 2014) with alternating hyperacusis and hearing loss.  He also had intractable pruritus dating back to at least 2022 especially over his scalp.  He feels that \"sebaceous oils\" can worsen his symptoms requiring frequent showering.  The showering unfortunately worsens the pruritus.  The pruritus is better when lying down.  Sitting in a chair exacerbates the pruritus.  He is only able to use lotions to treat the pruritus as oral medications will exacerbate his tinnitus.  He reportedly has had a negative skin biopsy.     In conjunction with the above he had lost a dramatic amount of weight  (#80 pounds).  He had undergone a thorough evaluation included negative laboratory testing with the exception of mildly elevated transaminases (normal tryptase, histamine, chromogranin A, heavy metals, cortisol, plasma cell disorder) and negative CT scans (abdomen and pelvis in March 2023 and chest, abdomen and pelvis in July 2023).  He had seen Dr. Callaway from hematology who does not feel that the patient has a malignancy.  Subsequent testing for porphyria was negative.    In March 2024 the patient was hospitalized after taking an excessive number of lorazepam tablets as a suicidal attempt.  The patient was found to have urinary retention treated with Mackay decompression.  A CT scan revealed rectosigmoid long segment wall thickening with inflammatory stranding and moderate to severe upstream colonic distention.  He underwent a bowel preparation and subsequent colonoscopy with removal of #3 subcentimeter tubular adenomas.  Uncomplicated diverticulosis was present.  There were no signs of a colitis or neoplasm.    The patient was discharged to inpatient psychiatry and placed on mirtazapine.     Current history:  Jonny returns today looking dramatically better from previous visits.  He underwent treatment of BPH and has since been able to void well following catheter removal.  He was distressed that the Mackay remained in place for about 3-1/2 months.    Jonny has gained a significant amount of weight likely from the mirtazapine.  His main reason for visit today is that of ongoing fatigue and intense pruritus which he feels is \"coming from the inside\".  He notes pruritus on his scalp, back, buttocks and legs.  He has noted a rash on his scalp and behind the ears.  He questions whether this could be due to kidney or liver dysfunction.    The patient's weight has increased by over 50 pounds.  He does not wish to gain too much weight.  He has also noted worsening hair loss since 2021 and ongoing fatigue.  The tinnitus  continues.  He is always cautious about trying new medications for fear of worsening the pruritus.  He did not want to take Flomax due to concerns of ocular side effects.  Jonny has been managing constipation with #2 Senokot tablets daily.  He is moving his bowels on a daily basis although he still experiences some constipation.    Overall the patient's physical appearance and subjective wellbeing have significantly improved since last visit.       History/Other:   Review of Systems  See above    Wt Readings from Last 6 Encounters:   07/17/24 120 lb (54.4 kg)   05/08/24 120 lb (54.4 kg)   05/08/24 120 lb (54.4 kg)   04/20/24 120 lb (54.4 kg)   03/25/24 111 lb 14.4 oz (50.8 kg)   10/19/23 100 lb (45.4 kg)       Current Outpatient Medications   Medication Sig Dispense Refill    hydrOXYzine 10 MG Oral Tab Take 1 tablet (10 mg total) by mouth 3 (three) times daily as needed for Itching. 90 tablet 1    finasteride 5 MG Oral Tab Take 1 tablet (5 mg total) by mouth daily. 90 tablet 1    mirtazapine 30 MG Oral Tab Take 1 tablet (30 mg total) by mouth nightly. 30 tablet 0    multivitamin with minerals Oral Tab Take 1 tablet by mouth daily. 30 tablet 0    polyethylene glycol, PEG 3350, 17 g Oral Powd Pack Take 17 g by mouth daily as needed (Constipation). 30 each 0    sennosides 8.6 MG Oral Tab Take 1 tablet (8.6 mg total) by mouth 2 (two) times daily as needed (Constipation). 60 tablet 0    thiamine 100 MG Oral Tab Take 1 tablet (100 mg total) by mouth daily. 30 tablet 0     Allergies:  Allergies   Allergen Reactions    Mold Coughing       Objective:   Physical Exam  Vitals and nursing note reviewed.   Constitutional:       General: He is not in acute distress.     Appearance: He is well-developed. He is not ill-appearing, toxic-appearing or diaphoretic.      Comments: Looks much better than previous  Much more nutritionally replete   HENT:      Mouth/Throat:      Pharynx: No oropharyngeal exudate.   Eyes:      General: No  scleral icterus.     Conjunctiva/sclera: Conjunctivae normal.   Neck:      Thyroid: No thyromegaly.   Cardiovascular:      Rate and Rhythm: Normal rate and regular rhythm.      Heart sounds: Normal heart sounds. No murmur heard.  Pulmonary:      Effort: Pulmonary effort is normal. No respiratory distress.      Breath sounds: Normal breath sounds. No wheezing or rales.   Abdominal:      General: Bowel sounds are normal. There is no distension.      Palpations: Abdomen is soft. There is no mass.      Tenderness: There is no abdominal tenderness. There is no guarding or rebound.   Musculoskeletal:      Cervical back: Neck supple.   Lymphadenopathy:      Cervical: No cervical adenopathy.   Skin:     Comments: Slight erythematous scaling behind the left ear  Age-appropriate alopecia   Neurological:      Mental Status: He is alert and oriented to person, place, and time.   Psychiatric:         Behavior: Behavior normal.       Component      Latest Ref Rng 3/26/2024 3/27/2024 3/28/2024   WBC      4.0 - 11.0 x10(3) uL 5.2   4.1    RBC      3.80 - 5.80 x10(6)uL 3.44 (L)   3.04 (L)    Hemoglobin      13.0 - 17.5 g/dL 11.2 (L)   10.1 (L)    Hematocrit      39.0 - 53.0 % 35.4 (L)   31.4 (L)    Platelet Count      150.0 - 450.0 10(3)uL 323.0   270.0    MCV      80.0 - 100.0 fL 102.9 (H)   103.3 (H)    MCH      26.0 - 34.0 pg 32.6   33.2    MCHC      31.0 - 37.0 g/dL 31.6   32.2    RDW      % 15.5   15.7    Prelim Neutrophil Abs      1.50 - 7.70 x10 (3) uL 2.97   2.63    Neutrophils Absolute      1.50 - 7.70 x10(3) uL 2.97   2.63    Lymphocytes Absolute      1.00 - 4.00 x10(3) uL 1.51   0.82 (L)    Monocytes Absolute      0.10 - 1.00 x10(3) uL 0.55   0.56    Eosinophils Absolute      0.00 - 0.70 x10(3) uL 0.07   0.09    Basophils Absolute      0.00 - 0.20 x10(3) uL 0.03   0.02    Immature Granulocyte Absolute      0.00 - 1.00 x10(3) uL 0.03   0.01    Neutrophils %      % 57.4   63.6    Lymphocytes %      % 29.3   19.9     Monocytes %      % 10.7   13.6    Eosinophils %      % 1.4   2.2    Basophils %      % 0.6   0.5    Immature Granulocyte %      % 0.6   0.2    Glucose      70 - 99 mg/dL 90   88    Sodium      136 - 145 mmol/L 147 (H)   146 (H)    Potassium      3.5 - 5.1 mmol/L 4.2   3.9    Chloride      98 - 112 mmol/L 114 (H)   113 (H)    Carbon Dioxide, Total      21.0 - 32.0 mmol/L 28.0   30.0    ANION GAP      0 - 18 mmol/L 5   3    BUN      9 - 23 mg/dL 19   24 (H)    CREATININE      0.70 - 1.30 mg/dL 0.50 (L)   0.51 (L)    CALCIUM      8.5 - 10.1 mg/dL 8.4 (L)   8.8    CALCULATED OSMOLALITY      275 - 295 mOsm/kg 306 (H)   305 (H)    EGFR      >=60 mL/min/1.73m2 108   107    AST (SGOT)      15 - 37 U/L 29      ALT (SGPT)      16 - 61 U/L 62 (H)      ALKALINE PHOSPHATASE      45 - 117 U/L 80      Total Bilirubin      0.1 - 2.0 mg/dL 0.2      PROTEIN, TOTAL      6.4 - 8.2 g/dL 5.4 (L)      Albumin      3.4 - 5.0 g/dL 2.6 (L)      Globulin      2.8 - 4.4 g/dL 2.8      A/G Ratio      1.0 - 2.0  0.9 (L)      HEMOGLOBIN A1c      <5.7 % 5.4      ESTIMATED AVERAGE GLUCOSE      68 - 126 mg/dL 108      Iron, Serum      65 - 175 ug/dL 82      Vitamin B12      193 - 986 pg/mL  341     Magnesium, Serum      1.6 - 2.6 mg/dL   2.2    PHOSPHORUS      2.5 - 4.9 mg/dL   4.1       Legend:  (L) Low  (H) High    Assessment & Plan:   1. Pruritus    Don presents with ongoing pruritus which I suspect is functional.  Extensive workup for other structural or metabolic causes has been negative.  Although I doubt a current metabolic cause it is not unreasonable to repeat a CMP to assess liver enzymes and kidney function along with a TSH.  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  He should continue the mirtazapine and active follow-up with psychiatry.  Fortunately his life threatening weight loss has been reversed with psychiatric therapy and treatment of urinary retention.  I have encouraged Jonny  and Pat regarding his remarkable progress since earlier this year.      Orders Placed This Encounter   Procedures    Comp Metabolic Panel (14)    CBC W Differential W Platelet    TSH (Assay, Thyroid Stim Hormone)    PTH, Intact       Meds This Visit:  Requested Prescriptions      No prescriptions requested or ordered in this encounter       Imaging & Referrals:  None

## 2024-07-23 ENCOUNTER — LAB ENCOUNTER (OUTPATIENT)
Dept: LAB | Facility: HOSPITAL | Age: 74
End: 2024-07-23
Attending: INTERNAL MEDICINE
Payer: MEDICARE

## 2024-07-23 DIAGNOSIS — L29.9 PRURITUS: ICD-10-CM

## 2024-07-23 LAB
ALBUMIN SERPL-MCNC: 4.6 G/DL (ref 3.2–4.8)
ALBUMIN/GLOB SERPL: 1.7 {RATIO} (ref 1–2)
ALP LIVER SERPL-CCNC: 62 U/L
ALT SERPL-CCNC: 12 U/L
ANION GAP SERPL CALC-SCNC: 6 MMOL/L (ref 0–18)
BASOPHILS # BLD AUTO: 0.05 X10(3) UL (ref 0–0.2)
BASOPHILS NFR BLD AUTO: 0.9 %
BILIRUB SERPL-MCNC: 0.6 MG/DL (ref 0.2–1.1)
CALCIUM BLD-MCNC: 9.7 MG/DL (ref 8.7–10.4)
CHLORIDE SERPL-SCNC: 105 MMOL/L (ref 98–112)
CO2 SERPL-SCNC: 31 MMOL/L (ref 21–32)
CREAT BLD-MCNC: 0.84 MG/DL
DEPRECATED RDW RBC AUTO: 43.9 FL (ref 35.1–46.3)
EGFRCR SERPLBLD CKD-EPI 2021: 92 ML/MIN/1.73M2 (ref 60–?)
EOSINOPHIL # BLD AUTO: 0.11 X10(3) UL (ref 0–0.7)
EOSINOPHIL NFR BLD AUTO: 1.9 %
ERYTHROCYTE [DISTWIDTH] IN BLOOD BY AUTOMATED COUNT: 12.8 % (ref 11–15)
FASTING STATUS PATIENT QL REPORTED: YES
GLOBULIN PLAS-MCNC: 2.7 G/DL (ref 2–3.5)
GLUCOSE BLD-MCNC: 87 MG/DL (ref 70–99)
HCT VFR BLD AUTO: 47.3 %
HGB BLD-MCNC: 15.6 G/DL
IMM GRANULOCYTES # BLD AUTO: 0.01 X10(3) UL (ref 0–1)
IMM GRANULOCYTES NFR BLD: 0.2 %
LYMPHOCYTES # BLD AUTO: 1.25 X10(3) UL (ref 1–4)
LYMPHOCYTES NFR BLD AUTO: 21.4 %
MCH RBC QN AUTO: 31 PG (ref 26–34)
MCHC RBC AUTO-ENTMCNC: 33 G/DL (ref 31–37)
MCV RBC AUTO: 94 FL
MONOCYTES # BLD AUTO: 0.52 X10(3) UL (ref 0.1–1)
MONOCYTES NFR BLD AUTO: 8.9 %
NEUTROPHILS # BLD AUTO: 3.89 X10 (3) UL (ref 1.5–7.7)
NEUTROPHILS # BLD AUTO: 3.89 X10(3) UL (ref 1.5–7.7)
NEUTROPHILS NFR BLD AUTO: 66.7 %
PLATELET # BLD AUTO: 197 10(3)UL (ref 150–450)
PROT SERPL-MCNC: 7.3 G/DL (ref 5.7–8.2)
PTH-INTACT SERPL-MCNC: 71.1 PG/ML (ref 18.5–88)
RBC # BLD AUTO: 5.03 X10(6)UL
SODIUM SERPL-SCNC: 142 MMOL/L (ref 136–145)
TSI SER-ACNC: 1.94 MIU/ML (ref 0.55–4.78)
WBC # BLD AUTO: 5.8 X10(3) UL (ref 4–11)

## 2024-07-23 PROCEDURE — 84443 ASSAY THYROID STIM HORMONE: CPT

## 2024-07-23 PROCEDURE — 80053 COMPREHEN METABOLIC PANEL: CPT

## 2024-07-23 PROCEDURE — 83970 ASSAY OF PARATHORMONE: CPT

## 2024-07-23 PROCEDURE — 36415 COLL VENOUS BLD VENIPUNCTURE: CPT

## 2024-07-23 PROCEDURE — 85025 COMPLETE CBC W/AUTO DIFF WBC: CPT

## 2024-07-24 ENCOUNTER — TELEPHONE (OUTPATIENT)
Facility: CLINIC | Age: 74
End: 2024-07-24

## 2024-07-24 NOTE — TELEPHONE ENCOUNTER
Citlali requesting to speak with RN regarding lab results.  Citlali states there is a question regarding BUN and BUN/Creat ratio - states \"test not reported due to hemolysis and if lab needs to be repeated, physician must contact client services at [phone].\"  For additional questions please call.  Thank you.

## 2024-07-24 NOTE — TELEPHONE ENCOUNTER
Dr Lobo    The patient completed blood test on 7/23/2024.    Potassium, AST and BUN not resulted, blood hemolyzed.    If you want a re draw, please let me know. I can call reference lab to re order.    Thank you

## 2024-08-12 ENCOUNTER — TELEPHONE (OUTPATIENT)
Facility: CLINIC | Age: 74
End: 2024-08-12

## 2024-08-12 ENCOUNTER — TELEPHONE (OUTPATIENT)
Dept: INTERNAL MEDICINE CLINIC | Facility: CLINIC | Age: 74
End: 2024-08-12

## 2024-08-12 DIAGNOSIS — L29.9 SEVERE PRURITUS: ICD-10-CM

## 2024-08-12 DIAGNOSIS — R79.89 ELEVATED LFTS: Primary | ICD-10-CM

## 2024-08-12 DIAGNOSIS — L29.9 PRURITUS: Primary | ICD-10-CM

## 2024-08-12 DIAGNOSIS — R30.0 DYSURIA: ICD-10-CM

## 2024-08-12 NOTE — TELEPHONE ENCOUNTER
Patient wife is requesting a new order for a BUN/Crea.  They were not able to test on 7/23.  She is requesting a call back with orders are ready.  Please call

## 2024-08-12 NOTE — TELEPHONE ENCOUNTER
Dr Lobo    Please see message below.    Thank you        Result Notes       Yamil Winchester MD  7/24/2024  4:20 PM CDT Back to Top      I spoke to Winsome.  Don continues to be troubled by intense pruritus.  I do not find a cause based on laboratory testing.  Chemistries are normal.  BUN and AST were not reported due to hemolysis, however, with a normal creatinine and ALT it would be unlikely that a BUN and AST determination will be helpful.  CBC was normal.  I have offered a repeat blood draw although as mentioned I doubt it will be helpful.  Winsome has raised the possibility of evaluation at the [facility] which I would fully support.  They will continue to keep me updated.

## 2024-08-12 NOTE — TELEPHONE ENCOUNTER
Called the patient's wife, Winsome (HIPAA authorized), she verified his date of birth and name.    Winsome was informed of blood work order.    She agreed and will have the patient complete at an [facility] lab.

## 2024-08-12 NOTE — TELEPHONE ENCOUNTER
Patients wife called per patient requesting a urine test, says burning when urine's and a diabetes test as well.

## 2024-08-12 NOTE — TELEPHONE ENCOUNTER
Attempted to contact patient Unable to reach. Message left on voicemail to please call and speak with the nurse about urine symptoms.

## 2024-08-12 NOTE — TELEPHONE ENCOUNTER
Spoke with patient wife Winsome. She has a few concerns but does not want to bring patient in. Not able to speak because he is asleep. Per wife patient has been weak when walking. He is not sleeping dur to being itchy. She states that since mid last week patient has been having burning with urination. Wife wants labs to be ordered for urine and diabetes.

## 2024-08-13 ENCOUNTER — LAB ENCOUNTER (OUTPATIENT)
Dept: LAB | Facility: HOSPITAL | Age: 74
End: 2024-08-13
Attending: INTERNAL MEDICINE
Payer: MEDICARE

## 2024-08-13 DIAGNOSIS — L29.9 SEVERE PRURITUS: ICD-10-CM

## 2024-08-13 DIAGNOSIS — R79.89 ELEVATED LFTS: ICD-10-CM

## 2024-08-13 DIAGNOSIS — R30.0 DYSURIA: ICD-10-CM

## 2024-08-13 LAB
ALBUMIN SERPL-MCNC: 4.3 G/DL (ref 3.2–4.8)
ALBUMIN/GLOB SERPL: 1.8 {RATIO} (ref 1–2)
ALP LIVER SERPL-CCNC: 65 U/L
ALT SERPL-CCNC: 17 U/L
ANION GAP SERPL CALC-SCNC: 5 MMOL/L (ref 0–18)
AST SERPL-CCNC: 20 U/L (ref ?–34)
BILIRUB SERPL-MCNC: 0.5 MG/DL (ref 0.2–1.1)
BILIRUB UR QL: NEGATIVE
BUN BLD-MCNC: 16 MG/DL (ref 9–23)
BUN/CREAT SERPL: 19.8 (ref 10–20)
CALCIUM BLD-MCNC: 9.6 MG/DL (ref 8.7–10.4)
CHLORIDE SERPL-SCNC: 107 MMOL/L (ref 98–112)
CLARITY UR: CLEAR
CO2 SERPL-SCNC: 33 MMOL/L (ref 21–32)
COLOR UR: COLORLESS
CREAT BLD-MCNC: 0.81 MG/DL
EGFRCR SERPLBLD CKD-EPI 2021: 93 ML/MIN/1.73M2 (ref 60–?)
EST. AVERAGE GLUCOSE BLD GHB EST-MCNC: 100 MG/DL (ref 68–126)
FASTING STATUS PATIENT QL REPORTED: YES
GLOBULIN PLAS-MCNC: 2.4 G/DL (ref 2–3.5)
GLUCOSE BLD-MCNC: 88 MG/DL (ref 70–99)
GLUCOSE UR-MCNC: NORMAL MG/DL
HBA1C MFR BLD: 5.1 % (ref ?–5.7)
HGB UR QL STRIP.AUTO: NEGATIVE
KETONES UR-MCNC: NEGATIVE MG/DL
LEUKOCYTE ESTERASE UR QL STRIP.AUTO: NEGATIVE
NITRITE UR QL STRIP.AUTO: NEGATIVE
OSMOLALITY SERPL CALC.SUM OF ELEC: 301 MOSM/KG (ref 275–295)
PH UR: 6.5 [PH] (ref 5–8)
POTASSIUM SERPL-SCNC: 4.7 MMOL/L (ref 3.5–5.1)
PROT SERPL-MCNC: 6.7 G/DL (ref 5.7–8.2)
PROT UR-MCNC: NEGATIVE MG/DL
SODIUM SERPL-SCNC: 145 MMOL/L (ref 136–145)
SP GR UR STRIP: 1.01 (ref 1–1.03)
UROBILINOGEN UR STRIP-ACNC: NORMAL

## 2024-08-13 PROCEDURE — 81003 URINALYSIS AUTO W/O SCOPE: CPT

## 2024-08-13 PROCEDURE — 83036 HEMOGLOBIN GLYCOSYLATED A1C: CPT

## 2024-08-13 PROCEDURE — 36415 COLL VENOUS BLD VENIPUNCTURE: CPT

## 2024-08-13 PROCEDURE — 80053 COMPREHEN METABOLIC PANEL: CPT

## 2024-08-15 ENCOUNTER — TELEPHONE (OUTPATIENT)
Dept: INTERNAL MEDICINE CLINIC | Facility: CLINIC | Age: 74
End: 2024-08-15

## 2024-08-15 NOTE — TELEPHONE ENCOUNTER
Patient wife called. She states that she will take patient to [facility]. It has been 3 years and no one can figure out what is wrong with him,

## 2024-08-22 ENCOUNTER — APPOINTMENT (OUTPATIENT)
Dept: URBAN - METROPOLITAN AREA CLINIC 248 | Age: 74
Setting detail: DERMATOLOGY
End: 2024-08-23

## 2024-08-22 DIAGNOSIS — L29.8 OTHER PRURITUS: ICD-10-CM

## 2024-08-22 DIAGNOSIS — D49.2 NEOPLASM OF UNSPECIFIED BEHAVIOR OF BONE, SOFT TISSUE, AND SKIN: ICD-10-CM

## 2024-08-22 DIAGNOSIS — L70.8 OTHER ACNE: ICD-10-CM

## 2024-08-22 DIAGNOSIS — L50.3 DERMATOGRAPHIC URTICARIA: ICD-10-CM

## 2024-08-22 PROCEDURE — OTHER ADDITIONAL NOTES: OTHER

## 2024-08-22 PROCEDURE — OTHER COUNSELING: OTHER

## 2024-08-22 PROCEDURE — OTHER PRESCRIPTION MEDICATION MANAGEMENT: OTHER

## 2024-08-22 PROCEDURE — OTHER OTC TREATMENT REGIMEN: OTHER

## 2024-08-22 PROCEDURE — OTHER BIOPSY BY PUNCH METHOD: OTHER

## 2024-08-22 PROCEDURE — OTHER ORDER TESTS: OTHER

## 2024-08-22 PROCEDURE — OTHER PRESCRIPTION: OTHER

## 2024-08-22 PROCEDURE — 99214 OFFICE O/P EST MOD 30 MIN: CPT | Mod: 25

## 2024-08-22 PROCEDURE — 11104 PUNCH BX SKIN SINGLE LESION: CPT

## 2024-08-22 RX ORDER — KETOCONAZOLE 20 MG/ML
SHAMPOO, SUSPENSION TOPICAL
Qty: 120 | Refills: 0 | Status: ACTIVE

## 2024-08-22 ASSESSMENT — LOCATION SIMPLE DESCRIPTION DERM
LOCATION SIMPLE: UPPER BACK
LOCATION SIMPLE: RIGHT UPPER BACK
LOCATION SIMPLE: RIGHT LOWER BACK
LOCATION SIMPLE: LEFT UPPER BACK

## 2024-08-22 ASSESSMENT — LOCATION DETAILED DESCRIPTION DERM
LOCATION DETAILED: RIGHT SUPERIOR LATERAL MIDBACK
LOCATION DETAILED: RIGHT SUPERIOR UPPER BACK
LOCATION DETAILED: LEFT INFERIOR MEDIAL UPPER BACK
LOCATION DETAILED: INFERIOR THORACIC SPINE

## 2024-08-22 ASSESSMENT — LOCATION ZONE DERM: LOCATION ZONE: TRUNK

## 2024-08-22 NOTE — PROCEDURE: BIOPSY BY PUNCH METHOD

## 2024-08-22 NOTE — PROCEDURE: ADDITIONAL NOTES
Detail Level: Simple
Additional Notes: Briefly discussed to see an allergist for a consult.
Render Risk Assessment In Note?: no

## 2024-08-22 NOTE — PROCEDURE: OTC TREATMENT REGIMEN
Patient Specific Otc Recommendations (Will Not Stick From Patient To Patient): Antihistamine Zyrtec or xyzal
Detail Level: Zone

## 2024-08-22 NOTE — PROCEDURE: ORDER TESTS
Expected Date Of Service: 08/22/2024
Bill For Surgical Tray: no
Billing Type: Third-Party Bill
Performing Laboratory: 0

## 2024-09-03 ENCOUNTER — APPOINTMENT (OUTPATIENT)
Dept: URBAN - METROPOLITAN AREA CLINIC 248 | Age: 74
Setting detail: DERMATOLOGY
End: 2024-09-03

## 2024-09-03 ENCOUNTER — LAB ENCOUNTER (OUTPATIENT)
Dept: LAB | Age: 74
End: 2024-09-03
Payer: MEDICARE

## 2024-09-03 DIAGNOSIS — L70.8 OTHER ACNE: ICD-10-CM

## 2024-09-03 DIAGNOSIS — L29.8 PRURITUS SENILIS: Primary | ICD-10-CM

## 2024-09-03 DIAGNOSIS — L29.8 OTHER PRURITUS: ICD-10-CM

## 2024-09-03 DIAGNOSIS — L50.3 DERMATOGRAPHIC URTICARIA: ICD-10-CM

## 2024-09-03 DIAGNOSIS — Z48.02 ENCOUNTER FOR REMOVAL OF SUTURES: ICD-10-CM

## 2024-09-03 DIAGNOSIS — D49.2 NEOPLASM OF SOFT TISSUE: ICD-10-CM

## 2024-09-03 DIAGNOSIS — D49.2 NEOPLASM OF BONE: ICD-10-CM

## 2024-09-03 DIAGNOSIS — D49.2 NEOPLASM OF SKIN: ICD-10-CM

## 2024-09-03 DIAGNOSIS — D49.2 SKIN TUMOR: ICD-10-CM

## 2024-09-03 LAB
ALBUMIN SERPL-MCNC: 4.7 G/DL (ref 3.2–4.8)
ALP LIVER SERPL-CCNC: 76 U/L
ALT SERPL-CCNC: 9 U/L
ANION GAP SERPL CALC-SCNC: 7 MMOL/L (ref 0–18)
AST SERPL-CCNC: 21 U/L (ref ?–34)
BASOPHILS # BLD AUTO: 0.04 X10(3) UL (ref 0–0.2)
BASOPHILS NFR BLD AUTO: 0.6 %
BILIRUB DIRECT SERPL-MCNC: 0.2 MG/DL (ref ?–0.3)
BILIRUB SERPL-MCNC: 0.6 MG/DL (ref 0.2–1.1)
BUN BLD-MCNC: 19 MG/DL (ref 9–23)
BUN/CREAT SERPL: 20.4 (ref 10–20)
CALCIUM BLD-MCNC: 10.2 MG/DL (ref 8.7–10.4)
CHLORIDE SERPL-SCNC: 108 MMOL/L (ref 98–112)
CO2 SERPL-SCNC: 31 MMOL/L (ref 21–32)
CREAT BLD-MCNC: 0.93 MG/DL
DEPRECATED RDW RBC AUTO: 46.5 FL (ref 35.1–46.3)
EGFRCR SERPLBLD CKD-EPI 2021: 87 ML/MIN/1.73M2 (ref 60–?)
EOSINOPHIL # BLD AUTO: 0.15 X10(3) UL (ref 0–0.7)
EOSINOPHIL NFR BLD AUTO: 2.4 %
ERYTHROCYTE [DISTWIDTH] IN BLOOD BY AUTOMATED COUNT: 13.6 % (ref 11–15)
FASTING STATUS PATIENT QL REPORTED: NO
GLUCOSE BLD-MCNC: 86 MG/DL (ref 70–99)
HCT VFR BLD AUTO: 49.1 %
HGB BLD-MCNC: 16.4 G/DL
IMM GRANULOCYTES # BLD AUTO: 0.01 X10(3) UL (ref 0–1)
IMM GRANULOCYTES NFR BLD: 0.2 %
LYMPHOCYTES # BLD AUTO: 1.31 X10(3) UL (ref 1–4)
LYMPHOCYTES NFR BLD AUTO: 21.3 %
MCH RBC QN AUTO: 31 PG (ref 26–34)
MCHC RBC AUTO-ENTMCNC: 33.4 G/DL (ref 31–37)
MCV RBC AUTO: 92.8 FL
MONOCYTES # BLD AUTO: 0.53 X10(3) UL (ref 0.1–1)
MONOCYTES NFR BLD AUTO: 8.6 %
NEUTROPHILS # BLD AUTO: 4.12 X10 (3) UL (ref 1.5–7.7)
NEUTROPHILS # BLD AUTO: 4.12 X10(3) UL (ref 1.5–7.7)
NEUTROPHILS NFR BLD AUTO: 66.9 %
OSMOLALITY SERPL CALC.SUM OF ELEC: 304 MOSM/KG (ref 275–295)
PLATELET # BLD AUTO: 213 10(3)UL (ref 150–450)
POTASSIUM SERPL-SCNC: 4.4 MMOL/L (ref 3.5–5.1)
PROT SERPL-MCNC: 7.4 G/DL (ref 5.7–8.2)
RBC # BLD AUTO: 5.29 X10(6)UL
SODIUM SERPL-SCNC: 146 MMOL/L (ref 136–145)
WBC # BLD AUTO: 6.2 X10(3) UL (ref 4–11)

## 2024-09-03 PROCEDURE — 80076 HEPATIC FUNCTION PANEL: CPT

## 2024-09-03 PROCEDURE — 83520 IMMUNOASSAY QUANT NOS NONAB: CPT

## 2024-09-03 PROCEDURE — 85025 COMPLETE CBC W/AUTO DIFF WBC: CPT

## 2024-09-03 PROCEDURE — 36415 COLL VENOUS BLD VENIPUNCTURE: CPT

## 2024-09-03 PROCEDURE — 80048 BASIC METABOLIC PNL TOTAL CA: CPT

## 2024-09-03 PROCEDURE — OTHER SUTURE REMOVAL (GLOBAL PERIOD): OTHER

## 2024-09-03 ASSESSMENT — LOCATION ZONE DERM: LOCATION ZONE: TRUNK

## 2024-09-03 ASSESSMENT — LOCATION DETAILED DESCRIPTION DERM: LOCATION DETAILED: RIGHT LATERAL UPPER BACK

## 2024-09-03 ASSESSMENT — LOCATION SIMPLE DESCRIPTION DERM: LOCATION SIMPLE: RIGHT UPPER BACK

## 2024-09-03 NOTE — PROCEDURE: SUTURE REMOVAL (GLOBAL PERIOD)
Body Location Override (Optional - Billing Will Still Be Based On Selected Body Map Location If Applicable): right superior lateral upper back
Detail Level: Detailed
Add 46894 Cpt? (Important Note: In 2017 The Use Of 73426 Is Being Tracked By Cms To Determine Future Global Period Reimbursement For Global Periods): no

## 2024-09-07 LAB — TRYPTASE: 3 UG/L

## 2024-09-10 RX ORDER — KETOCONAZOLE 20 MG/ML
SHAMPOO, SUSPENSION TOPICAL
Qty: 120 | Refills: 0 | Status: ERX

## 2024-09-18 ENCOUNTER — TELEPHONE (OUTPATIENT)
Dept: ALLERGY | Facility: CLINIC | Age: 74
End: 2024-09-18

## 2024-09-18 NOTE — TELEPHONE ENCOUNTER
Patients spouse called to advise patient is not doing very well. Patient saw Dermatologist 2 weeks ago who advised they reach out to Dr. Schmitt.     Patient has hypersensitive , eyes are always watery, having a hard time walking around and trouble thinking.     Patient has moved out of their home due to mold.     Patient is scheduled and on a wait list but they will like to speak with a Nurse or

## 2024-09-21 NOTE — TELEPHONE ENCOUNTER
Pt wife reports he went to see Dermatologist who did a biopsy from Jonny's back.   Cross breen lightly. Turned bright red. Said it was a hypersensitivity to \"other allergens\"   Itching for three years. Getting worse every day.     December 5th appointment for Dr. Schmitt.    Dr. Harris out of [facility] in [place].   Wife will have records sent to our office.     RN scheduled a follow up for Jonny to be seen October 14th at 230 pm. Wife does not think he takes Xyzal/Levocetirizine 5 mg nightly (per last office visit note). RN recommended Luis start the Xyzal which is available over the counter, as well as start moisturizing daily with Vanicream or Cerave moisturizer.   Wife verbalizes her understanding, and is agreeable to plan of care.   RN advised we will call back if Dr. Schmitt has any further recommendations prior to follow up visit.

## 2024-09-21 NOTE — TELEPHONE ENCOUNTER
RN called pt back with Dr. Schmitt's recommendations.  Pt wife on the phone--wife is on verbal release.    Pt wife verbalizes understanding and denies any further questions or concerns.

## 2024-09-21 NOTE — TELEPHONE ENCOUNTER
Call noted.  Thank you for update.  If possible please have patient bring copy of biopsy report with them from skin biopsy  Agree with trial of Xyzal once a day up to 4 times per day if needed  Patient last seen by me in February 2022

## 2024-10-07 ENCOUNTER — HOSPITAL ENCOUNTER (EMERGENCY)
Facility: HOSPITAL | Age: 74
Discharge: HOME | End: 2024-10-07
Attending: EMERGENCY MEDICINE
Payer: MEDICARE

## 2024-10-07 ENCOUNTER — APPOINTMENT (OUTPATIENT)
Dept: GENERAL RADIOLOGY | Facility: HOSPITAL | Age: 74
End: 2024-10-07
Attending: EMERGENCY MEDICINE
Payer: MEDICARE

## 2024-10-07 VITALS
TEMPERATURE: 99 F | RESPIRATION RATE: 18 BRPM | HEIGHT: 68 IN | OXYGEN SATURATION: 98 % | SYSTOLIC BLOOD PRESSURE: 117 MMHG | BODY MASS INDEX: 20.46 KG/M2 | HEART RATE: 81 BPM | WEIGHT: 135 LBS | DIASTOLIC BLOOD PRESSURE: 79 MMHG

## 2024-10-07 DIAGNOSIS — R53.1 WEAKNESS GENERALIZED: ICD-10-CM

## 2024-10-07 DIAGNOSIS — Z77.120 MOLD EXPOSURE: Primary | ICD-10-CM

## 2024-10-07 LAB
ALBUMIN SERPL-MCNC: 4.1 G/DL (ref 3.2–4.8)
ALP LIVER SERPL-CCNC: 75 U/L
ALT SERPL-CCNC: 17 U/L
ANION GAP SERPL CALC-SCNC: 4 MMOL/L (ref 0–18)
AST SERPL-CCNC: 19 U/L (ref ?–34)
BASOPHILS # BLD AUTO: 0.03 X10(3) UL (ref 0–0.2)
BASOPHILS NFR BLD AUTO: 0.6 %
BILIRUB DIRECT SERPL-MCNC: 0.1 MG/DL (ref ?–0.3)
BILIRUB SERPL-MCNC: 0.3 MG/DL (ref 0.2–1.1)
BUN BLD-MCNC: 22 MG/DL (ref 9–23)
BUN/CREAT SERPL: 28.6 (ref 10–20)
CALCIUM BLD-MCNC: 9.2 MG/DL (ref 8.7–10.4)
CHLORIDE SERPL-SCNC: 110 MMOL/L (ref 98–112)
CO2 SERPL-SCNC: 31 MMOL/L (ref 21–32)
CREAT BLD-MCNC: 0.77 MG/DL
DEPRECATED RDW RBC AUTO: 48 FL (ref 35.1–46.3)
EGFRCR SERPLBLD CKD-EPI 2021: 95 ML/MIN/1.73M2 (ref 60–?)
EOSINOPHIL # BLD AUTO: 0.14 X10(3) UL (ref 0–0.7)
EOSINOPHIL NFR BLD AUTO: 2.6 %
ERYTHROCYTE [DISTWIDTH] IN BLOOD BY AUTOMATED COUNT: 14.1 % (ref 11–15)
GLUCOSE BLD-MCNC: 100 MG/DL (ref 70–99)
HCT VFR BLD AUTO: 43.9 %
HGB BLD-MCNC: 14.1 G/DL
IMM GRANULOCYTES # BLD AUTO: 0.01 X10(3) UL (ref 0–1)
IMM GRANULOCYTES NFR BLD: 0.2 %
LYMPHOCYTES # BLD AUTO: 1.52 X10(3) UL (ref 1–4)
LYMPHOCYTES NFR BLD AUTO: 28.6 %
MCH RBC QN AUTO: 29.9 PG (ref 26–34)
MCHC RBC AUTO-ENTMCNC: 32.1 G/DL (ref 31–37)
MCV RBC AUTO: 93 FL
MONOCYTES # BLD AUTO: 0.52 X10(3) UL (ref 0.1–1)
MONOCYTES NFR BLD AUTO: 9.8 %
NEUTROPHILS # BLD AUTO: 3.09 X10 (3) UL (ref 1.5–7.7)
NEUTROPHILS # BLD AUTO: 3.09 X10(3) UL (ref 1.5–7.7)
NEUTROPHILS NFR BLD AUTO: 58.2 %
OSMOLALITY SERPL CALC.SUM OF ELEC: 303 MOSM/KG (ref 275–295)
PLATELET # BLD AUTO: 208 10(3)UL (ref 150–450)
POTASSIUM SERPL-SCNC: 3.9 MMOL/L (ref 3.5–5.1)
PROT SERPL-MCNC: 6.2 G/DL (ref 5.7–8.2)
RBC # BLD AUTO: 4.72 X10(6)UL
SODIUM SERPL-SCNC: 145 MMOL/L (ref 136–145)
WBC # BLD AUTO: 5.3 X10(3) UL (ref 4–11)

## 2024-10-07 PROCEDURE — 71045 X-RAY EXAM CHEST 1 VIEW: CPT | Performed by: EMERGENCY MEDICINE

## 2024-10-07 PROCEDURE — 85025 COMPLETE CBC W/AUTO DIFF WBC: CPT

## 2024-10-07 PROCEDURE — 99284 EMERGENCY DEPT VISIT MOD MDM: CPT

## 2024-10-07 PROCEDURE — 80076 HEPATIC FUNCTION PANEL: CPT | Performed by: EMERGENCY MEDICINE

## 2024-10-07 PROCEDURE — 36415 COLL VENOUS BLD VENIPUNCTURE: CPT

## 2024-10-07 PROCEDURE — 80048 BASIC METABOLIC PNL TOTAL CA: CPT

## 2024-10-07 PROCEDURE — 99283 EMERGENCY DEPT VISIT LOW MDM: CPT

## 2024-10-08 NOTE — ED INITIAL ASSESSMENT (HPI)
Patient arrives to the ER complaining of mold exposure patient states he has an allergy to mold and he has mold in his house that has made him \"super super\" weak.
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
## 2024-10-14 ENCOUNTER — OFFICE VISIT (OUTPATIENT)
Dept: ALLERGY | Facility: CLINIC | Age: 74
End: 2024-10-14
Payer: MEDICARE

## 2024-10-14 ENCOUNTER — LAB ENCOUNTER (OUTPATIENT)
Dept: LAB | Age: 74
End: 2024-10-14
Attending: ALLERGY & IMMUNOLOGY
Payer: MEDICARE

## 2024-10-14 VITALS — DIASTOLIC BLOOD PRESSURE: 69 MMHG | HEART RATE: 71 BPM | SYSTOLIC BLOOD PRESSURE: 116 MMHG

## 2024-10-14 DIAGNOSIS — J30.2 SEASONAL AND PERENNIAL ALLERGIC RHINOCONJUNCTIVITIS: ICD-10-CM

## 2024-10-14 DIAGNOSIS — H10.10 SEASONAL AND PERENNIAL ALLERGIC RHINOCONJUNCTIVITIS: ICD-10-CM

## 2024-10-14 DIAGNOSIS — L29.9 PRURITUS: ICD-10-CM

## 2024-10-14 DIAGNOSIS — J30.89 SEASONAL AND PERENNIAL ALLERGIC RHINOCONJUNCTIVITIS: ICD-10-CM

## 2024-10-14 DIAGNOSIS — Z23 FLU VACCINE NEED: ICD-10-CM

## 2024-10-14 DIAGNOSIS — L29.9 PRURITUS: Primary | ICD-10-CM

## 2024-10-14 DIAGNOSIS — Z23 NEED FOR COVID-19 VACCINE: ICD-10-CM

## 2024-10-14 PROCEDURE — 86003 ALLG SPEC IGE CRUDE XTRC EA: CPT | Performed by: ALLERGY & IMMUNOLOGY

## 2024-10-14 PROCEDURE — 82785 ASSAY OF IGE: CPT | Performed by: ALLERGY & IMMUNOLOGY

## 2024-10-14 PROCEDURE — 36415 COLL VENOUS BLD VENIPUNCTURE: CPT | Performed by: ALLERGY & IMMUNOLOGY

## 2024-10-14 PROCEDURE — 99214 OFFICE O/P EST MOD 30 MIN: CPT | Performed by: ALLERGY & IMMUNOLOGY

## 2024-10-14 PROCEDURE — 86003 ALLG SPEC IGE CRUDE XTRC EA: CPT

## 2024-10-14 RX ORDER — FLUOCINOLONE ACETONIDE 0.11 MG/ML
OIL TOPICAL
Qty: 118 ML | Refills: 0 | Status: SHIPPED | OUTPATIENT
Start: 2024-10-14
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
## 2024-10-14 NOTE — PATIENT INSTRUCTIONS
#1 pruritus.  Generalized in nature.  See above prior evaluation.  Dove is a soap, Cetaphil as a cleanser  Moisturizers include Cetaphil CeraVe, Vanicream  Trial of Derma-Smoothe twice a day.  Trial of Allegra 180 mg once a day up to 4 times per day if needed  Patient has concerns for symptoms being attributed to exposure to mold and mycotoxins.  Reviewed with patient that I did I do not have a way of testing for mycotoxins for volatile organic chemicals.  Typically more done through an .  Check serum IgE to environmental allergens including mold to reevaluate.  Prior CBC CMP TSH tryptase level reviewed.  Unremarkable    #2 flu vaccine recommended in the fall    #3 COVID-vaccine booster recommended.  Please check with local pharmacy as we do not stock this vaccine.       Orders This Visit:  Orders Placed This Encounter   Procedures    Allergy Region 8    MX01 Mold Mix 1

## 2024-10-16 LAB

## 2024-10-17 LAB — MOLD ALLERG MIX1 IGE QL: NEGATIVE

## 2024-10-21 ENCOUNTER — TELEPHONE (OUTPATIENT)
Dept: ALLERGY | Facility: CLINIC | Age: 74
End: 2024-10-21

## 2024-10-21 NOTE — TELEPHONE ENCOUNTER
----- Message from Josiah Schmitt sent at 10/19/2024  7:32 AM CDT -----    Please contact pt with negative mold mix panel

## 2024-10-21 NOTE — TELEPHONE ENCOUNTER
RN called and left voicemail for pt to go over results listed below.  Provided call back number and office hours.

## 2024-10-21 NOTE — TELEPHONE ENCOUNTER
----- Message from Josiah Schmitt sent at 10/16/2024 12:01 PM CDT -----     Please contact patient with negative serum IgE profile to common environmental allergens.  This includes molds.  Total IgE level is normal at 24.7.  Additional mold mix panel still pending.

## 2024-10-22 ENCOUNTER — OFFICE VISIT (OUTPATIENT)
Dept: OTOLARYNGOLOGY | Facility: CLINIC | Age: 74
End: 2024-10-22
Payer: MEDICARE

## 2024-10-22 DIAGNOSIS — H61.23 BILATERAL IMPACTED CERUMEN: Primary | ICD-10-CM

## 2024-10-22 PROCEDURE — 69210 REMOVE IMPACTED EAR WAX UNI: CPT | Performed by: OTOLARYNGOLOGY
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
## 2024-10-25 ENCOUNTER — TELEPHONE (OUTPATIENT)
Dept: INTERNAL MEDICINE CLINIC | Facility: CLINIC | Age: 74
End: 2024-10-25

## 2024-10-25 NOTE — TELEPHONE ENCOUNTER
Patient is having terrible problems with mold and dust in their house. Patient wife wants to get him in [facility] but hospital is requesting a referral. Wife is wondering what Dr. Burroughs needs from them so the referral can be sent to [facility].   Right now he has fatigue, pain all over his body.   Wife is going to call back with information for referral.

## 2024-10-28 NOTE — TELEPHONE ENCOUNTER
Responded to pt's message, acknowledging oit but requesting clarification of need being expresses, Spouse complains pt having issues w/ dust/mold in home, generalized pain.     States he needs a referral to [facility]?  However pt has traditional Medicare coverage (no managed plan) thus he needs no referral to seek care anywhere.

## 2024-11-02 ENCOUNTER — HOSPITAL ENCOUNTER (OUTPATIENT)
Age: 74
Discharge: HOME | End: 2024-11-02
Payer: MEDICARE

## 2024-11-02 VITALS
DIASTOLIC BLOOD PRESSURE: 79 MMHG | OXYGEN SATURATION: 100 % | SYSTOLIC BLOOD PRESSURE: 126 MMHG | HEART RATE: 79 BPM | TEMPERATURE: 99 F | RESPIRATION RATE: 16 BRPM

## 2024-11-02 DIAGNOSIS — Z77.120 MOLD EXPOSURE: Primary | ICD-10-CM

## 2024-11-02 LAB
#MXD IC: 0.5 X10ˆ3/UL (ref 0.1–1)
BUN BLD-MCNC: 19 MG/DL (ref 7–18)
CHLORIDE BLD-SCNC: 106 MMOL/L (ref 98–112)
CO2 BLD-SCNC: 26 MMOL/L (ref 21–32)
CREAT BLD-MCNC: 0.8 MG/DL
EGFRCR SERPLBLD CKD-EPI 2021: 93 ML/MIN/1.73M2 (ref 60–?)
GLUCOSE BLD-MCNC: 73 MG/DL (ref 70–99)
HCT VFR BLD AUTO: 46.5 %
HCT VFR BLD CALC: 47 %
HGB BLD-MCNC: 14.7 G/DL
ISTAT IONIZED CALCIUM FOR CHEM 8: 1.1 MMOL/L (ref 1.12–1.32)
LYMPHOCYTES # BLD AUTO: 1.6 X10ˆ3/UL (ref 1–4)
LYMPHOCYTES NFR BLD AUTO: 28.4 %
MCH RBC QN AUTO: 29.6 PG (ref 26–34)
MCHC RBC AUTO-ENTMCNC: 31.6 G/DL (ref 31–37)
MCV RBC AUTO: 93.8 FL (ref 80–100)
MIXED CELL %: 9.8 %
NEUTROPHILS # BLD AUTO: 3.4 X10ˆ3/UL (ref 1.5–7.7)
NEUTROPHILS NFR BLD AUTO: 61.8 %
PLATELET # BLD AUTO: 220 X10ˆ3/UL (ref 150–450)
POTASSIUM BLD-SCNC: 3.9 MMOL/L (ref 3.6–5.1)
RBC # BLD AUTO: 4.96 X10ˆ6/UL
SODIUM BLD-SCNC: 143 MMOL/L (ref 136–145)
WBC # BLD AUTO: 5.5 X10ˆ3/UL (ref 4–11)

## 2024-11-02 PROCEDURE — 99213 OFFICE O/P EST LOW 20 MIN: CPT

## 2024-11-02 PROCEDURE — 85025 COMPLETE CBC W/AUTO DIFF WBC: CPT | Performed by: NURSE PRACTITIONER

## 2024-11-02 PROCEDURE — 80047 BASIC METABLC PNL IONIZED CA: CPT

## 2024-11-02 PROCEDURE — 36415 COLL VENOUS BLD VENIPUNCTURE: CPT

## 2024-11-02 NOTE — ED INITIAL ASSESSMENT (HPI)
Patient reports mold exposure in his home and he is highly allergic to mold.  Reports symptoms have worsened since mid August.  Reports fatigue, body aches, brain fog, dizziness, vision and hearing changes.  States he has had some blood tests done 3 weeks ago.  Denies difficulty urinating.  Denies light colored stools.
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
## 2024-11-04 NOTE — TELEPHONE ENCOUNTER
Pt contacted, wife Pat answered (on HALEY) confirmed patient's name, and birthday. Wife verbalizes understanding, and denies further questions.

## 2024-11-11 ENCOUNTER — OFFICE VISIT (OUTPATIENT)
Dept: INTERNAL MEDICINE CLINIC | Facility: CLINIC | Age: 74
End: 2024-11-11
Payer: MEDICARE

## 2024-11-11 VITALS
HEIGHT: 68 IN | RESPIRATION RATE: 18 BRPM | BODY MASS INDEX: 22.31 KG/M2 | DIASTOLIC BLOOD PRESSURE: 56 MMHG | OXYGEN SATURATION: 97 % | SYSTOLIC BLOOD PRESSURE: 108 MMHG | WEIGHT: 147.19 LBS | HEART RATE: 91 BPM

## 2024-11-11 DIAGNOSIS — H90.3 SENSORINEURAL HEARING LOSS, BILATERAL: ICD-10-CM

## 2024-11-11 DIAGNOSIS — F99 INSOMNIA DUE TO OTHER MENTAL DISORDER: ICD-10-CM

## 2024-11-11 DIAGNOSIS — F33.2 SEVERE EPISODE OF RECURRENT MAJOR DEPRESSIVE DISORDER, WITHOUT PSYCHOTIC FEATURES (HCC): ICD-10-CM

## 2024-11-11 DIAGNOSIS — L29.9 SEVERE PRURITUS: ICD-10-CM

## 2024-11-11 DIAGNOSIS — E46 PROTEIN-CALORIE MALNUTRITION, UNSPECIFIED SEVERITY (HCC): ICD-10-CM

## 2024-11-11 DIAGNOSIS — F51.05 INSOMNIA DUE TO OTHER MENTAL DISORDER: ICD-10-CM

## 2024-11-11 DIAGNOSIS — R53.1 GENERALIZED WEAKNESS: ICD-10-CM

## 2024-11-11 DIAGNOSIS — Z77.120 MOLD EXPOSURE: Primary | ICD-10-CM

## 2024-11-11 PROCEDURE — 99213 OFFICE O/P EST LOW 20 MIN: CPT | Performed by: INTERNAL MEDICINE

## 2024-11-11 PROCEDURE — G0439 PPPS, SUBSEQ VISIT: HCPCS | Performed by: INTERNAL MEDICINE
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
## 2024-12-17 ENCOUNTER — APPOINTMENT (OUTPATIENT)
Dept: URBAN - METROPOLITAN AREA CLINIC 248 | Age: 74
Setting detail: DERMATOLOGY
End: 2024-12-17

## 2024-12-17 DIAGNOSIS — L20.89 OTHER ATOPIC DERMATITIS: ICD-10-CM

## 2024-12-17 DIAGNOSIS — L50.3 DERMATOGRAPHIC URTICARIA: ICD-10-CM

## 2024-12-17 DIAGNOSIS — L21.8 OTHER SEBORRHEIC DERMATITIS: ICD-10-CM

## 2024-12-17 PROCEDURE — OTHER MIPS QUALITY: OTHER

## 2024-12-17 PROCEDURE — OTHER ADDITIONAL NOTES: OTHER

## 2024-12-17 PROCEDURE — OTHER PRESCRIPTION: OTHER

## 2024-12-17 PROCEDURE — OTHER COUNSELING: OTHER

## 2024-12-17 PROCEDURE — 99214 OFFICE O/P EST MOD 30 MIN: CPT

## 2024-12-17 PROCEDURE — OTHER PRESCRIPTION MEDICATION MANAGEMENT: OTHER

## 2024-12-17 RX ORDER — KETOCONAZOLE 20 MG/ML
SHAMPOO, SUSPENSION TOPICAL
Qty: 120 | Refills: 3 | Status: ERX

## 2024-12-17 ASSESSMENT — LOCATION ZONE DERM
LOCATION ZONE: TRUNK
LOCATION ZONE: FACE
LOCATION ZONE: SCALP
LOCATION ZONE: ARM

## 2024-12-17 ASSESSMENT — LOCATION DETAILED DESCRIPTION DERM
LOCATION DETAILED: LEFT ANTERIOR DISTAL UPPER ARM
LOCATION DETAILED: LEFT SUPERIOR PARIETAL SCALP
LOCATION DETAILED: RIGHT SUPERIOR UPPER BACK
LOCATION DETAILED: LEFT MID-UPPER BACK
LOCATION DETAILED: RIGHT ANTECUBITAL SKIN
LOCATION DETAILED: INFERIOR MID FOREHEAD

## 2024-12-17 ASSESSMENT — LOCATION SIMPLE DESCRIPTION DERM
LOCATION SIMPLE: INFERIOR FOREHEAD
LOCATION SIMPLE: SCALP
LOCATION SIMPLE: RIGHT ELBOW
LOCATION SIMPLE: LEFT UPPER ARM
LOCATION SIMPLE: LEFT UPPER BACK
LOCATION SIMPLE: RIGHT UPPER BACK

## 2024-12-17 NOTE — PROCEDURE: ADDITIONAL NOTES
Detail Level: Simple
Additional Notes: Saw allergy and working on source of hypersensitivity.  Has mold in home and working on removing it.
Render Risk Assessment In Note?: no
Additional Notes: His main problem is accumulation of oil on the scalp, forehead, and neck.  I discussed that oil production varies person to person and the main way to treat is mechanical removal.  He showers QOD and does not wash face in between.  Has concern about chemicals/washes getting into eyes.\\n\\nRecommend Ketoconazole 2% shampoo QOD.  Can follow with normal shampoo.  Recommend washing face BID - use tear free baby shampoo given eye concerns.

## 2024-12-17 NOTE — PROCEDURE: PRESCRIPTION MEDICATION MANAGEMENT
Detail Level: Zone
Render In Strict Bullet Format?: No
Plan: Pt was advised to use [employer]  or [employer] baby wash on face no moisturizer, pat dry then repeat.
Continue Regimen: ketoconazole 2 % shampoo \\nQuantity: 120.0 ml  Days Supply: 30\\nSig: Apply to scalp leave on for 5 min than rinse. 3x a week

## 2025-01-05 ENCOUNTER — WALK IN (OUTPATIENT)
Dept: URGENT CARE | Age: 75
End: 2025-01-05
Attending: INTERNAL MEDICINE

## 2025-01-05 VITALS
SYSTOLIC BLOOD PRESSURE: 121 MMHG | RESPIRATION RATE: 15 BRPM | HEIGHT: 69 IN | TEMPERATURE: 98.4 F | OXYGEN SATURATION: 97 % | HEART RATE: 81 BPM | WEIGHT: 140 LBS | DIASTOLIC BLOOD PRESSURE: 80 MMHG | BODY MASS INDEX: 20.73 KG/M2

## 2025-01-05 DIAGNOSIS — R53.81 MALAISE AND FATIGUE: ICD-10-CM

## 2025-01-05 DIAGNOSIS — Z77.120 MOLD EXPOSURE: ICD-10-CM

## 2025-01-05 DIAGNOSIS — R53.83 MALAISE AND FATIGUE: ICD-10-CM

## 2025-01-05 DIAGNOSIS — J02.9 SORE THROAT: Primary | ICD-10-CM

## 2025-01-05 LAB
INTERNAL PROCEDURAL CONTROLS ACCEPTABLE: YES
S PYO AG THROAT QL IA.RAPID: NEGATIVE
TEST LOT EXPIRATION DATE: NORMAL
TEST LOT NUMBER: NORMAL

## 2025-01-05 PROCEDURE — 87880 STREP A ASSAY W/OPTIC: CPT | Performed by: INTERNAL MEDICINE

## 2025-01-05 RX ORDER — LORAZEPAM 0.5 MG/1
0.5 TABLET ORAL EVERY 6 HOURS PRN
COMMUNITY

## 2025-01-05 RX ORDER — MIRTAZAPINE 30 MG/1
30 TABLET, FILM COATED ORAL NIGHTLY
COMMUNITY

## 2025-01-05 ASSESSMENT — PAIN SCALES - GENERAL
PAINLEVEL: 7
PAINLEVEL: 7

## 2025-01-05 ASSESSMENT — ENCOUNTER SYMPTOMS
FEVER: 0
FATIGUE: 1
EYES NEGATIVE: 1
ENDOCRINE NEGATIVE: 1
CHILLS: 0
GASTROINTESTINAL NEGATIVE: 1
NEUROLOGICAL NEGATIVE: 1
UNEXPECTED WEIGHT CHANGE: 0
ACTIVITY CHANGE: 0
APPETITE CHANGE: 0
HEMATOLOGIC/LYMPHATIC NEGATIVE: 1
RESPIRATORY NEGATIVE: 1
DIAPHORESIS: 0
SORE THROAT: 1

## 2025-01-07 LAB — S PYO SPEC QL CULT: NORMAL

## 2025-01-08 ENCOUNTER — PATIENT MESSAGE (OUTPATIENT)
Dept: INTERNAL MEDICINE CLINIC | Facility: CLINIC | Age: 75
End: 2025-01-08

## 2025-01-08 DIAGNOSIS — Z12.5 PROSTATE CANCER SCREENING: ICD-10-CM

## 2025-01-08 DIAGNOSIS — R53.1 GENERALIZED WEAKNESS: Primary | ICD-10-CM

## 2025-01-08 DIAGNOSIS — E78.2 MIXED HYPERLIPIDEMIA: ICD-10-CM

## 2025-01-08 DIAGNOSIS — R30.0 DYSURIA: ICD-10-CM

## 2025-01-08 DIAGNOSIS — F32.2 CURRENT SEVERE EPISODE OF MAJOR DEPRESSIVE DISORDER WITHOUT PSYCHOTIC FEATURES WITHOUT PRIOR EPISODE (HCC): ICD-10-CM

## 2025-01-08 NOTE — TELEPHONE ENCOUNTER
Pt messaging requesting Lipids, PSA and A1C blood test orders.    Last Lipids 3/26/24: Chol 278/  Tri 62/  HDL 78/     Last PSA WNL: 3/12/24. 1.64    Lssr A1C WNL: 8/13/24  5.1%    Message re: orders request routed to Dr Burroughs. Await response.

## 2025-01-16 ENCOUNTER — TELEPHONE (OUTPATIENT)
Dept: INTERNAL MEDICINE CLINIC | Facility: CLINIC | Age: 75
End: 2025-01-16

## 2025-01-16 ENCOUNTER — PATIENT MESSAGE (OUTPATIENT)
Dept: FAMILY MEDICINE CLINIC | Facility: CLINIC | Age: 75
End: 2025-01-16

## 2025-01-16 NOTE — TELEPHONE ENCOUNTER
Spouse of pt is calling requesting if for labs could also add order to check kidneys. Spouse states that pt has been having pain and would like to get them checked.      Please call and advise

## 2025-01-16 NOTE — TELEPHONE ENCOUNTER
Spoke to spouse, aware of kidneys also being checked with blood work.   Per patient wife they contacted medicare and they said labs would be covered as long as this was a necessity.

## 2025-01-17 ENCOUNTER — LAB ENCOUNTER (OUTPATIENT)
Dept: LAB | Facility: HOSPITAL | Age: 75
End: 2025-01-17
Attending: INTERNAL MEDICINE
Payer: MEDICARE

## 2025-01-17 DIAGNOSIS — R30.0 DYSURIA: ICD-10-CM

## 2025-01-17 DIAGNOSIS — E78.2 MIXED HYPERLIPIDEMIA: ICD-10-CM

## 2025-01-17 DIAGNOSIS — F32.2 CURRENT SEVERE EPISODE OF MAJOR DEPRESSIVE DISORDER WITHOUT PSYCHOTIC FEATURES WITHOUT PRIOR EPISODE (HCC): ICD-10-CM

## 2025-01-17 LAB
ANION GAP SERPL CALC-SCNC: 10 MMOL/L (ref 0–18)
BASOPHILS # BLD AUTO: 0.04 X10(3) UL (ref 0–0.2)
BASOPHILS NFR BLD AUTO: 0.7 %
BILIRUB UR QL: NEGATIVE
BUN BLD-MCNC: 10 MG/DL (ref 9–23)
BUN/CREAT SERPL: 11.6 (ref 10–20)
CALCIUM BLD-MCNC: 9.5 MG/DL (ref 8.7–10.4)
CHLORIDE SERPL-SCNC: 106 MMOL/L (ref 98–112)
CHOLEST SERPL-MCNC: 228 MG/DL (ref ?–200)
CLARITY UR: CLEAR
CO2 SERPL-SCNC: 29 MMOL/L (ref 21–32)
COLOR UR: YELLOW
CREAT BLD-MCNC: 0.86 MG/DL
DEPRECATED RDW RBC AUTO: 44.8 FL (ref 35.1–46.3)
EGFRCR SERPLBLD CKD-EPI 2021: 91 ML/MIN/1.73M2 (ref 60–?)
EOSINOPHIL # BLD AUTO: 0.14 X10(3) UL (ref 0–0.7)
EOSINOPHIL NFR BLD AUTO: 2.3 %
ERYTHROCYTE [DISTWIDTH] IN BLOOD BY AUTOMATED COUNT: 13.2 % (ref 11–15)
FASTING PATIENT LIPID ANSWER: YES
FASTING STATUS PATIENT QL REPORTED: YES
GLUCOSE BLD-MCNC: 83 MG/DL (ref 70–99)
GLUCOSE UR-MCNC: NORMAL MG/DL
HCT VFR BLD AUTO: 45.7 %
HDLC SERPL-MCNC: 55 MG/DL (ref 40–59)
HGB BLD-MCNC: 15 G/DL
HGB UR QL STRIP.AUTO: NEGATIVE
IMM GRANULOCYTES # BLD AUTO: 0.02 X10(3) UL (ref 0–1)
IMM GRANULOCYTES NFR BLD: 0.3 %
KETONES UR-MCNC: NEGATIVE MG/DL
LDLC SERPL CALC-MCNC: 161 MG/DL (ref ?–100)
LEUKOCYTE ESTERASE UR QL STRIP.AUTO: NEGATIVE
LYMPHOCYTES # BLD AUTO: 1.71 X10(3) UL (ref 1–4)
LYMPHOCYTES NFR BLD AUTO: 28.1 %
MCH RBC QN AUTO: 30.1 PG (ref 26–34)
MCHC RBC AUTO-ENTMCNC: 32.8 G/DL (ref 31–37)
MCV RBC AUTO: 91.6 FL
MONOCYTES # BLD AUTO: 0.48 X10(3) UL (ref 0.1–1)
MONOCYTES NFR BLD AUTO: 7.9 %
NEUTROPHILS # BLD AUTO: 3.69 X10 (3) UL (ref 1.5–7.7)
NEUTROPHILS # BLD AUTO: 3.69 X10(3) UL (ref 1.5–7.7)
NEUTROPHILS NFR BLD AUTO: 60.7 %
NITRITE UR QL STRIP.AUTO: NEGATIVE
NONHDLC SERPL-MCNC: 173 MG/DL (ref ?–130)
OSMOLALITY SERPL CALC.SUM OF ELEC: 298 MOSM/KG (ref 275–295)
PH UR: 5.5 [PH] (ref 5–8)
PLATELET # BLD AUTO: 203 10(3)UL (ref 150–450)
POTASSIUM SERPL-SCNC: 3.8 MMOL/L (ref 3.5–5.1)
RBC # BLD AUTO: 4.99 X10(6)UL
SODIUM SERPL-SCNC: 145 MMOL/L (ref 136–145)
SP GR UR STRIP: 1.02 (ref 1–1.03)
TRIGL SERPL-MCNC: 67 MG/DL (ref 30–149)
UROBILINOGEN UR STRIP-ACNC: NORMAL
VLDLC SERPL CALC-MCNC: 13 MG/DL (ref 0–30)
WBC # BLD AUTO: 6.1 X10(3) UL (ref 4–11)

## 2025-01-17 PROCEDURE — 36415 COLL VENOUS BLD VENIPUNCTURE: CPT

## 2025-01-17 PROCEDURE — 80048 BASIC METABOLIC PNL TOTAL CA: CPT

## 2025-01-17 PROCEDURE — 80061 LIPID PANEL: CPT

## 2025-01-17 PROCEDURE — 81003 URINALYSIS AUTO W/O SCOPE: CPT

## 2025-01-17 PROCEDURE — 85025 COMPLETE CBC W/AUTO DIFF WBC: CPT
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
## 2025-04-22 NOTE — ED QUICK NOTES
Rounding Completed    Plan of Care reviewed. Waiting for imaging.  Elimination needs assessed.  Provided warm blanket.    Bed is locked and in lowest position. Call light within reach.
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
## 2025-04-23 NOTE — TELEPHONE ENCOUNTER
Message routed to Dr Burroughs re: possible pain Rx given pt states he is unable to travel to office for exam w/ PCP. Seen in ED yesterday for knee pain and abd discomfort and discharged w/o pain Rx.

## 2025-04-23 NOTE — TELEPHONE ENCOUNTER
Spoke with patient. He states that he is in a lot of pain due to moving stuff during his move. He was in the ED yesterday but they did not treat his pain . He can not come in for a follow up because he can hardly move. He will not go to ED. He just want something for pain.

## 2025-04-23 NOTE — TELEPHONE ENCOUNTER
Pt is calling requesting to talk to Dr. Burroughs. Pt did not want to give information of what it was in regards to.       Please call and advise

## 2025-04-24 NOTE — TELEPHONE ENCOUNTER
Spoke with patient's wife. She states that patient is asleep so we could not speak to him. Did inform her that Dr. Burroughs wants to see patient. She states that his not much help. He can not move due to being in severe pain and not able to move. Patient's wife advised to go to the ED if patient is having that much pain. She states that the ED did nothing for patient. Advised that she take patient to a different ED or to call 911. She states that will not help. Offered future appointment but wife said good bye and hung up phone.

## 2025-05-06 ENCOUNTER — RX ONLY (RX ONLY)
Age: 75
End: 2025-05-06

## 2025-05-06 ENCOUNTER — APPOINTMENT (OUTPATIENT)
Dept: URBAN - METROPOLITAN AREA CLINIC 248 | Age: 75
Setting detail: DERMATOLOGY
End: 2025-05-08

## 2025-05-06 DIAGNOSIS — L21.8 OTHER SEBORRHEIC DERMATITIS: ICD-10-CM

## 2025-05-06 DIAGNOSIS — L20.89 OTHER ATOPIC DERMATITIS: ICD-10-CM

## 2025-05-06 PROCEDURE — OTHER ADDITIONAL NOTES: OTHER

## 2025-05-06 PROCEDURE — OTHER PRESCRIPTION MEDICATION MANAGEMENT: OTHER

## 2025-05-06 PROCEDURE — OTHER COUNSELING: OTHER

## 2025-05-06 PROCEDURE — 99214 OFFICE O/P EST MOD 30 MIN: CPT

## 2025-05-06 PROCEDURE — OTHER MEDICATION COUNSELING: OTHER

## 2025-05-06 PROCEDURE — OTHER MIPS QUALITY: OTHER

## 2025-05-06 RX ORDER — TRIAMCINOLONE ACETONIDE 1 MG/G
CREAM TOPICAL BID
Qty: 80 | Refills: 4 | Status: ERX

## 2025-05-06 RX ORDER — CLOBETASOL PROPIONATE 0.5 MG/ML
SOLUTION TOPICAL
Qty: 50 | Refills: 4 | Status: ERX

## 2025-05-06 RX ORDER — KETOCONAZOLE 20 MG/ML
SHAMPOO, SUSPENSION TOPICAL
Qty: 120 | Refills: 6 | Status: ERX

## 2025-05-06 ASSESSMENT — LOCATION ZONE DERM
LOCATION ZONE: ARM
LOCATION ZONE: SCALP
LOCATION ZONE: FACE
LOCATION ZONE: TRUNK

## 2025-05-06 ASSESSMENT — LOCATION SIMPLE DESCRIPTION DERM
LOCATION SIMPLE: LEFT UPPER ARM
LOCATION SIMPLE: INFERIOR FOREHEAD
LOCATION SIMPLE: SCALP
LOCATION SIMPLE: LEFT UPPER BACK
LOCATION SIMPLE: RIGHT ELBOW

## 2025-05-06 ASSESSMENT — LOCATION DETAILED DESCRIPTION DERM
LOCATION DETAILED: LEFT ANTERIOR DISTAL UPPER ARM
LOCATION DETAILED: RIGHT ANTECUBITAL SKIN
LOCATION DETAILED: INFERIOR MID FOREHEAD
LOCATION DETAILED: LEFT SUPERIOR PARIETAL SCALP
LOCATION DETAILED: LEFT MID-UPPER BACK
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
## 2025-05-07 NOTE — PATIENT INSTRUCTIONS
Continue to follow-up with your primary care doctor.  See orthopedics for your right knee and you should see GI if constipation continues to be a major problem.

## 2025-05-19 RX ORDER — KETOCONAZOLE 20 MG/ML
SHAMPOO, SUSPENSION TOPICAL
Qty: 120 | Refills: 11 | Status: ERX

## 2025-07-26 NOTE — ED QUICK NOTES
PT safe to DC home per MD. Shannan Pond to dress self. DC teaching done, pt verbalizes understanding. Wheeled to exit.
There are no Wet Read(s) to document.

## (undated) DIAGNOSIS — E83.52 HYPERCALCEMIA: Primary | ICD-10-CM

## (undated) DIAGNOSIS — E21.3 HYPERPARATHYROIDISM (HCC): Primary | ICD-10-CM

## (undated) DEVICE — KIT ENDO ORCAPOD 160/180/190

## (undated) DEVICE — KIT CLEAN ENDOKIT 1.1OZ GOWNX2

## (undated) DEVICE — 35 ML SYRINGE REGULAR TIP: Brand: MONOJECT

## (undated) DEVICE — CONMED SCOPE SAVER BITE BLOCK, 20X27 MM: Brand: SCOPE SAVER

## (undated) DEVICE — MEDI-VAC NON-CONDUCTIVE SUCTION TUBING 6MM X 1.8M (6FT.) L: Brand: CARDINAL HEALTH

## (undated) DEVICE — LINE MNTR ADLT SET O2 INTMD

## (undated) DEVICE — FORCEP RADIAL JAW 4

## (undated) NOTE — ED AVS SNAPSHOT
Carlos Solis   MRN: [id number]    Department:  [facility] Emergency Department   Date of Visit:  11/28/2018           Disclosure     Insurance plans vary and the physician(s) referred by the ER may not be covered by your plan.  Please conta within the next three months to obtain basic health screening including reassessment of your blood pressure.     IF THERE IS ANY CHANGE OR WORSENING OF YOUR CONDITION, CALL YOUR PRIMARY CARE PHYSICIAN AT ONCE OR RETURN IMMEDIATELY TO THE EMERGENCY DEPARTMEN

## (undated) NOTE — LETTER
[address], [place]  Authorization for Invasive Procedures  1. I hereby authorize Dr. Rajiv Don , my physician and whomever may be designated as the doctor's assistant, to perform the following operation and/or procedure:  Esophagogastroduodenoscopy on Chase Nguyen at [facility].    2. My physician has explained to me the nature and purpose of the operation or other procedure, possible alternative methods of treatment, the risks involved and the possibility of complications to me. I understand the probable consequences of declining the recommended procedure and the alternative methods of treatment. I acknowledge that no guarantee has been made as to the result that may be obtained. 3. I recognize that during the course of this operation or other procedure, unforeseen conditions may necessitate additional or different procedures than those listed above. I, therefore, further authorize and request that the above-named physician, his/her physician assistants, or designees perform such procedures as are, in his/her professional opinion, necessary and desirable. If I have a Do Not Attempt Resuscitation (DNAR) order in place, that status will be suspended while in the operating room, procedural suite, and during the recovery period unless otherwise explicitly stated by me (or a person authorized to consent on my behalf). The surgeon or my attending physician will determine when the applicable recovery period ends for purposes of reinstating the DNAR order. 4. Should the need arise during my operation or immediate post-operative period; I also consent to the administration of blood and/or blood products.  Further, I understand that despite careful testing and screening of blood and blood products, I may still be subject to ill effects as a result of recieving a blood transfusion an/or blood producst. The following are some, but not all, of the potential risks that can occur: fever and allergic reactions, hemolytic reactions, transmission of disease such as hepatitis, AIDS, cytomegalovirus (CMV), and flluid overload. In the event that I wish to have autologous transfusions of my own blood, or a directed donor transfusion, I will discuss this with my physician. 5. I consent to the photographing of the operations or procedures to be performed for the purposes of advancing medicine, science, and/or education, provided my identity is not revealed. If the procedure has been videotaped, the physician/surgeon will obtain the original videotape. The hospital will not be responsible for storage or maintenance of this tape. 6. I consent to the presence of a  or observer as deemed necessary by my physician or his designee. 7. Any tissues or organs removed in the operation or other procedure may be disposed of by and at the discretion of [facility].    8. I understand that the physician and his/her physician assistants may not be employees or agents of [facility], [facility], nor [facility], but are independent medical practitioners who have been permitted to use its facilities for the care and treatment of their patients. 9. Patients having a sterilization procedure: I understand that if the procedure is successful the results will be permanent and it will therefore be impossible for me to inseminate, conceive or bear children. I also understand that the procedure is intended to result in sterility, although the result has not been guaranteed. 10. I CERTIFY THAT I HAVE READ AND FULLY UNDERSTAND THE ABOVE CONSENT TO OPERATION and/or OTHER PROCEDURE. 11. I acknowledge that my physician has explained sedation/analgesia administration to me including the risks and benefits. I consent to the administration of sedation/analgesia as may be necessary or desirable in the judgment of my physician. Signature of Patient:  ________________________________________________ Date: _________Time: _________    Responsible person in case of minor or unconscious: _____________________________Relationship: ____________     Witness Signature: ____________________________________________ Date: __________ Time: ___________    Statement of Physician  My signature below affirms that prior to the time of the procedure, I have explained to the patient and/or his legal representative, the risks and benefits involved in the proposed treatment and any reasonable alternative to the proposed treatment. I have also explained the risks and benefits involved in the refusal of the proposed treatment and have answered the patient's questions. If I have a significant financial interest in this procedure/surgery, I have disclosed this and had a discussion with my patient.     Signature of Physician:   ________________________________________Date: _________Time:_______ Patient Name: Scott Bull  : 3/5/1950   Printed: 2022    Medical Record #: [id number]
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
## (undated) NOTE — Clinical Note
4/7/2017              [address] [facility] [address]         Dear Niraj Job,    This letter is to inform you that your colonoscopy and endoscopy that was scheduled on 4/11/17 at 8:30am with Dr. Claudell Public has been ca

## (undated) NOTE — LETTER
12/9/2021              [address] [facility] [address]         To whom it may concern    It is okay for Mr. Garrett Calderón to get antibiotics prior to dental extraction.   If you have any questions, please contact my of

## (undated) NOTE — MR AVS SNAPSHOT
[address]  [phone]               Thank you for choosing us for your health care visit with Angel Griffith MD.  We are glad to serve you and happy to provide you with this summar or be assured that none are required. You can then schedule your appointment. Failure to obtain required authorization numbers can create reimbursement difficulties for you.         Referral for Evaluation  Comprehensive Hearing Evaluation and Tympanometry · Don’t use cold water to rinse the ear. This will make you dizzy. · Don’t do this procedure if you have an ear infection. Symptoms include ear pain, fever, or fluid draining from the ear. · Don’t do this procedure if you have a punctured eardrum.   · Niharika Has Take as directed. Generic or substitute okay   Commonly known as:  [place] can access your MyChart to more actively manage your health care and view more details from this visit by going to [email or web address]

## (undated) NOTE — Clinical Note
No referring provider defined for this encounter. 03/07/2017        Patient: Prasad Stone   YOB: 1950   Date of Visit: 3/7/2017       Dear  Dr. Maine Bain MD,      Thank you for referring Prasad Stone to my practice.   Ple

## (undated) NOTE — LETTER
October 18, 2021     [address]      Dear Chris Levels:    Below are the results from your recent visit:    Resulted Orders   OCCULT BLOOD, FECAL, IMMUNOASSAY   Result Value Ref Range    Occult Blood Negative Ne
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
## (undated) NOTE — ED AVS SNAPSHOT
Norman Samaniegoshirley   MRN: [id number]    Department:  [facility] Emergency Department   Date of Visit:  9/9/2017           Disclosure     Insurance plans vary and the physician(s) referred by the ER may not be covered by your plan.  Please contact CARE PHYSICIAN AT ONCE OR RETURN IMMEDIATELY TO THE EMERGENCY DEPARTMENT. If you have been prescribed any medication(s), please fill your prescription right away and begin taking the medication(s) as directed.   If you believe that any of the medications

## (undated) NOTE — MR AVS SNAPSHOT
[place]  [address]  [phone]               Thank you for choosing us for your health care visit with Pauline Canas.   We are glad to serve you and happy to provide you with this summary the way into the ear to the brain.       Setting realistic expectations  Advances in technology have made today’s hearing aids better than ever. But your hearing still won’t be perfect. You may not hear all sounds.  And you won’t hear only the things you wa Call [phone] for help. MyChart is NOT to be used for urgent needs. For medical emergencies, dial 911. Educational Information     Your blood pressure indicates you may be at-risk for Hypertension.    Please consider the following Lifestyle M
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
## (undated) NOTE — IP AVS SNAPSHOT
[facility]            (For Outpatient Use Only) Initial Admit Date: 12/18/2021   Inpt/Obs Admit Date: Inpt: N/A / Obs: 12/18/21   Discharge Date:    Beckie Barron:  [de-identified]   MRN: [de-identified]   CSN: [id number]   CEID: [id number]        E Plan:    Group Number:  Insurance Type:    Subscriber Name:  Subscriber :    Subscriber ID:  Pt Rel to Subscriber:    Hospital Account Financial Class: Medicare    2021

## (undated) NOTE — MR AVS SNAPSHOT
[address] [address]  [phone]               Thank you for choosing us for your health care visit with Travis Ballard MD.  We are glad to serve you and happy to provide you with this s office, you can view your past visit information in [employer] by going to Visits < Visit Summaries. [employer] questions? Call [phone] for help. [employer] is NOT to be used for urgent needs. For medical emergencies, dial 911.         Educational Inform

## (undated) NOTE — MR AVS SNAPSHOT
[facility]  [address] [place] [address]  [phone]               Thank you for choosing us for your health care visit with Valentina Kelly MD.  We are glad to serve you and happy to provide you with this summary of your vis your doctor or other care provider to review them with you. MyChart     Visit [employer]  You can access your MyChart to more actively manage your health care and view more details from this visit by going to [email or web address].   If you'v